# Patient Record
Sex: FEMALE | Race: WHITE | Employment: UNEMPLOYED | URBAN - METROPOLITAN AREA
[De-identification: names, ages, dates, MRNs, and addresses within clinical notes are randomized per-mention and may not be internally consistent; named-entity substitution may affect disease eponyms.]

---
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
## 2020-01-06 ENCOUNTER — OFFICE VISIT (OUTPATIENT)
Dept: FAMILY MEDICINE CLINIC | Age: 20
End: 2020-01-06
Payer: COMMERCIAL

## 2020-01-06 VITALS
TEMPERATURE: 99.4 F | OXYGEN SATURATION: 95 % | WEIGHT: 293 LBS | HEART RATE: 128 BPM | DIASTOLIC BLOOD PRESSURE: 84 MMHG | BODY MASS INDEX: 45.48 KG/M2 | SYSTOLIC BLOOD PRESSURE: 146 MMHG

## 2020-01-06 LAB
INFLUENZA A ANTIBODY: NORMAL
INFLUENZA B ANTIBODY: NORMAL

## 2020-01-06 PROCEDURE — 87804 INFLUENZA ASSAY W/OPTIC: CPT | Performed by: FAMILY MEDICINE

## 2020-01-06 PROCEDURE — 99213 OFFICE O/P EST LOW 20 MIN: CPT | Performed by: FAMILY MEDICINE

## 2020-01-06 RX ORDER — AZITHROMYCIN 250 MG/1
TABLET, FILM COATED ORAL
Qty: 1 PACKET | Refills: 0 | Status: SHIPPED | OUTPATIENT
Start: 2020-01-06 | End: 2020-01-16

## 2020-01-06 ASSESSMENT — ENCOUNTER SYMPTOMS
SHORTNESS OF BREATH: 0
RHINORRHEA: 1
DIARRHEA: 1
SORE THROAT: 1
ABDOMINAL PAIN: 0
VOMITING: 0
COUGH: 1
SINUS PAIN: 1
NAUSEA: 1

## 2020-01-06 NOTE — PROGRESS NOTES
[facility] FAMILY MEDICINE   [address] [place] [address] [address] [facility] [address]  Dept: [phone]  Dept Fax: [phone]    Santo Wise is a 23 y.o. female who presents today for her medical conditions/complaintsas noted below. Santo Wise is c/o of Congestion (head congestion, body aches, sore throat x 1 day )        HPI:     URI    This is a new problem. The current episode started yesterday. The problem has been unchanged. The maximum temperature recorded prior to her arrival was 101 - 101.9 F. Associated symptoms include congestion, coughing (off/on), diarrhea ([id number]), ear pain, headaches, nausea, rhinorrhea, sinus pain and a sore throat. Pertinent negatives include no abdominal pain, rash or vomiting. Associated symptoms comments: myalgia. She has tried acetaminophen (migraine excedrin, dayquil, cough drops) for the symptoms. The treatment provided no relief.    Nothing makes symptoms worse  Works in , mom also sick recently  Patient reports that she has had sinus infections in the past.  Augmentin has not seemed to help but Z-Mark has helped in the past.    Past Medical History:   Diagnosis Date    Left leg DVT ([address]     Pre-diabetes     Thyroid disease       Past Surgical History:   Procedure Laterality Date    TONSILLECTOMY AND ADENOIDECTOMY  06/2016       Family History   Problem Relation Age of Onset    Diabetes Father     Sleep Apnea Father        Social History     Tobacco Use    Smoking status: Never Smoker    Smokeless tobacco: Never Used   Substance Use Topics    Alcohol use: No      Current Outpatient Medications   Medication Sig Dispense Refill    azithromycin (ZITHROMAX) 250 MG tablet Use as directed 1 packet 0    ibuprofen (ADVIL;MOTRIN) 800 MG tablet Take 1 tablet by mouth every 6 hours as needed for Pain 28 tablet 0    cetirizine-psuedoephedrine (ZYRTEC-D ALLERGY & CONGESTION) 5-120 MG per extended release tablet Take 1 Mouth/Throat:      Lips: Pink. Mouth: Mucous membranes are moist.      Pharynx: Posterior oropharyngeal erythema present. No pharyngeal swelling or oropharyngeal exudate. Tonsils: No tonsillar exudate. Swellin on the right. Eyes:      Extraocular Movements: Extraocular movements intact. Conjunctiva/sclera: Conjunctivae normal.   Neck:      Musculoskeletal: Muscular tenderness (With palpation of anterior neck) present. Cardiovascular:      Rate and Rhythm: Normal rate and regular rhythm. Heart sounds: Normal heart sounds. Pulmonary:      Effort: Pulmonary effort is normal.      Breath sounds: Normal breath sounds. Lymphadenopathy:      Cervical: No cervical adenopathy. Skin:     General: Skin is warm and dry. Neurological:      Mental Status: She is alert and oriented to person, place, and time. Mental status is at baseline. Psychiatric:         Mood and Affect: Mood normal.         Behavior: Behavior normal.         Thought Content: Thought content normal.         Judgment: Judgment normal.       BP (!) 146/84 (Site: Left Upper Arm, Position: Sitting, Cuff Size: Large Adult)   Pulse 128   Temp 99.4 °F (37.4 °C) (Oral)   Wt 308 lb (139.7 kg)   SpO2 95%   BMI 45.48 kg/m²     Assessment:       Diagnosis Orders   1. Acute non-recurrent sinusitis, unspecified location     2. Myalgia  POCT Influenza A/B       Plan:   Due to severity of symptoms we will treat as a bacterial infection at this time. Patient reports that Augmentin has not worked for her in the past for similar symptoms but a Z-Mark has worked well  1.) Flu test in office negative. Take zpak as prescribed for sinus infection  2. )If symptoms worsen or do not improve please follow-up with PCP or return to clinic  Orders Placed This Encounter   Procedures    POCT Influenza A/B     Orders Placed This Encounter   Medications    azithromycin (ZITHROMAX) 250 MG tablet     Sig: Use as directed     Dispense:  1 packet

## 2020-01-06 NOTE — LETTER
[facility]   [address] [address]  Phone: [phone]  Fax: [phone]    Abran Leonard MD        January 6, 2020     Patient: Sandi Degroot   YOB: 2000   Date of Visit: 1/6/2020       To Whom it May Concern:    Sandi Degroot was seen in my clinic on 1/6/2020. She is to be excused from work on 1/6/19. She may return to work once fever free for 24 hours. If you have any questions or concerns, please don't hesitate to call.     Sincerely,           Abran Leonard MD

## 2020-01-06 NOTE — PATIENT INSTRUCTIONS
hot, wet towel or a warm gel pack on your face 3 or 4 times a day for 5 to 10 minutes each time. · Try a decongestant nasal spray like oxymetazoline (Afrin). Do not use it for more than 3 days in a row. Using it for more than 3 days can make your congestion worse. When should you call for help? Call your doctor now or seek immediate medical care if:    · You have new or worse swelling or redness in your face or around your eyes.     · You have a new or higher fever.    Watch closely for changes in your health, and be sure to contact your doctor if:    · You have new or worse facial pain.     · The mucus from your nose becomes thicker (like pus) or has new blood in it.     · You are not getting better as expected. Where can you learn more? Go to [email or web address] org and sign in to your [employer] account. Enter P550 in the [facility] box to learn more about \"Sinusitis: Care Instructions. \"     If you do not have an account, please click on the \"Sign Up Now\" link. Current as of: October 21, 2018  Content Version: 12.1  © 3429-6288 [employer]. Care instructions adapted under license by [employer]. If you have questions about a medical condition or this instruction, always ask your healthcare professional. [address] any warranty or liability for your use of this information. 1.) Flu test in office negative. Take zpak as prescribed for sinus infection  2. )If symptoms worsen or do not improve please follow-up with PCP or return to clinic

## 2020-01-17 ENCOUNTER — OFFICE VISIT (OUTPATIENT)
Dept: FAMILY MEDICINE CLINIC | Age: 20
End: 2020-01-17
Payer: COMMERCIAL

## 2020-01-17 VITALS
TEMPERATURE: 98.5 F | OXYGEN SATURATION: 98 % | WEIGHT: 293 LBS | BODY MASS INDEX: 45.63 KG/M2 | HEART RATE: 94 BPM | SYSTOLIC BLOOD PRESSURE: 126 MMHG | DIASTOLIC BLOOD PRESSURE: 78 MMHG

## 2020-01-17 PROCEDURE — 99213 OFFICE O/P EST LOW 20 MIN: CPT | Performed by: FAMILY MEDICINE

## 2020-01-17 RX ORDER — SULFAMETHOXAZOLE AND TRIMETHOPRIM 800; 160 MG/1; MG/1
1 TABLET ORAL 2 TIMES DAILY
Qty: 20 TABLET | Refills: 0 | Status: SHIPPED | OUTPATIENT
Start: 2020-01-17 | End: 2020-01-27

## 2020-01-17 ASSESSMENT — PATIENT HEALTH QUESTIONNAIRE - PHQ9: DEPRESSION UNABLE TO ASSESS: PT REFUSES

## 2020-01-17 ASSESSMENT — ENCOUNTER SYMPTOMS
COLOR CHANGE: 1
GASTROINTESTINAL NEGATIVE: 1
RESPIRATORY NEGATIVE: 1

## 2020-01-18 NOTE — PROGRESS NOTES
[facility]   [address] [facility] [address]  Dept: [phone]  Dept Fax: [phone]    Grace Brandt is a 23 y.o. female who presents today for her medical conditions/complaintsas noted below. Grace Brandt is c/o of Toe Pain (ingrown toenail pain x 3 days L big toe, Toe on R foot starting)        HPI:     Toe Pain    The incident occurred more than 1 week ago (hurting for past few months but getting worse over the past few days). The incident occurred at work. 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 The pain is present in the left toes (left great toe). The pain is at a severity of 7/10. The pain is moderate. The pain has been constant since onset. Pertinent negatives include no inability to bear weight, loss of motion, loss of sensation, muscle weakness, numbness or tingling. Associated symptoms comments: Draining purulent fluid. She reports no foreign bodies present. The symptoms are aggravated by movement and weight bearing. She has tried nothing for the symptoms. Past Medical History:   Diagnosis Date    Left leg DVT ([address]     Pre-diabetes     Thyroid disease       Past Surgical History:   Procedure Laterality Date    TONSILLECTOMY AND ADENOIDECTOMY  06/2016       Family History   Problem Relation Age of Onset    Diabetes Father     Sleep Apnea Father        Social History     Tobacco Use    Smoking status: Never Smoker    Smokeless tobacco: Never Used   Substance Use Topics    Alcohol use: No      Current Outpatient Medications   Medication Sig Dispense Refill    sulfamethoxazole-trimethoprim (BACTRIM DS) 800-160 MG per tablet Take 1 tablet by mouth 2 times daily for 10 days 20 tablet 0    ciclopirox (PENLAC) 8 % solution Apply topically nightly.  1 Bottle 0    cetirizine-psuedoephedrine (ZYRTEC-D ALLERGY & CONGESTION) 5-120 MG per extended release tablet Take 1 tablet by mouth 2 times daily 24 at lateral fold of left great toenail) present. Comments: Right great toenail is thickened and slightly raised but non-painful. Left great toenail is curved and digging into skin on both medial and lateral sides   Neurological:      Mental Status: She is alert and oriented to person, place, and time. Mental status is at baseline. Psychiatric:         Mood and Affect: Mood normal.         Behavior: Behavior normal.         Thought Content: Thought content normal.         Judgment: Judgment normal.       /78 (Site: Left Upper Arm, Position: Sitting, Cuff Size: Large Adult)   Pulse 94   Temp 98.5 °F (36.9 °C)   Wt 309 lb (140.2 kg)   SpO2 98%   BMI 45.63 kg/m²     Assessment:       Diagnosis Orders   1. Infection of toe     2. Onychomycosis         Plan:      1.) Take bactrim as prescribed for infection of toe. Do warm epsom salt soaks a couple times daily. May apply triple antibiotic cream to outside of toenail. 2.) Use pen lac as prescribed for thickened toenail  3.) Follow up with podiatrist as scheduled  4. )If symptoms worsen or do not improve please follow-up with PCP or return to clinic  No orders of the defined types were placed in this encounter. Orders Placed This Encounter   Medications    sulfamethoxazole-trimethoprim (BACTRIM DS) 800-160 MG per tablet     Sig: Take 1 tablet by mouth 2 times daily for 10 days     Dispense:  20 tablet     Refill:  0    ciclopirox (PENLAC) 8 % solution     Sig: Apply topically nightly. Dispense:  1 Bottle     Refill:  0      Patient given educational materials - see patient instructions. Discussed use, benefit, and side effects of prescribed medications. All patient questions answered. Pt voiced understanding. Patient agreed with treatment plan. Follow up as directed.      Electronicallysigned by Casey De Luna MD on 1/17/2020 at 7:58 PM

## 2020-02-12 ENCOUNTER — OFFICE VISIT (OUTPATIENT)
Dept: OBGYN CLINIC | Age: 20
End: 2020-02-12
Payer: COMMERCIAL

## 2020-02-12 ENCOUNTER — HOSPITAL ENCOUNTER (OUTPATIENT)
Age: 20
Setting detail: SPECIMEN
Discharge: HOME | End: 2020-02-12
Payer: COMMERCIAL

## 2020-02-12 VITALS
RESPIRATION RATE: 16 BRPM | WEIGHT: 293 LBS | SYSTOLIC BLOOD PRESSURE: 145 MMHG | DIASTOLIC BLOOD PRESSURE: 99 MMHG | BODY MASS INDEX: 45.93 KG/M2 | HEART RATE: 86 BPM

## 2020-02-12 LAB
CONTROL: YES
DIRECT EXAM: NORMAL
Lab: NORMAL
PREGNANCY TEST URINE, POC: NEGATIVE
SPECIMEN DESCRIPTION: NORMAL

## 2020-02-12 PROCEDURE — 99213 OFFICE O/P EST LOW 20 MIN: CPT | Performed by: CLINICAL NURSE SPECIALIST

## 2020-02-12 PROCEDURE — 81025 URINE PREGNANCY TEST: CPT | Performed by: CLINICAL NURSE SPECIALIST

## 2020-02-12 ASSESSMENT — ENCOUNTER SYMPTOMS
RESPIRATORY NEGATIVE: 1
GASTROINTESTINAL NEGATIVE: 1
ALLERGIC/IMMUNOLOGIC NEGATIVE: 1
EYES NEGATIVE: 1

## 2020-02-12 NOTE — PROGRESS NOTES
tranSubjective:      Patient ID:  Rach Smith is a 23 y.o. female who presents for   Chief Complaint   Patient presents with    Nausea     patient is here today because she has been having low abdominal sharp pains and nausea for about 3 months. When she has the pain, it is a 7/10.  Abdominal Cramping       HPI     Patient is a 24 yo female who presents for lower pelvic pain described as cramping and sharp and nausea for the past 1 month. The pain is described as sharp at times 7/10 pain scale. Patient reports that she has noticed that when she is laying on her stomach she feels like she is laying on a ball. Review of Systems   Constitutional: Negative for chills and fever. HENT: Negative. Eyes: Negative. Respiratory: Negative. Cardiovascular: Negative. Gastrointestinal: Negative. Endocrine: Negative. Genitourinary: Positive for pelvic pain (which has been ongoing for approx. 1 month described as cramping and sharp). Negative for dysuria and vaginal discharge. Musculoskeletal: Negative. Skin: Negative. Allergic/Immunologic: Negative. Neurological: Negative. Hematological: Negative. Psychiatric/Behavioral: Negative. BP (!) 145/99   Pulse 86   Resp 16   Wt 311 lb (141.1 kg)   LMP 11/12/2019 (Approximate)   BMI 45.93 kg/m²    Patient's last menstrual period was 11/12/2019 (approximate). Objective:   Physical Exam  Vitals signs reviewed. Constitutional:       Appearance: She is well-developed. HENT:      Head: Normocephalic and atraumatic. Eyes:      Conjunctiva/sclera: Conjunctivae normal.   Neck:      Musculoskeletal: Normal range of motion and neck supple. Cardiovascular:      Rate and Rhythm: Normal rate and regular rhythm. Pulmonary:      Effort: Pulmonary effort is normal.      Breath sounds: Normal breath sounds. Abdominal:      General: Bowel sounds are normal.   Genitourinary:     Vagina: No vaginal discharge.    Musculoskeletal: Normal

## 2020-03-03 ENCOUNTER — OFFICE VISIT (OUTPATIENT)
Dept: OBGYN CLINIC | Age: 20
End: 2020-03-03
Payer: COMMERCIAL

## 2020-03-03 ENCOUNTER — HOSPITAL ENCOUNTER (OUTPATIENT)
Age: 20
Setting detail: SPECIMEN
Discharge: HOME | End: 2020-03-03
Payer: COMMERCIAL

## 2020-03-03 VITALS
WEIGHT: 293 LBS | DIASTOLIC BLOOD PRESSURE: 88 MMHG | BODY MASS INDEX: 44.41 KG/M2 | SYSTOLIC BLOOD PRESSURE: 134 MMHG | HEART RATE: 84 BPM | HEIGHT: 68 IN

## 2020-03-03 PROCEDURE — 99213 OFFICE O/P EST LOW 20 MIN: CPT | Performed by: SPECIALIST

## 2020-03-03 ASSESSMENT — ENCOUNTER SYMPTOMS
NAUSEA: 0
VOMITING: 0
DIARRHEA: 0
ABDOMINAL PAIN: 0
COUGH: 0
CONSTIPATION: 0
EYE PAIN: 0
ABDOMINAL DISTENTION: 0
APNEA: 0

## 2020-03-03 ASSESSMENT — PATIENT HEALTH QUESTIONNAIRE - PHQ9
1. LITTLE INTEREST OR PLEASURE IN DOING THINGS: 1
SUM OF ALL RESPONSES TO PHQ QUESTIONS 1-9: 2
SUM OF ALL RESPONSES TO PHQ QUESTIONS 1-9: 2
2. FEELING DOWN, DEPRESSED OR HOPELESS: 1
SUM OF ALL RESPONSES TO PHQ9 QUESTIONS 1 & 2: 2

## 2020-03-03 NOTE — PROGRESS NOTES
Subjective:      Patient ID: Francisco Zendejas is a 23 y.o. female. Chief Complaint   Patient presents with    Follow-up     Pt here for US results for pelvic pain  Pt is still having pain but less nausea     /88 (Site: Right Lower Arm, Position: Sitting, Cuff Size: Medium Adult)   Pulse 84   Ht 5' 8\" (1.727 m)   Wt 307 lb (139.3 kg)   LMP 08/22/2019   Breastfeeding No   BMI 46.68 kg/m²   Patient's last menstrual period was 08/22/2019. No obstetric history on file. Past Medical History:   Diagnosis Date    Left leg DVT (HCC)     Pre-diabetes     Thyroid disease      Current Outpatient Medications Ordered in Epic   Medication Sig Dispense Refill    metFORMIN (GLUCOPHAGE XR) 500 MG extended release tablet Take 1 tablet by mouth 2 times daily 30 tablet 3    thyroid (ARMOUR) 60 MG tablet Take 60 mg by mouth daily      Cholecalciferol (VITAMIN D3) 00565 units CAPS Take 5,000 Units by mouth Twice a Week       No current Epic-ordered facility-administered medications on file. Problem List Items Addressed This Visit     None      Visit Diagnoses     Amenorrhea    -  Primary    Relevant Orders    Follicle Stimulating Hormone    Luteinizing Hormone    TSH    Pelvic pain in female        Relevant Orders    Follicle Stimulating Hormone    Luteinizing Hormone    TSH        No Known Allergies  Orders Placed This Encounter   Procedures    Follicle Stimulating Hormone     Standing Status:   Future     Standing Expiration Date:   5/3/2020    Luteinizing Hormone     Standing Status:   Future     Standing Expiration Date:   5/3/2020    TSH     Standing Status:   Future     Standing Expiration Date:   3/3/2021        Patient is here today to discuss the result of her ultrasound done for pelvic pain. She has not had a period since August 2019. The pain was causing nausea, but the nausea has improved. She denies fever.        Review of Systems   Constitutional: Negative for activity change, appetite change Jaye Rand am scribing for, and in the presence of Dr. Matt Garcia. Electronically signed by: Jaye Rand 3/3/20 3:50 PM       I agree to the above documentation placed by my scribe Jaye Rand. I reviewed the scribe's note and agree with the documented findings and plan of care. Any areas of disagreement are noted on the chart. I have personally evaluated this patient. Additional findings are as noted. I agree with the chief complaint, past medical history, past surgical history, allergies, medications, social and family history as documented unless otherwise noted below.      Electronically signed by Matt Garcia MD on 3/4/2020 at 3:44 AM

## 2020-03-04 PROBLEM — R10.2 PELVIC PAIN IN FEMALE: Status: ACTIVE | Noted: 2020-03-04

## 2020-03-04 PROBLEM — N91.2 AMENORRHEA: Status: ACTIVE | Noted: 2020-03-04

## 2020-03-04 LAB
FOLLICLE STIMULATING HORMONE: 6.1 U/L (ref 1.7–21.5)
LH: 9.7 U/L (ref 1–95.6)
TSH SERPL DL<=0.05 MIU/L-ACNC: 2.56 MIU/L (ref 0.3–5)

## 2020-04-02 ENCOUNTER — TELEMEDICINE (OUTPATIENT)
Dept: OBGYN CLINIC | Age: 20
End: 2020-04-02
Payer: COMMERCIAL

## 2020-04-02 PROCEDURE — 99441 PR PHYS/QHP TELEPHONE EVALUATION 5-10 MIN: CPT | Performed by: SPECIALIST

## 2020-04-02 NOTE — PROGRESS NOTES
mentioned above. A caregiver was present when appropriate. Due to this being a TeleHealth encounter (During SXPAR-97 public health emergency), evaluation of the following organ systems was limited: Vitals/Constitutional/EENT/Resp/CV/GI//MS/Neuro/Skin/Heme-Lymph-Imm. Pursuant to the emergency declaration under the [facility], [address] [place] [address] and the [employer] Act, this Virtual Visit was conducted with patient's (and/or legal guardian's) consent, to reduce the patient's risk of exposure to COVID-19 and provide necessary medical care. The patient (and/or legal guardian) has also been advised to contact this office for worsening conditions or problems, and seek emergency medical treatment and/or call 911 if deemed necessary. Services were provided through a video synchronous discussion virtually to substitute for in-person clinic visit. Patient was located in her home and provider was located at his office in [place], [place]. Electronically signed by Tonya Dillon on 4/2/20 at 2:16 PM EDT     An electronic signature was used to authenticate this note. The Virtual Visit time of 8 minutes. More than 50% of this visit was counseling and education regarding The primary encounter diagnosis was PCOS (polycystic ovarian syndrome). A diagnosis of Amenorrhea was also pertinent to this visit. and Pelvic Pain (patient is doing a virtual visit today to review results that she had done for a PCO work up. Patient states she hasn't been having pelvic pain and has been having some nausea.)   as well as  counseling on preventative health maintenance follow-up. I agree to the above documentation placed by my scribe Tonya Dillon. I reviewed the scribe's note and agree with the documented findings and plan of care. Any areas of disagreement are noted on the chart. I have personally evaluated this patient.  Additional

## 2020-04-03 PROBLEM — E28.2 PCOS (POLYCYSTIC OVARIAN SYNDROME): Status: ACTIVE | Noted: 2020-04-03

## 2020-06-30 ENCOUNTER — OFFICE VISIT (OUTPATIENT)
Dept: OBGYN CLINIC | Age: 20
End: 2020-06-30
Payer: COMMERCIAL

## 2020-06-30 VITALS
WEIGHT: 293 LBS | TEMPERATURE: 97.3 F | SYSTOLIC BLOOD PRESSURE: 126 MMHG | BODY MASS INDEX: 44.41 KG/M2 | DIASTOLIC BLOOD PRESSURE: 80 MMHG | HEIGHT: 68 IN

## 2020-06-30 LAB
CONTROL: NORMAL
PREGNANCY TEST URINE, POC: NEGATIVE

## 2020-06-30 PROCEDURE — 99213 OFFICE O/P EST LOW 20 MIN: CPT | Performed by: CLINICAL NURSE SPECIALIST

## 2020-06-30 PROCEDURE — 81025 URINE PREGNANCY TEST: CPT | Performed by: CLINICAL NURSE SPECIALIST

## 2020-06-30 RX ORDER — METFORMIN HYDROCHLORIDE 750 MG/1
750 TABLET, EXTENDED RELEASE ORAL
Qty: 30 TABLET | Refills: 3 | Status: SHIPPED | OUTPATIENT
Start: 2020-06-30 | End: 2021-03-11 | Stop reason: SDUPTHER

## 2020-06-30 ASSESSMENT — ENCOUNTER SYMPTOMS
EYES NEGATIVE: 1
GASTROINTESTINAL NEGATIVE: 1
ALLERGIC/IMMUNOLOGIC NEGATIVE: 1
RESPIRATORY NEGATIVE: 1

## 2020-07-20 ENCOUNTER — HOSPITAL ENCOUNTER (OUTPATIENT)
Age: 20
Setting detail: SPECIMEN
Discharge: HOME | End: 2020-07-20
Payer: COMMERCIAL

## 2020-07-20 ENCOUNTER — OFFICE VISIT (OUTPATIENT)
Dept: PRIMARY CARE CLINIC | Age: 20
End: 2020-07-20
Payer: COMMERCIAL

## 2020-07-20 VITALS
HEIGHT: 68 IN | HEART RATE: 92 BPM | WEIGHT: 293 LBS | OXYGEN SATURATION: 98 % | BODY MASS INDEX: 44.41 KG/M2 | TEMPERATURE: 98.6 F

## 2020-07-20 PROCEDURE — 99213 OFFICE O/P EST LOW 20 MIN: CPT | Performed by: NURSE PRACTITIONER

## 2020-07-20 ASSESSMENT — ENCOUNTER SYMPTOMS
ALLERGIC/IMMUNOLOGIC NEGATIVE: 1
SHORTNESS OF BREATH: 0
DIARRHEA: 1
EYES NEGATIVE: 1
ABDOMINAL PAIN: 0
VOMITING: 1
EYE DISCHARGE: 0
COUGH: 0
CHEST TIGHTNESS: 0
NAUSEA: 1
SORE THROAT: 0
EYE ITCHING: 0

## 2020-07-20 ASSESSMENT — PATIENT HEALTH QUESTIONNAIRE - PHQ9
2. FEELING DOWN, DEPRESSED OR HOPELESS: 0
SUM OF ALL RESPONSES TO PHQ QUESTIONS 1-9: 0
SUM OF ALL RESPONSES TO PHQ9 QUESTIONS 1 & 2: 0
SUM OF ALL RESPONSES TO PHQ QUESTIONS 1-9: 0
1. LITTLE INTEREST OR PLEASURE IN DOING THINGS: 0

## 2020-07-20 NOTE — PATIENT INSTRUCTIONS
if you are sick or have been exposed to the virus. Don't go to school, work, or public areas. And don't use public transportation, ride-shares, or taxis unless you have no choice. · If you are sick:  ? Leave your home only if you need to get medical care. But call the doctor's office first so they know you're coming. And wear a face cover. ? Wear the face cover whenever you're around other people. It can help stop the spread of the virus when you cough or sneeze. ? Clean and disinfect your home every day. Use household  and disinfectant wipes or sprays. Take special care to clean things that you grab with your hands. These include doorknobs, remote controls, phones, and handles on your refrigerator and microwave. And don't forget countertops, tabletops, bathrooms, and computer keyboards. When to call for help  [id number] anytime you think you may need emergency care. For example, call if:  · You have severe trouble breathing. (You can't talk at all.)  · You have constant chest pain or pressure. · You are severely dizzy or lightheaded. · You are confused or can't think clearly. · Your face and lips have a blue color. · You pass out (lose consciousness) or are very hard to wake up. Call your doctor now if you develop symptoms such as:  · Shortness of breath. · Fever. · Cough. If you need to get care, call ahead to the doctor's office for instructions before you go. Make sure you wear a face cover to prevent exposing other people to the virus. Where can you get the latest information? The following health organizations are tracking and studying this virus. Their websites contain the most up-to-date information. Katt Ledbetter also learn what to do if you think you may have been exposed to the virus. · U.S. Centers for Disease Control and Prevention (CDC): The CDC provides updated news about the disease and travel advice. The website also tells you how to prevent the spread of infection.  www.cdc.gov  · World Health Organization [facility]): WHO offers information about the virus outbreaks. WHO also has travel advice. www.who.int  Current as of: May 8, 2020               Content Version: 12.5  © 2006-2020 [employer]. Care instructions adapted under license by [facility] ([facility]). If you have questions about a medical condition or this instruction, always ask your healthcare professional. [place] [address] any warranty or liability for your use of this information.

## 2020-07-20 NOTE — LETTER
[address] [address]  Phone: [phone]  Fax: [phone]    HILDA Saldana CNP        July 20, 2020     Patient: Zabrina Page   YOB: 2000   Date of Visit: 7/20/2020       To Whom It May Concern:    Baltazar Elizabeth was evaluated and tested for Covid 19 today   It is my medical opinion that Zabrina Page should remain out of work until [employer] is negative . If you have any questions or concerns, please don't hesitate to call.     Sincerely,        HILDA Saldana CNP

## 2020-07-20 NOTE — PROGRESS NOTES
[address]  [address] [facility] [address] [address]  Dept: [phone]  Dept Fax: [phone]    Marika Mills is a 21 y.o. female who presents today forher medical conditions/complaints as noted below. Marika Mills is c/o of   Chief Complaint   Patient presents with    Covid Testing     nausea, vomiting, diarrhea, sinus, cough     HPI:     Diarrhea    This is a new problem. The current episode started in the past 7 days. The problem occurs 2 to 4 times per day. The stool consistency is described as watery. Associated symptoms include a URI and vomiting. Pertinent negatives include no abdominal pain, chills, coughing, fever or headaches. Associated symptoms comments: Nausea and vomiting . Past Medical History:   Diagnosis Date    Left leg DVT ([address]     Pre-diabetes     Thyroid disease       Past Surgical History:   Procedure Laterality Date    TONSILLECTOMY AND ADENOIDECTOMY  06/2016       Family History   Problem Relation Age of Onset    Diabetes Father     Sleep Apnea Father        Social History     Tobacco Use    Smoking status: Never Smoker    Smokeless tobacco: Never Used   Substance Use Topics    Alcohol use: No      Current Outpatient Medications   Medication Sig Dispense Refill    metFORMIN (GLUCOPHAGE XR) 750 MG extended release tablet Take 1 tablet by mouth daily (with breakfast) 30 tablet 3    norethindrone-ethinyl estradiol (OVCON-35, 28,) 0.4-35 MG-MCG per tablet Take 1 tablet by mouth daily 1 packet 3    thyroid (ARMOUR) 60 MG tablet Take 60 mg by mouth daily      Cholecalciferol (VITAMIN D3) 22138 units CAPS Take 5,000 Units by mouth Twice a Week       No current facility-administered medications for this visit. No Known Allergies        Subjective:      Review of Systems   Constitutional: Negative for appetite change, chills, fatigue and fever. HENT: Positive for congestion. Negative for postnasal drip and sore throat. Eyes: Negative. Negative for discharge and itching. Respiratory: Negative for cough, chest tightness and shortness of breath. Cardiovascular: Negative for chest pain, palpitations and leg swelling. Gastrointestinal: Positive for diarrhea, nausea and vomiting. Negative for abdominal pain. Endocrine: Negative. Genitourinary: Negative for dysuria, frequency and urgency. Musculoskeletal: Negative for neck pain and neck stiffness. Skin: Negative for rash. Allergic/Immunologic: Negative. Neurological: Negative for dizziness, weakness, light-headedness and headaches. Hematological: Negative for adenopathy. Psychiatric/Behavioral: Negative for confusion. Objective:      Physical Exam  Vitals signs reviewed. Constitutional:       Appearance: She is well-developed. HENT:      Head: Normocephalic. Right Ear: External ear normal.      Left Ear: External ear normal.      Nose: Nose normal.   Eyes:      Conjunctiva/sclera: Conjunctivae normal.      Pupils: Pupils are equal, round, and reactive to light. Neck:      Musculoskeletal: Normal range of motion and neck supple. Cardiovascular:      Rate and Rhythm: Normal rate and regular rhythm. Heart sounds: Normal heart sounds. No murmur. No friction rub. No gallop. Pulmonary:      Effort: Pulmonary effort is normal. No respiratory distress. Breath sounds: Normal breath sounds. Abdominal:      General: Bowel sounds are normal.      Palpations: Abdomen is soft. Musculoskeletal: Normal range of motion. Lymphadenopathy:      Cervical: No cervical adenopathy. Skin:     General: Skin is warm and dry. Findings: No rash. Neurological:      Mental Status: She is alert and oriented to person, place, and time. Cranial Nerves: No cranial nerve deficit. Coordination: Coordination normal.      Deep Tendon Reflexes: Reflexes are normal and symmetric. Psychiatric:         Thought Content:  Thought content normal. Pulse 92   Temp 98.6 °F (37 °C) (Tympanic)   Ht 5' 8\" (1.727 m)   Wt 300 lb (136.1 kg)   SpO2 98%   BMI 45.61 kg/m²     Assessment:       Diagnosis Orders   1. Suspected COVID-19 virus infection  COVID-19 Ambulatory   2. Nausea and vomiting, intractability of vomiting not specified, unspecified vomiting type  COVID-19 Ambulatory   3. Diarrhea, unspecified type  COVID-19 Ambulatory         Plan:     1.) Covid swab obtained and sent to lab- will call with results   2.) Quarantine while waiting for Covid results   3.) Symptom management encouraged  4.) Follow-up with PCP PRN     Advance Care Planning  People with COVID-19 may have no symptoms, mild symptoms, such as fever, cough, and shortness of breath or they may have more severe illness, developing severe and fatal pneumonia. As a result, Advance Care Planning with attention to naming a health care decision maker (someone you trust to make healthcare decisions for you if you could not speak for yourself) and sharing other health care preferences is important BEFORE a possible health crisis. Please contact your Primary Care Provider to discuss Advance Care Planning. Preventing the Spread of Coronavirus Disease 2019 in Homes and Residential Communities  For the most recent information go to [email or web address]    Prevention steps for People with confirmed or suspected COVID-19 (including persons under investigation) who do not need to be hospitalized  and   People with confirmed COVID-19 who were hospitalized and determined to be medically stable to go home    Your healthcare provider and public health staff will evaluate whether you can be cared for at home. If it is determined that you do not need to be hospitalized and can be isolated at home, you will be monitored by staff from your local or state health department.  You should follow the prevention steps below until a healthcare provider or local or state health department says you can return to your normal activities. Stay home except to get medical care  People who are mildly ill with COVID-19 are able to isolate at home during their illness. You should restrict activities outside your home, except for getting medical care. Do not go to work, school, or public areas. Avoid using public transportation, ride-sharing, or taxis. Separate yourself from other people and animals in your home  People: As much as possible, you should stay in a specific room and away from other people in your home. Also, you should use a separate bathroom, if available. Animals: You should restrict contact with pets and other animals while you are sick with COVID-19, just like you would around other people. Although there have not been reports of pets or other animals becoming sick with COVID-19, it is still recommended that people sick with COVID-19 limit contact with animals until more information is known about the virus. When possible, have another member of your household care for your animals while you are sick. If you are sick with COVID-19, avoid contact with your pet, including petting, snuggling, being kissed or licked, and sharing food. If you must care for your pet or be around animals while you are sick, wash your hands before and after you interact with pets and wear a facemask. Call ahead before visiting your doctor  If you have a medical appointment, call the healthcare provider and tell them that you have or may have COVID-19. This will help the healthcare providers office take steps to keep other people from getting infected or exposed. Wear a facemask  You should wear a facemask when you are around other people (e.g., sharing a room or vehicle) or pets and before you enter a healthcare providers office.  If you are not able to wear a facemask (for example, because it causes trouble breathing), then people who live with you should not stay in the same room with you, or they should wear a facemask if they enter your room. Cover your coughs and sneezes  Cover your mouth and nose with a tissue when you cough or sneeze. Throw used tissues in a lined trash can. Immediately wash your hands with soap and water for at least 20 seconds or, if soap and water are not available, clean your hands with an alcohol-based hand  that contains at least 60% alcohol. Clean your hands often  Wash your hands often with soap and water for at least 20 seconds, especially after blowing your nose, coughing, or sneezing; going to the bathroom; and before eating or preparing food. If soap and water are not readily available, use an alcohol-based hand  with at least 60% alcohol, covering all surfaces of your hands and rubbing them together until they feel dry. Soap and water are the best option if hands are visibly dirty. Avoid touching your eyes, nose, and mouth with unwashed hands. Avoid sharing personal household items  You should not share dishes, drinking glasses, cups, eating utensils, towels, or bedding with other people or pets in your home. After using these items, they should be washed thoroughly with soap and water. Clean all high-touch surfaces everyday  High touch surfaces include counters, tabletops, doorknobs, bathroom fixtures, toilets, phones, keyboards, tablets, and bedside tables. Also, clean any surfaces that may have blood, stool, or body fluids on them. Use a household cleaning spray or wipe, according to the label instructions. Labels contain instructions for safe and effective use of the cleaning product including precautions you should take when applying the product, such as wearing gloves and making sure you have good ventilation during use of the product. Monitor your symptoms  Seek prompt medical attention if your illness is worsening (e.g., difficulty breathing).  Before seeking care, call your healthcare provider and tell them that you

## 2020-07-26 ENCOUNTER — TELEPHONE (OUTPATIENT)
Dept: PRIMARY CARE CLINIC | Age: 20
End: 2020-07-26

## 2020-07-26 LAB — SARS-COV-2, NAA: DETECTED

## 2020-07-26 NOTE — TELEPHONE ENCOUNTER
Patient notified of Positive results. Patient sates she is feeling fine and has not had any symptoms for 3-4 days. Patient has no questions at this time.

## 2020-07-26 NOTE — TELEPHONE ENCOUNTER
 The COVID-19 test result was POSITIVE; meaning this patient has the virus. The instructions given in the AVS at the time of this patient's appointment, instructing the patient about home isolation and avoidance of others should be strictly followed.  Contacts who are NOT healthcare providers or first responders and are asymptomatic (no fever, cough, shortness of breath, or difficulty breathing) should self-quarantine for 14 days from the last date of exposure to confirmed or suspected COVID-19 as a minimum.  Treatment of coronavirus does not require an antibiotic   You are contagious.  You should follow the prevention steps, given to you in the 'after visit summary' (AVS) at the time of your visit,until a healthcare provider or local or [facility] health department says you can return to your normal activities.  At the time of discharge from the flu clinic you were given instructions (AVS) on what to do if you are positive for corona virus; please continue to follow those instructions. This patient should not go back to work till cleared medically from their own medical provider or the local health department.  This patient should have been signed up at the time of their clinic visit with the [facility] 'Get Well Loop' to have daily contact about symptoms. They should also expect contact from the health department for further instructions.  Wash hands often with soap and water for at least 20 seconds or alternatively use hand  with at least 60% alcohol content   Cover coughs and sneezes   Wear a mask when around others.  People who are around you should also be wearing a mask and washing hands constantly- this patient should be in home isolation.  Clean all high-touch surfaces frequently, such as doorknobs and cellphones.  Continually monitor symptoms. Contact a medical provider if symptoms are worsening, such as difficulty breathing.     At this time the Health department will be in touch with you.  Please follow up with your physician for a virtual visit in the next 5-7 days.  At the time you left the flu clinic, you were registered with [employer]'s automated electronic follow-up system. [facility] should be contacting the patient about further instructions and the health department should also. For additional information, please visit the Centers for Disease Control and Prevention ([email or web address].

## 2020-07-27 ENCOUNTER — TELEPHONE (OUTPATIENT)
Dept: ADMINISTRATIVE | Age: 20
End: 2020-07-27

## 2020-07-31 ENCOUNTER — HOSPITAL ENCOUNTER (EMERGENCY)
Age: 20
Discharge: HOME | End: 2020-07-31
Attending: EMERGENCY MEDICINE
Payer: COMMERCIAL

## 2020-07-31 VITALS
WEIGHT: 293 LBS | OXYGEN SATURATION: 97 % | HEART RATE: 85 BPM | RESPIRATION RATE: 14 BRPM | TEMPERATURE: 97.9 F | BODY MASS INDEX: 44.41 KG/M2 | HEIGHT: 68 IN | DIASTOLIC BLOOD PRESSURE: 63 MMHG | SYSTOLIC BLOOD PRESSURE: 129 MMHG

## 2020-07-31 LAB
-: ABNORMAL
ABSOLUTE EOS #: 0.2 K/UL (ref 0–0.4)
ABSOLUTE IMMATURE GRANULOCYTE: ABNORMAL K/UL (ref 0–0.3)
ABSOLUTE LYMPH #: 2 K/UL (ref 1.2–5.2)
ABSOLUTE MONO #: 0.7 K/UL (ref 0.1–1.3)
ALBUMIN SERPL-MCNC: 4.3 G/DL (ref 3.5–5.2)
ALBUMIN/GLOBULIN RATIO: ABNORMAL (ref 1–2.5)
ALP BLD-CCNC: 75 U/L (ref 35–104)
ALT SERPL-CCNC: 41 U/L (ref 5–33)
AMORPHOUS: ABNORMAL
ANION GAP SERPL CALCULATED.3IONS-SCNC: 12 MMOL/L (ref 9–17)
AST SERPL-CCNC: 21 U/L
BACTERIA: ABNORMAL
BASOPHILS # BLD: 1 % (ref 0–2)
BASOPHILS ABSOLUTE: 0.1 K/UL (ref 0–0.2)
BILIRUB SERPL-MCNC: 0.76 MG/DL (ref 0.3–1.2)
BILIRUBIN URINE: NEGATIVE
BUN BLDV-MCNC: 6 MG/DL (ref 6–20)
BUN/CREAT BLD: ABNORMAL (ref 9–20)
CALCIUM SERPL-MCNC: 8.9 MG/DL (ref 8.6–10.4)
CASTS UA: ABNORMAL /LPF
CHLORIDE BLD-SCNC: 104 MMOL/L (ref 98–107)
CO2: 23 MMOL/L (ref 20–31)
COLOR: YELLOW
COMMENT UA: ABNORMAL
CREAT SERPL-MCNC: 0.57 MG/DL (ref 0.5–0.9)
CRYSTALS, UA: ABNORMAL /HPF
DIFFERENTIAL TYPE: ABNORMAL
EOSINOPHILS RELATIVE PERCENT: 2 % (ref 0–4)
EPITHELIAL CELLS UA: ABNORMAL /HPF
GFR AFRICAN AMERICAN: >60 ML/MIN
GFR NON-AFRICAN AMERICAN: >60 ML/MIN
GFR SERPL CREATININE-BSD FRML MDRD: ABNORMAL ML/MIN/{1.73_M2}
GFR SERPL CREATININE-BSD FRML MDRD: ABNORMAL ML/MIN/{1.73_M2}
GLUCOSE BLD-MCNC: 133 MG/DL (ref 70–99)
GLUCOSE URINE: NEGATIVE
HCG QUALITATIVE: NEGATIVE
HCT VFR BLD CALC: 40.6 % (ref 36–46)
HEMOGLOBIN: 14.5 G/DL (ref 12–16)
IMMATURE GRANULOCYTES: ABNORMAL %
KETONES, URINE: ABNORMAL
LEUKOCYTE ESTERASE, URINE: ABNORMAL
LIPASE: 17 U/L (ref 13–60)
LYMPHOCYTES # BLD: 17 % (ref 25–45)
MCH RBC QN AUTO: 31.5 PG (ref 26–34)
MCHC RBC AUTO-ENTMCNC: 35.7 G/DL (ref 31–37)
MCV RBC AUTO: 88.4 FL (ref 80–100)
MONOCYTES # BLD: 6 % (ref 2–8)
MUCUS: ABNORMAL
NITRITE, URINE: POSITIVE
NRBC AUTOMATED: ABNORMAL PER 100 WBC
OTHER OBSERVATIONS UA: ABNORMAL
PDW BLD-RTO: 12.1 % (ref 11.5–14.9)
PH UA: 7.5 (ref 5–8)
PLATELET # BLD: 369 K/UL (ref 150–450)
PLATELET ESTIMATE: ABNORMAL
PMV BLD AUTO: 8.5 FL (ref 6–12)
POTASSIUM SERPL-SCNC: 4 MMOL/L (ref 3.7–5.3)
PROTEIN UA: NEGATIVE
RBC # BLD: 4.59 M/UL (ref 4–5.2)
RBC # BLD: ABNORMAL 10*6/UL
RBC UA: ABNORMAL /HPF
RENAL EPITHELIAL, UA: ABNORMAL /HPF
SEG NEUTROPHILS: 74 % (ref 34–64)
SEGMENTED NEUTROPHILS ABSOLUTE COUNT: 8.5 K/UL (ref 1.3–9.1)
SODIUM BLD-SCNC: 139 MMOL/L (ref 135–144)
SPECIFIC GRAVITY UA: 1.01 (ref 1–1.03)
TOTAL PROTEIN: 7.6 G/DL (ref 6.4–8.3)
TRICHOMONAS: ABNORMAL
TURBIDITY: CLEAR
URINE HGB: NEGATIVE
UROBILINOGEN, URINE: NORMAL
WBC # BLD: 11.5 K/UL (ref 4.5–13.5)
WBC # BLD: ABNORMAL 10*3/UL
WBC UA: ABNORMAL /HPF
YEAST: ABNORMAL

## 2020-07-31 PROCEDURE — 81001 URINALYSIS AUTO W/SCOPE: CPT

## 2020-07-31 PROCEDURE — 96374 THER/PROPH/DIAG INJ IV PUSH: CPT

## 2020-07-31 PROCEDURE — 80053 COMPREHEN METABOLIC PANEL: CPT

## 2020-07-31 PROCEDURE — 84703 CHORIONIC GONADOTROPIN ASSAY: CPT

## 2020-07-31 PROCEDURE — 96375 TX/PRO/DX INJ NEW DRUG ADDON: CPT

## 2020-07-31 PROCEDURE — C9113 INJ PANTOPRAZOLE SODIUM, VIA: HCPCS | Performed by: EMERGENCY MEDICINE

## 2020-07-31 PROCEDURE — 6370000000 HC RX 637 (ALT 250 FOR IP): Performed by: EMERGENCY MEDICINE

## 2020-07-31 PROCEDURE — 83690 ASSAY OF LIPASE: CPT

## 2020-07-31 PROCEDURE — 85025 COMPLETE CBC W/AUTO DIFF WBC: CPT

## 2020-07-31 PROCEDURE — 87186 SC STD MICRODIL/AGAR DIL: CPT

## 2020-07-31 PROCEDURE — 99283 EMERGENCY DEPT VISIT LOW MDM: CPT

## 2020-07-31 PROCEDURE — 6360000002 HC RX W HCPCS: Performed by: EMERGENCY MEDICINE

## 2020-07-31 PROCEDURE — 36415 COLL VENOUS BLD VENIPUNCTURE: CPT

## 2020-07-31 PROCEDURE — 2580000003 HC RX 258: Performed by: EMERGENCY MEDICINE

## 2020-07-31 PROCEDURE — 87088 URINE BACTERIA CULTURE: CPT

## 2020-07-31 PROCEDURE — 87086 URINE CULTURE/COLONY COUNT: CPT

## 2020-07-31 RX ORDER — ONDANSETRON 2 MG/ML
4 INJECTION INTRAMUSCULAR; INTRAVENOUS ONCE
Status: COMPLETED | OUTPATIENT
Start: 2020-07-31 | End: 2020-07-31

## 2020-07-31 RX ORDER — DICYCLOMINE HYDROCHLORIDE 10 MG/1
10 CAPSULE ORAL ONCE
Status: COMPLETED | OUTPATIENT
Start: 2020-07-31 | End: 2020-07-31

## 2020-07-31 RX ORDER — PANTOPRAZOLE SODIUM 20 MG/1
40 TABLET, DELAYED RELEASE ORAL DAILY
Qty: 30 TABLET | Refills: 0 | Status: SHIPPED | OUTPATIENT
Start: 2020-07-31 | End: 2021-02-23 | Stop reason: ALTCHOICE

## 2020-07-31 RX ORDER — SODIUM CHLORIDE 9 MG/ML
10 INJECTION INTRAVENOUS ONCE
Status: COMPLETED | OUTPATIENT
Start: 2020-07-31 | End: 2020-07-31

## 2020-07-31 RX ORDER — DICYCLOMINE HYDROCHLORIDE 10 MG/1
10 CAPSULE ORAL EVERY 6 HOURS PRN
Qty: 20 CAPSULE | Refills: 0 | Status: SHIPPED | OUTPATIENT
Start: 2020-07-31 | End: 2021-11-11 | Stop reason: ALTCHOICE

## 2020-07-31 RX ORDER — ONDANSETRON 4 MG/1
4 TABLET, ORALLY DISINTEGRATING ORAL EVERY 8 HOURS PRN
Qty: 10 TABLET | Refills: 0 | Status: SHIPPED | OUTPATIENT
Start: 2020-07-31 | End: 2020-09-03 | Stop reason: SDUPTHER

## 2020-07-31 RX ORDER — 0.9 % SODIUM CHLORIDE 0.9 %
1000 INTRAVENOUS SOLUTION INTRAVENOUS ONCE
Status: COMPLETED | OUTPATIENT
Start: 2020-07-31 | End: 2020-07-31

## 2020-07-31 RX ORDER — MAGNESIUM HYDROXIDE/ALUMINUM HYDROXICE/SIMETHICONE 120; 1200; 1200 MG/30ML; MG/30ML; MG/30ML
30 SUSPENSION ORAL
Status: COMPLETED | OUTPATIENT
Start: 2020-07-31 | End: 2020-07-31

## 2020-07-31 RX ORDER — CEPHALEXIN 500 MG/1
500 CAPSULE ORAL 2 TIMES DAILY
Qty: 14 CAPSULE | Refills: 0 | Status: SHIPPED | OUTPATIENT
Start: 2020-07-31 | End: 2020-08-07

## 2020-07-31 RX ORDER — PANTOPRAZOLE SODIUM 40 MG/10ML
40 INJECTION, POWDER, LYOPHILIZED, FOR SOLUTION INTRAVENOUS ONCE
Status: COMPLETED | OUTPATIENT
Start: 2020-07-31 | End: 2020-07-31

## 2020-07-31 RX ADMIN — SODIUM CHLORIDE 1000 ML: 9 INJECTION, SOLUTION INTRAVENOUS at 08:08

## 2020-07-31 RX ADMIN — PANTOPRAZOLE SODIUM 40 MG: 40 INJECTION, POWDER, FOR SOLUTION INTRAVENOUS at 08:08

## 2020-07-31 RX ADMIN — SODIUM CHLORIDE 10 ML: 9 INJECTION, SOLUTION INTRAMUSCULAR; INTRAVENOUS; SUBCUTANEOUS at 08:08

## 2020-07-31 RX ADMIN — ALUMINUM HYDROXIDE, MAGNESIUM HYDROXIDE, AND SIMETHICONE 30 ML: 200; 200; 20 SUSPENSION ORAL at 08:08

## 2020-07-31 RX ADMIN — ONDANSETRON 4 MG: 2 INJECTION INTRAMUSCULAR; INTRAVENOUS at 08:08

## 2020-07-31 RX ADMIN — DICYCLOMINE HYDROCHLORIDE 10 MG: 10 CAPSULE ORAL at 10:08

## 2020-07-31 ASSESSMENT — ENCOUNTER SYMPTOMS
SORE THROAT: 0
COUGH: 0
BACK PAIN: 0
DIARRHEA: 0
SHORTNESS OF BREATH: 0
RHINORRHEA: 0
BLOOD IN STOOL: 0
SINUS PRESSURE: 0
ABDOMINAL PAIN: 1
FACIAL SWELLING: 0
EYE DISCHARGE: 0
EYE PAIN: 0
TROUBLE SWALLOWING: 0
CONSTIPATION: 0
COLOR CHANGE: 0
VOMITING: 1
EYE REDNESS: 0
WHEEZING: 0
CHEST TIGHTNESS: 0
NAUSEA: 1

## 2020-07-31 ASSESSMENT — PAIN DESCRIPTION - DESCRIPTORS: DESCRIPTORS: CRAMPING

## 2020-07-31 ASSESSMENT — PAIN DESCRIPTION - PAIN TYPE: TYPE: ACUTE PAIN

## 2020-07-31 ASSESSMENT — PAIN SCALES - GENERAL: PAINLEVEL_OUTOF10: 7

## 2020-07-31 ASSESSMENT — PAIN DESCRIPTION - LOCATION: LOCATION: ABDOMEN

## 2020-07-31 ASSESSMENT — PAIN DESCRIPTION - ORIENTATION: ORIENTATION: MID;UPPER

## 2020-07-31 NOTE — ED PROVIDER NOTES
[address]  eMERGENCY dEPARTMENT eNCOUnter      Pt Name: Cleveland Boothe  MRN: [id number]  [place] 2000  Date of evaluation: 7/31/20      CHIEF COMPLAINT       Chief Complaint   Patient presents with    Abdominal Pain    Emesis         HISTORY OF PRESENT ILLNESS    Cleveland Boothe is a 21 y.o. female who presents complaining of abdominal pain. Patient states about a week ago she started having pain in her epigastric area. Patient states it progressively got worse and is now severe. Patient states 2 days ago she started having nausea and vomiting is unable to keep anything down. Patient denies any diarrhea. Patient's had no fever or upper respiratory signs. Patient is COVID positive after a contact from healthcare that she was tested for. Patient never had symptoms from that. That was a week and a half ago. Patient denies any vaginal bleeding or discharge. Patient states she has cloudy urine and some burning at the very end of urination. REVIEW OF SYSTEMS       Review of Systems   Constitutional: Negative for activity change, appetite change, chills, diaphoresis and fever. HENT: Negative for congestion, ear pain, facial swelling, nosebleeds, rhinorrhea, sinus pressure, sore throat and trouble swallowing. Eyes: Negative for pain, discharge and redness. Respiratory: Negative for cough, chest tightness, shortness of breath and wheezing. Cardiovascular: Negative for chest pain, palpitations and leg swelling. Gastrointestinal: Positive for abdominal pain, nausea and vomiting. Negative for blood in stool, constipation and diarrhea. Genitourinary: Negative for difficulty urinating, dysuria, flank pain, frequency, genital sores and hematuria. Musculoskeletal: Negative for arthralgias, back pain, gait problem, joint swelling, myalgias and neck pain. Skin: Negative for color change, pallor, rash and wound.    Neurological: Negative for dizziness, tremors, seizures, syncope, speech distress. Breath sounds: Normal breath sounds. No wheezing or rales. Chest:      Chest wall: No tenderness. Abdominal:      General: Bowel sounds are normal. There is no distension. Palpations: Abdomen is soft. There is no mass. Tenderness: There is abdominal tenderness in the epigastric area and suprapubic area. There is no guarding or rebound. Musculoskeletal: Normal range of motion. General: No tenderness. Skin:     General: Skin is warm and dry. Coloration: Skin is not pale. Findings: No erythema or rash. Neurological:      Mental Status: She is alert and oriented to person, place, and time. Cranial Nerves: No cranial nerve deficit. Sensory: No sensory deficit. Motor: No abnormal muscle tone. Coordination: Coordination normal.      Deep Tendon Reflexes: Reflexes normal.   Psychiatric:         Behavior: Behavior normal.         Thought Content: Thought content normal.         Judgment: Judgment normal.         DIAGNOSTIC RESULTS     RADIOLOGY:All plain film, CT,MRI, and formal ultrasound images (except ED bedside ultrasound) are read by the radiologist and the interpretations are directly viewed by the emergency physician. LABS: All lab results were reviewed by myself, and all abnormals are listed below.   Labs Reviewed   CBC WITH AUTO DIFFERENTIAL - Abnormal; Notable for the following components:       Result Value    Seg Neutrophils 74 (*)     Lymphocytes 17 (*)     All other components within normal limits   COMPREHENSIVE METABOLIC PANEL - Abnormal; Notable for the following components:    Glucose 133 (*)     ALT 41 (*)     All other components within normal limits   URINE RT REFLEX TO CULTURE - Abnormal; Notable for the following components:    Ketones, Urine SMALL (*)     Nitrite, Urine POSITIVE (*)     Leukocyte Esterase, Urine SMALL (*)     All other components within normal limits   MICROSCOPIC URINALYSIS - Abnormal; Notable for the following components:    Bacteria, UA MODERATE (*)     All other components within normal limits   CULTURE, URINE   LIPASE   HCG, SERUM, QUALITATIVE         MEDICAL DECISION MAKING:     Patient symptoms are most consistent probably with a gastritis but will do labs to rule out pancreatitis or gallbladder liver issues. EMERGENCY DEPARTMENT COURSE:   Vitals:    Vitals:    07/31/20 0725   BP: 129/63   Pulse: 85   Resp: 14   Temp: 97.9 °F (36.6 °C)   TempSrc: Oral   SpO2: 97%   Weight: 300 lb (136.1 kg)   Height: 5' 8\" (1.727 m)       The patient was given the following medications while in the emergency department:  Orders Placed This Encounter   Medications    0.9 % sodium chloride bolus    ondansetron (ZOFRAN) injection 4 mg    AND Linked Order Group     pantoprazole (PROTONIX) injection 40 mg     sodium chloride (PF) 0.9 % injection 10 mL    aluminum & magnesium hydroxide-simethicone (MAALOX) 200-200-20 MG/5ML suspension 30 mL    dicyclomine (BENTYL) capsule 10 mg    ondansetron (ZOFRAN ODT) 4 MG disintegrating tablet     Sig: Take 1 tablet by mouth every 8 hours as needed for Nausea or Vomiting     Dispense:  10 tablet     Refill:  0    dicyclomine (BENTYL) 10 MG capsule     Sig: Take 1 capsule by mouth every 6 hours as needed (cramps)     Dispense:  20 capsule     Refill:  0    cephALEXin (KEFLEX) 500 MG capsule     Sig: Take 1 capsule by mouth 2 times daily for 7 days     Dispense:  14 capsule     Refill:  0    pantoprazole (PROTONIX) 20 MG tablet     Sig: Take 2 tablets by mouth daily     Dispense:  30 tablet     Refill:  0       -------------------------  9:09 AM EDT  Patient was reevaluated and still states she having pain but she is less nauseous. Patient was updated on results and plan for discharge home with treatment for UTI. CONSULTS:  None    PROCEDURES:  None    FINAL IMPRESSION      1. Epigastric pain    2.  Acute cystitis with hematuria          DISPOSITION/PLAN   DISPOSITION Decision To Discharge 07/31/2020 09:07:43 AM      PATIENT REFERREDTO:  Collette Ek, MD  [address] [facility] [address] [phone]    In 1 week      [facility] ED  [address] [id number]  [phone]    If symptoms worsen      DISCHARGEMEDICATIONS:  New Prescriptions    CEPHALEXIN (KEFLEX) 500 MG CAPSULE    Take 1 capsule by mouth 2 times daily for 7 days    DICYCLOMINE (BENTYL) 10 MG CAPSULE    Take 1 capsule by mouth every 6 hours as needed (cramps)    ONDANSETRON (ZOFRAN ODT) 4 MG DISINTEGRATING TABLET    Take 1 tablet by mouth every 8 hours as needed for Nausea or Vomiting    PANTOPRAZOLE (PROTONIX) 20 MG TABLET    Take 2 tablets by mouth daily       (Please note that portions of this note were completed with a voice recognition program.  Efforts were made to edit thedictations but occasionally words are mis-transcribed.)    Starla Cordero MD  Attending Emergency Physician                        Starla Cordero MD  07/31/20 5835

## 2020-08-01 ENCOUNTER — CARE COORDINATION (OUTPATIENT)
Dept: CARE COORDINATION | Age: 20
End: 2020-08-01

## 2020-08-01 LAB
CULTURE: ABNORMAL
Lab: ABNORMAL
SPECIMEN DESCRIPTION: ABNORMAL

## 2020-08-01 NOTE — CARE COORDINATION
Patient contacted regarding [id number] diagnosis\". Discussed COVID-19 related testing which was test results positive on 2020 at this time. Test results were positive 2020. Patient informed of results, if available? Patient aware of positive results    Care Transition Nurse/ Ambulatory Care Manager contacted the patient by telephone to perform post discharge assessment. Verified name and  with patient as identifiers. Provided introduction to self, and explanation of the CTN/ACM role, and reason for call due to risk factors for infection and/or exposure to COVID-19. Symptoms reviewed with patient who verbalized the following symptoms: nausea, no new symptoms, no worsening symptoms and abdominal cramping. Due to no new or worsening symptoms encounter was not routed to provider for escalation. Discussed follow-up appointments. If no appointment was previously scheduled, appointment scheduling offered: yes, patient wants a new provider, but wants to check who is on her insurance, writer told patient to call writer on Monday [phone] if still wanting a new PCP  [facility] follow up appointment(s):   Future Appointments   Date Time Provider Kathrin Mean   2020  1:30 PM Jeffery Castillo, APRN - CNP OB [address] [address] Madonna Fuentes follow up appointment(s): no     Advance Care Planning:   Does patient have an Advance Directive:  decision maker updated. Patient has following risk factors of: pre-diabetes. CTN/ACM reviewed discharge instructions, medical action plan and red flags such as increased shortness of breath, increasing fever and signs of decompensation with patient who verbalized understanding. Discussed exposure protocols and quarantine with CDC Guidelines What to do if you are sick with coronavirus disease .  Patient was given an opportunity for questions and concerns.  The patient agrees to contact the [facility] exposure line [phone], local health department [place] Department of [facility]: ([phone]) and PCP office for questions related to their healthcare. CTN/ACM provided contact information for future needs. Reviewed and educated patient on any new and changed medications related to discharge diagnosis     Patient/family/caregiver given information for GetWell Loop and agrees to enroll yes  Patient's preferred e-mail: [email or web address]   Patient's preferred phone number: [phone]  Based on Loop alert triggers, patient will be contacted by nurse care manager for worsening symptoms. Pt will be further monitored by COVID Loop Team based on severity of symptoms and risk factors. Patient did  zofran, bentyl, keflex and protonix from pharmacy and has started taking.

## 2020-08-03 ENCOUNTER — HOSPITAL ENCOUNTER (EMERGENCY)
Age: 20
Discharge: HOME | End: 2020-08-03
Attending: EMERGENCY MEDICINE
Payer: COMMERCIAL

## 2020-08-03 VITALS
OXYGEN SATURATION: 98 % | RESPIRATION RATE: 20 BRPM | DIASTOLIC BLOOD PRESSURE: 65 MMHG | TEMPERATURE: 98 F | WEIGHT: 288 LBS | HEART RATE: 92 BPM | HEIGHT: 68 IN | SYSTOLIC BLOOD PRESSURE: 142 MMHG | BODY MASS INDEX: 43.65 KG/M2

## 2020-08-03 PROCEDURE — 99283 EMERGENCY DEPT VISIT LOW MDM: CPT

## 2020-08-03 PROCEDURE — 6370000000 HC RX 637 (ALT 250 FOR IP): Performed by: EMERGENCY MEDICINE

## 2020-08-03 RX ORDER — MAGNESIUM HYDROXIDE/ALUMINUM HYDROXICE/SIMETHICONE 120; 1200; 1200 MG/30ML; MG/30ML; MG/30ML
15 SUSPENSION ORAL
Status: COMPLETED | OUTPATIENT
Start: 2020-08-03 | End: 2020-08-03

## 2020-08-03 RX ORDER — METOCLOPRAMIDE 10 MG/1
10 TABLET ORAL 4 TIMES DAILY
Qty: 20 TABLET | Refills: 0 | Status: SHIPPED | OUTPATIENT
Start: 2020-08-03 | End: 2020-09-03 | Stop reason: SDUPTHER

## 2020-08-03 RX ORDER — SUCRALFATE ORAL 1 G/10ML
1 SUSPENSION ORAL 4 TIMES DAILY
Qty: 400 ML | Refills: 0 | Status: SHIPPED | OUTPATIENT
Start: 2020-08-03 | End: 2021-02-23

## 2020-08-03 RX ORDER — LIDOCAINE HYDROCHLORIDE 20 MG/ML
15 SOLUTION OROPHARYNGEAL
Status: DISCONTINUED | OUTPATIENT
Start: 2020-08-03 | End: 2020-08-03 | Stop reason: HOSPADM

## 2020-08-03 RX ORDER — METOCLOPRAMIDE 10 MG/1
10 TABLET ORAL ONCE
Status: COMPLETED | OUTPATIENT
Start: 2020-08-03 | End: 2020-08-03

## 2020-08-03 RX ADMIN — LIDOCAINE HYDROCHLORIDE 15 ML: 20 SOLUTION ORAL; TOPICAL at 20:08

## 2020-08-03 RX ADMIN — METOCLOPRAMIDE 10 MG: 10 TABLET ORAL at 19:46

## 2020-08-03 RX ADMIN — ALUMINUM HYDROXIDE, MAGNESIUM HYDROXIDE, AND SIMETHICONE 15 ML: 200; 200; 20 SUSPENSION ORAL at 20:08

## 2020-08-03 ASSESSMENT — ENCOUNTER SYMPTOMS
SHORTNESS OF BREATH: 0
COUGH: 0
CONSTIPATION: 0
ABDOMINAL PAIN: 1
NAUSEA: 1
FACIAL SWELLING: 0
VOMITING: 1
SORE THROAT: 0
EYE PAIN: 0
CHEST TIGHTNESS: 0
COLOR CHANGE: 0
SINUS PRESSURE: 0
BACK PAIN: 0
TROUBLE SWALLOWING: 0
EYE DISCHARGE: 0
WHEEZING: 0
EYE REDNESS: 0
RHINORRHEA: 0
BLOOD IN STOOL: 0
DIARRHEA: 0

## 2020-08-03 ASSESSMENT — PAIN DESCRIPTION - ORIENTATION: ORIENTATION: ANTERIOR;MID

## 2020-08-03 ASSESSMENT — PAIN DESCRIPTION - DESCRIPTORS: DESCRIPTORS: CRAMPING;TIGHTNESS

## 2020-08-03 ASSESSMENT — PAIN DESCRIPTION - FREQUENCY: FREQUENCY: CONTINUOUS

## 2020-08-03 ASSESSMENT — PAIN DESCRIPTION - ONSET: ONSET: PROGRESSIVE

## 2020-08-03 ASSESSMENT — PAIN SCALES - GENERAL: PAINLEVEL_OUTOF10: 7

## 2020-08-03 ASSESSMENT — PAIN DESCRIPTION - PAIN TYPE: TYPE: ACUTE PAIN

## 2020-08-03 NOTE — ED PROVIDER NOTES
[address]  eMERGENCY dEPARTMENT eNCOUnter      Pt Name: James Bella  MRN: [id number]  [place] 2000  Date of evaluation: 8/3/20      CHIEF COMPLAINT       Chief Complaint   Patient presents with    Abdominal Pain     currently being treated for UTI. Patient denies any relief of pain.  Dysuria    Concern For COVID-19     (+) nasal swab 7/20/2020         HISTORY OF PRESENT ILLNESS    James Bella is a 21 y.o. female who presents complaining of vomiting. Patient states that she has been sick for the past week. Patient was treated and diagnosed with a UTI 4 days ago. Patient states she has been taking the urinary tract infection antibiotics but she is not able to keep anything else down because of vomiting. Patient's not been able to eat or drink. Patient states she has been trying to take the Zofran but in the past she has had problems with her Zofran is made it worse. Patient states that she is not been taking the Protonix like she was told but when she took it this morning it made her throw up. Patient denies any fever. Patient denies cough. Patient was positive for COVID last month after a positive patient encounter. REVIEW OF SYSTEMS       Review of Systems   Constitutional: Negative for activity change, appetite change, chills, diaphoresis and fever. HENT: Negative for congestion, ear pain, facial swelling, nosebleeds, rhinorrhea, sinus pressure, sore throat and trouble swallowing. Eyes: Negative for pain, discharge and redness. Respiratory: Negative for cough, chest tightness, shortness of breath and wheezing. Cardiovascular: Negative for chest pain, palpitations and leg swelling. Gastrointestinal: Positive for abdominal pain, nausea and vomiting. Negative for blood in stool, constipation and diarrhea. Genitourinary: Negative for difficulty urinating, dysuria, flank pain, frequency, genital sores and hematuria.    Musculoskeletal: Negative for arthralgias, back pain, gait problem, joint swelling, myalgias and neck pain. Skin: Negative for color change, pallor, rash and wound. Neurological: Negative for dizziness, tremors, seizures, syncope, speech difficulty, weakness, numbness and headaches. Psychiatric/Behavioral: Negative for confusion, decreased concentration, hallucinations, self-injury, sleep disturbance and suicidal ideas. PAST MEDICAL HISTORY     Past Medical History:   Diagnosis Date    Left leg DVT ([address]     Pre-diabetes     Thyroid disease        SURGICAL HISTORY       Past Surgical History:   Procedure Laterality Date    TONSILLECTOMY AND ADENOIDECTOMY  06/2016       CURRENT MEDICATIONS       Previous Medications    CEPHALEXIN (KEFLEX) 500 MG CAPSULE    Take 1 capsule by mouth 2 times daily for 7 days    CHOLECALCIFEROL (VITAMIN D3) 99244 UNITS CAPS    Take 5,000 Units by mouth Twice a Week    DICYCLOMINE (BENTYL) 10 MG CAPSULE    Take 1 capsule by mouth every 6 hours as needed (cramps)    METFORMIN (GLUCOPHAGE XR) 750 MG EXTENDED RELEASE TABLET    Take 1 tablet by mouth daily (with breakfast)    NORETHINDRONE-ETHINYL ESTRADIOL (OVCON-35, 28,) 0.4-35 MG-MCG PER TABLET    Take 1 tablet by mouth daily    ONDANSETRON (ZOFRAN ODT) 4 MG DISINTEGRATING TABLET    Take 1 tablet by mouth every 8 hours as needed for Nausea or Vomiting    PANTOPRAZOLE (PROTONIX) 20 MG TABLET    Take 2 tablets by mouth daily    THYROID (ARMOUR) 60 MG TABLET    Take 60 mg by mouth daily       ALLERGIES     has No Known Allergies. SOCIAL HISTORY      reports that she has never smoked. She has never used smokeless tobacco. She reports that she does not drink alcohol or use drugs. PHYSICAL EXAM     INITIAL VITALS: BP (!) 142/65   Pulse 92   Temp 98 °F (36.7 °C) (Oral)   Resp 20   Ht 5' 8\" (1.727 m)   Wt 288 lb (130.6 kg)   LMP 07/22/2020   SpO2 98%   BMI 43.79 kg/m²      Physical Exam  Vitals signs and nursing note reviewed.    Constitutional:       General: She is not in acute distress. Appearance: She is well-developed. She is not diaphoretic. HENT:      Head: Normocephalic and atraumatic. Eyes:      General: No scleral icterus. Right eye: No discharge. Left eye: No discharge. Conjunctiva/sclera: Conjunctivae normal.      Pupils: Pupils are equal, round, and reactive to light. Cardiovascular:      Rate and Rhythm: Normal rate and regular rhythm. Heart sounds: Normal heart sounds. No murmur. No friction rub. No gallop. Pulmonary:      Effort: Pulmonary effort is normal. No respiratory distress. Breath sounds: Normal breath sounds. No wheezing or rales. Chest:      Chest wall: No tenderness. Abdominal:      General: Bowel sounds are normal. There is no distension. Palpations: Abdomen is soft. There is no mass. Tenderness: There is no abdominal tenderness. There is no guarding or rebound. Musculoskeletal: Normal range of motion. General: No tenderness. Skin:     General: Skin is warm and dry. Coloration: Skin is not pale. Findings: No erythema or rash. Neurological:      Mental Status: She is alert and oriented to person, place, and time. Cranial Nerves: No cranial nerve deficit. Sensory: No sensory deficit. Motor: No abnormal muscle tone. Coordination: Coordination normal.      Deep Tendon Reflexes: Reflexes normal.   Psychiatric:         Behavior: Behavior normal.         Thought Content: Thought content normal.         Judgment: Judgment normal.         DIAGNOSTIC RESULTS     RADIOLOGY:All plain film, CT,MRI, and formal ultrasound images (except ED bedside ultrasound) are read by the radiologist and the interpretations are directly viewed by the emergency physician. LABS: All lab results were reviewed by myself, and all abnormals are listed below.   Labs Reviewed - No data to display      MEDICAL DECISION MAKING:     Patient is a return visit for abdominal pain and vomiting. Patient is getting treated for a UTI and states that she still has some dysuria but has been able to keep down the antibiotics. Patient is not getting any relief from the Zofran therefore we will try Reglan. If this does not work then I think an IV and admission will be necessary due to her symptoms and the fact that she was COVID positive. EMERGENCY DEPARTMENT COURSE:   Vitals:    Vitals:    08/03/20 1926   BP: (!) 142/65   Pulse: 92   Resp: 20   Temp: 98 °F (36.7 °C)   TempSrc: Oral   SpO2: 98%   Weight: 288 lb (130.6 kg)   Height: 5' 8\" (1.727 m)       The patient was given the following medications while in the emergency department:  Orders Placed This Encounter   Medications    metoclopramide (REGLAN) tablet 10 mg    aluminum & magnesium hydroxide-simethicone (MAALOX) 200-200-20 MG/5ML suspension 15 mL    lidocaine viscous hcl (XYLOCAINE) 2 % solution 15 mL    metoclopramide (REGLAN) 10 MG tablet     Sig: Take 1 tablet by mouth 4 times daily WARNING:  May cause drowsiness. May impair ability to operate vehicles or machinery. Do not use in combination with alcohol. Dispense:  20 tablet     Refill:  0    sucralfate (CARAFATE) 1 GM/10ML suspension     Sig: Take 10 mLs by mouth 4 times daily     Dispense:  400 mL     Refill:  0       -------------------------  8:36 PM EDT  Patient was given Reglan and was able to take oral medication by liquid. This point time we will have her follow-up with GI change her medication to Reglan and Carafate to try to help with her pain and have her follow-up. CONSULTS:  None    PROCEDURES:  none    FINAL IMPRESSION      1.  Abdominal pain, epigastric          DISPOSITION/PLAN   DISPOSITION Decision To Discharge 08/03/2020 08:32:22 PM      PATIENT REFERREDTO:  Mona Whitney MD  [address] [place] [address]  [phone]    In 1 week        DISCHARGEMEDICATIONS:  New Prescriptions    METOCLOPRAMIDE (REGLAN) 10 MG TABLET    Take 1 tablet by mouth 4 times daily WARNING:  May cause drowsiness. May impair ability to operate vehicles or machinery. Do not use in combination with alcohol.     SUCRALFATE (CARAFATE) 1 GM/10ML SUSPENSION    Take 10 mLs by mouth 4 times daily       (Please note that portions of this note were completed with a voice recognition program.  Efforts were made to edit thedictations but occasionally words are mis-transcribed.)    Starla Cordero MD  Attending Emergency Physician                        Starla Cordero MD  08/03/20 [id number]

## 2020-08-04 ENCOUNTER — TELEPHONE (OUTPATIENT)
Dept: GASTROENTEROLOGY | Age: 20
End: 2020-08-04

## 2020-08-04 ENCOUNTER — CARE COORDINATION (OUTPATIENT)
Dept: CARE COORDINATION | Age: 20
End: 2020-08-04

## 2020-08-04 ENCOUNTER — HOSPITAL ENCOUNTER (OUTPATIENT)
Age: 20
Setting detail: SPECIMEN
Discharge: HOME | End: 2020-08-04
Payer: COMMERCIAL

## 2020-08-04 ENCOUNTER — OFFICE VISIT (OUTPATIENT)
Dept: PRIMARY CARE CLINIC | Age: 20
End: 2020-08-04
Payer: COMMERCIAL

## 2020-08-04 VITALS — TEMPERATURE: 97.7 F | OXYGEN SATURATION: 98 % | HEART RATE: 77 BPM

## 2020-08-04 PROCEDURE — 99213 OFFICE O/P EST LOW 20 MIN: CPT | Performed by: NURSE PRACTITIONER

## 2020-08-04 NOTE — CARE COORDINATION
needs to fill Reglan and Carafate at pharmacy. Patient did try to make an appointment with GI and was told she needs 2 negative Covid 19 tests before she can be seen in office. Patient encouraged per writer to utilize [facility].

## 2020-08-04 NOTE — PATIENT INSTRUCTIONS
You were tested for COVID today. We will call you with results when they are available. If you have not heard from us in 7 days, please call our office.      Recheck for worsening, change or concern    Follow up with pcp    Follow up with gastro when able, await test

## 2020-08-04 NOTE — TELEPHONE ENCOUNTER
Patient called to make a NP appointment . As I looked at chart patient tested positive for Covid -19 on 7/26/2020 . I then explained that she needs to wait 2 wks . And get retested  If she is then negative to call and we would schedule her an appointment out . I also explained that she needs two negative test to be seen in the office .  Thank You

## 2020-08-04 NOTE — PROGRESS NOTES
[address] [facility]  [address] [facility] [address] [address]  Dept: [phone]  Dept Fax: [phone]    Pepe Linares a 21 y.o. female who presents to the urgent care today for her medical conditions/complaintsas noted below. Araceli Lewis is c/o of Concern For COVID-19 (needs covid test before seeing gi)      HPI:     Pt was seen on 7/20 and dx with covid 19  States she was seen in er yesterday for nausea, vomiting, abd pain, that is all better today than it was but now needs covid test before seeing gi  Denies fever, cough, loss of taste and smell,dysuria. Only taking prescribed meds as directed  Drinking fluids  Asking for covid test        Past Medical History:   Diagnosis Date    Left leg DVT ([facility])     Pre-diabetes     Thyroid disease        Current Outpatient Medications   Medication Sig Dispense Refill    metoclopramide (REGLAN) 10 MG tablet Take 1 tablet by mouth 4 times daily WARNING:  May cause drowsiness. May impair ability to operate vehicles or machinery. Do not use in combination with alcohol.  20 tablet 0    sucralfate (CARAFATE) 1 GM/10ML suspension Take 10 mLs by mouth 4 times daily 400 mL 0    ondansetron (ZOFRAN ODT) 4 MG disintegrating tablet Take 1 tablet by mouth every 8 hours as needed for Nausea or Vomiting 10 tablet 0    dicyclomine (BENTYL) 10 MG capsule Take 1 capsule by mouth every 6 hours as needed (cramps) 20 capsule 0    cephALEXin (KEFLEX) 500 MG capsule Take 1 capsule by mouth 2 times daily for 7 days 14 capsule 0    pantoprazole (PROTONIX) 20 MG tablet Take 2 tablets by mouth daily 30 tablet 0    metFORMIN (GLUCOPHAGE XR) 750 MG extended release tablet Take 1 tablet by mouth daily (with breakfast) 30 tablet 3    norethindrone-ethinyl estradiol (OVCON-35, 28,) 0.4-35 MG-MCG per tablet Take 1 tablet by mouth daily 1 packet 3    thyroid (ARMOUR) 60 MG tablet Take 60 mg by mouth daily      Cholecalciferol (VITAMIN D3) 89842 units CAPS Take 5,000 Units by mouth Twice a Week       No current facility-administered medications for this visit. No Known Allergies    Subjective:     Review of Systems   All other systems reviewed and are negative. Objective:      Physical Exam  Vitals signs and nursing note reviewed. Constitutional:       General: She is not in acute distress. Appearance: Normal appearance. She is well-developed. She is not ill-appearing, toxic-appearing or diaphoretic. HENT:      Head: Normocephalic and atraumatic. Nose: Nose normal.      Mouth/Throat:      Mouth: Mucous membranes are moist.   Eyes:      General: No scleral icterus. Right eye: No discharge. Left eye: No discharge. Neck:      Musculoskeletal: Normal range of motion and neck supple. No neck rigidity. Cardiovascular:      Rate and Rhythm: Normal rate and regular rhythm. Heart sounds: Normal heart sounds. No murmur. Pulmonary:      Effort: Pulmonary effort is normal. No respiratory distress. Breath sounds: Normal breath sounds. No stridor. No wheezing, rhonchi or rales. Abdominal:      General: Bowel sounds are normal.      Palpations: Abdomen is soft. Tenderness: There is no abdominal tenderness. Musculoskeletal: Normal range of motion. General: No signs of injury. Skin:     General: Skin is warm and dry. Coloration: Skin is not jaundiced or pale. Findings: No erythema or rash. Neurological:      General: No focal deficit present. Mental Status: She is alert and oriented to person, place, and time. Psychiatric:         Mood and Affect: Mood normal.         Behavior: Behavior normal.       Pulse 77   Temp 97.7 °F (36.5 °C) (Temporal)   LMP 07/27/2020 (Approximate)   SpO2 98%     Assessment:          Diagnosis Orders   1. History of 2019 novel coronavirus disease (COVID-19)  Covid-19 Ambulatory       Plan: You were tested for COVID today.  We will call you with results when they are available. If you have not heard from us in 7 days, please call our office. Recheck for worsening, change or concern    Follow up with pcp    Follow up with gastro when able, await test   Return for go to the ER for worsening, change or concern, recheck. Patient given educational materials - see patientinstructions. Discussed use, benefit, and side effects of prescribed medications. All patient questions answered. Pt voiced understanding.     Electronically signed by HILDA Prabhakar 8/4/2020 at 1:57 PM

## 2020-08-05 ENCOUNTER — CARE COORDINATION (OUTPATIENT)
Dept: CARE COORDINATION | Age: 20
End: 2020-08-05

## 2020-08-05 NOTE — CARE COORDINATION
Date/Time:  8/5/2020 11:46 AM  RN attempted to reach patient by telephone regarding yellow alert in Loop remote symptom monitoring program. Unable to leave , full. Comment left in Loop monitoring program with contact information. Meka Patrick, ESME, 11:47 AM   Just wanted to let you know that [facility] has received your message. Thanks for letting us know. We would like to know if your symptoms are improving? If they are worsening and you would like to speak with a nurse, you can reach me at [phone]. ESME Jimenez       received form patient stating she is \"doing fine, no worsening concerns\". Alert dismissed. Tonye Cogan

## 2020-08-08 LAB — SARS-COV-2, NAA: NOT DETECTED

## 2020-08-14 ENCOUNTER — OFFICE VISIT (OUTPATIENT)
Dept: GASTROENTEROLOGY | Age: 20
End: 2020-08-14
Payer: COMMERCIAL

## 2020-08-14 VITALS — BODY MASS INDEX: 44 KG/M2 | TEMPERATURE: 98 F | HEIGHT: 68 IN | WEIGHT: 290.3 LBS

## 2020-08-14 PROBLEM — K58.2 IRRITABLE BOWEL SYNDROME WITH BOTH CONSTIPATION AND DIARRHEA: Status: ACTIVE | Noted: 2020-08-14

## 2020-08-14 PROCEDURE — 99204 OFFICE O/P NEW MOD 45 MIN: CPT | Performed by: INTERNAL MEDICINE

## 2020-08-14 PROCEDURE — APPSS45 APP SPLIT SHARED TIME 31-45 MINUTES: Performed by: NURSE PRACTITIONER

## 2020-08-14 ASSESSMENT — ENCOUNTER SYMPTOMS
CONSTIPATION: 1
ANAL BLEEDING: 0
DIARRHEA: 1
COUGH: 0
TROUBLE SWALLOWING: 0
SORE THROAT: 0
BACK PAIN: 1
SINUS PRESSURE: 0
WHEEZING: 0
ABDOMINAL DISTENTION: 1
NAUSEA: 1
ABDOMINAL PAIN: 1
VOMITING: 1
BLOOD IN STOOL: 0
VOICE CHANGE: 0
RECTAL PAIN: 0
CHOKING: 0

## 2020-08-21 ENCOUNTER — TELEPHONE (OUTPATIENT)
Dept: GASTROENTEROLOGY | Age: 20
End: 2020-08-21

## 2020-08-21 ENCOUNTER — HOSPITAL ENCOUNTER (EMERGENCY)
Age: 20
Discharge: HOME | End: 2020-08-22
Attending: EMERGENCY MEDICINE
Payer: COMMERCIAL

## 2020-08-21 ENCOUNTER — APPOINTMENT (OUTPATIENT)
Dept: GENERAL RADIOLOGY | Age: 20
End: 2020-08-21
Payer: COMMERCIAL

## 2020-08-21 ENCOUNTER — APPOINTMENT (OUTPATIENT)
Dept: CT IMAGING | Age: 20
End: 2020-08-21
Payer: COMMERCIAL

## 2020-08-21 LAB
ABSOLUTE EOS #: 0.1 K/UL (ref 0–0.4)
ABSOLUTE IMMATURE GRANULOCYTE: ABNORMAL K/UL (ref 0–0.3)
ABSOLUTE LYMPH #: 2.9 K/UL (ref 1.2–5.2)
ABSOLUTE MONO #: 1 K/UL (ref 0.1–1.3)
ALBUMIN SERPL-MCNC: 4.6 G/DL (ref 3.5–5.2)
ALBUMIN/GLOBULIN RATIO: ABNORMAL (ref 1–2.5)
ALP BLD-CCNC: 73 U/L (ref 35–104)
ALT SERPL-CCNC: 67 U/L (ref 5–33)
ANION GAP SERPL CALCULATED.3IONS-SCNC: 13 MMOL/L (ref 9–17)
AST SERPL-CCNC: 30 U/L
BASOPHILS # BLD: 1 % (ref 0–2)
BASOPHILS ABSOLUTE: 0.1 K/UL (ref 0–0.2)
BILIRUB SERPL-MCNC: 0.71 MG/DL (ref 0.3–1.2)
BILIRUBIN URINE: NEGATIVE
BUN BLDV-MCNC: 7 MG/DL (ref 6–20)
BUN/CREAT BLD: ABNORMAL (ref 9–20)
CALCIUM SERPL-MCNC: 9.4 MG/DL (ref 8.6–10.4)
CHLORIDE BLD-SCNC: 105 MMOL/L (ref 98–107)
CO2: 21 MMOL/L (ref 20–31)
COLOR: YELLOW
COMMENT UA: ABNORMAL
CREAT SERPL-MCNC: 0.53 MG/DL (ref 0.5–0.9)
DIFFERENTIAL TYPE: ABNORMAL
EOSINOPHILS RELATIVE PERCENT: 1 % (ref 0–4)
GFR AFRICAN AMERICAN: >60 ML/MIN
GFR NON-AFRICAN AMERICAN: >60 ML/MIN
GFR SERPL CREATININE-BSD FRML MDRD: ABNORMAL ML/MIN/{1.73_M2}
GFR SERPL CREATININE-BSD FRML MDRD: ABNORMAL ML/MIN/{1.73_M2}
GLUCOSE BLD-MCNC: 91 MG/DL (ref 70–99)
GLUCOSE URINE: NEGATIVE
HCG QUALITATIVE: NEGATIVE
HCT VFR BLD CALC: 43.7 % (ref 36–46)
HEMOGLOBIN: 14.6 G/DL (ref 12–16)
IMMATURE GRANULOCYTES: ABNORMAL %
KETONES, URINE: ABNORMAL
LEUKOCYTE ESTERASE, URINE: NEGATIVE
LIPASE: 18 U/L (ref 13–60)
LYMPHOCYTES # BLD: 21 % (ref 25–45)
MCH RBC QN AUTO: 30.3 PG (ref 26–34)
MCHC RBC AUTO-ENTMCNC: 33.5 G/DL (ref 31–37)
MCV RBC AUTO: 90.4 FL (ref 80–100)
MONOCYTES # BLD: 7 % (ref 2–8)
NITRITE, URINE: NEGATIVE
NRBC AUTOMATED: ABNORMAL PER 100 WBC
PDW BLD-RTO: 12.4 % (ref 11.5–14.9)
PH UA: 7 (ref 5–8)
PLATELET # BLD: 350 K/UL (ref 150–450)
PLATELET ESTIMATE: ABNORMAL
PMV BLD AUTO: 9.1 FL (ref 6–12)
POTASSIUM SERPL-SCNC: 3.9 MMOL/L (ref 3.7–5.3)
PROTEIN UA: NEGATIVE
RBC # BLD: 4.84 M/UL (ref 4–5.2)
RBC # BLD: ABNORMAL 10*6/UL
SEG NEUTROPHILS: 70 % (ref 34–64)
SEGMENTED NEUTROPHILS ABSOLUTE COUNT: 9.9 K/UL (ref 1.3–9.1)
SODIUM BLD-SCNC: 139 MMOL/L (ref 135–144)
SPECIFIC GRAVITY UA: 1.02 (ref 1–1.03)
TOTAL PROTEIN: 7.6 G/DL (ref 6.4–8.3)
TURBIDITY: CLEAR
URINE HGB: NEGATIVE
UROBILINOGEN, URINE: NORMAL
WBC # BLD: 14 K/UL (ref 4.5–13.5)
WBC # BLD: ABNORMAL 10*3/UL

## 2020-08-21 PROCEDURE — 84703 CHORIONIC GONADOTROPIN ASSAY: CPT

## 2020-08-21 PROCEDURE — 6360000002 HC RX W HCPCS: Performed by: STUDENT IN AN ORGANIZED HEALTH CARE EDUCATION/TRAINING PROGRAM

## 2020-08-21 PROCEDURE — 83690 ASSAY OF LIPASE: CPT

## 2020-08-21 PROCEDURE — 6360000004 HC RX CONTRAST MEDICATION: Performed by: EMERGENCY MEDICINE

## 2020-08-21 PROCEDURE — 96372 THER/PROPH/DIAG INJ SC/IM: CPT

## 2020-08-21 PROCEDURE — 81003 URINALYSIS AUTO W/O SCOPE: CPT

## 2020-08-21 PROCEDURE — 74177 CT ABD & PELVIS W/CONTRAST: CPT

## 2020-08-21 PROCEDURE — 85025 COMPLETE CBC W/AUTO DIFF WBC: CPT

## 2020-08-21 PROCEDURE — 2580000003 HC RX 258: Performed by: EMERGENCY MEDICINE

## 2020-08-21 PROCEDURE — 80053 COMPREHEN METABOLIC PANEL: CPT

## 2020-08-21 PROCEDURE — 36415 COLL VENOUS BLD VENIPUNCTURE: CPT

## 2020-08-21 PROCEDURE — 99284 EMERGENCY DEPT VISIT MOD MDM: CPT

## 2020-08-21 RX ORDER — HALOPERIDOL 5 MG/ML
5 INJECTION INTRAMUSCULAR ONCE
Status: COMPLETED | OUTPATIENT
Start: 2020-08-21 | End: 2020-08-21

## 2020-08-21 RX ORDER — SODIUM CHLORIDE 0.9 % (FLUSH) 0.9 %
10 SYRINGE (ML) INJECTION PRN
Status: DISCONTINUED | OUTPATIENT
Start: 2020-08-21 | End: 2020-08-22 | Stop reason: HOSPADM

## 2020-08-21 RX ORDER — 0.9 % SODIUM CHLORIDE 0.9 %
1000 INTRAVENOUS SOLUTION INTRAVENOUS ONCE
Status: COMPLETED | OUTPATIENT
Start: 2020-08-21 | End: 2020-08-22

## 2020-08-21 RX ORDER — 0.9 % SODIUM CHLORIDE 0.9 %
80 INTRAVENOUS SOLUTION INTRAVENOUS ONCE
Status: COMPLETED | OUTPATIENT
Start: 2020-08-21 | End: 2020-08-21

## 2020-08-21 RX ADMIN — HALOPERIDOL LACTATE 5 MG: 5 INJECTION, SOLUTION INTRAMUSCULAR at 23:03

## 2020-08-21 RX ADMIN — Medication 10 ML: at 23:44

## 2020-08-21 RX ADMIN — IOVERSOL 75 ML: 741 INJECTION INTRA-ARTERIAL; INTRAVENOUS at 23:43

## 2020-08-21 RX ADMIN — SODIUM CHLORIDE 80 ML: 9 INJECTION, SOLUTION INTRAVENOUS at 23:43

## 2020-08-21 RX ADMIN — SODIUM CHLORIDE 1000 ML: 9 INJECTION, SOLUTION INTRAVENOUS at 23:03

## 2020-08-21 ASSESSMENT — PAIN SCALES - GENERAL: PAINLEVEL_OUTOF10: 7

## 2020-08-21 NOTE — TELEPHONE ENCOUNTER
Writer called and spoke with Johnathan French. He states patient is so constipated it is making her sick. She will not go to ER. He is advised for patient to use a suppository for the constipation, and her Zofran script for the nausea. If she this doesn't help and patient is willing, she could go to urgent care. He verbalizes understanding.

## 2020-08-21 NOTE — TELEPHONE ENCOUNTER
Father of patient called states his daughter is having, nausea, vomiting and constipation and in tears . I explained to him that he could take her to the ER he states they have been there twice and they haven't done anything. I moved his daughters appointment up to 8/24/2020. He asked if there is something over the counter they could get to help over the weekend until she get in on Monday. Please return his call to [phone].  Thank You

## 2020-08-22 VITALS
WEIGHT: 290 LBS | TEMPERATURE: 97.9 F | OXYGEN SATURATION: 98 % | DIASTOLIC BLOOD PRESSURE: 93 MMHG | BODY MASS INDEX: 43.95 KG/M2 | HEART RATE: 82 BPM | HEIGHT: 68 IN | SYSTOLIC BLOOD PRESSURE: 131 MMHG | RESPIRATION RATE: 16 BRPM

## 2020-08-22 RX ORDER — POLYETHYLENE GLYCOL 3350 17 G/17G
17 POWDER, FOR SOLUTION ORAL DAILY PRN
Qty: 510 G | Refills: 0 | Status: SHIPPED | OUTPATIENT
Start: 2020-08-22 | End: 2020-09-21

## 2020-08-22 ASSESSMENT — ENCOUNTER SYMPTOMS
NAUSEA: 1
SHORTNESS OF BREATH: 0
ABDOMINAL PAIN: 1
BACK PAIN: 0
CONSTIPATION: 1
VOMITING: 0

## 2020-08-22 NOTE — ED PROVIDER NOTES
[facility] ED  Emergency Department Encounter  Emergency Medicine Resident     Pt Name: Samina Anderson  MRN: [id number]  [place] 2000  Date of evaluation: 8/21/20  PCP:  Betsy Beard MD    [address] [place] [address]       Chief Complaint   Patient presents with    Abdominal Pain       HISTORY OFPRESENT ILLNESS  (Location/Symptom, Timing/Onset, Context/Setting, Quality, Duration, Modifying Factors,Severity.)      Samina Anderson is a 21 y. o.yo female who presents with abdominal pain. Patient complaining of abdominal pain for the past week has a history of chronic constipation was scheduled to see a GI doctor but says that she felt better and did not see him. States he has been having nausea and vomiting has thrown up twice today. Denies any fevers or chills, shortness of breath or chest pain. States that she feels a discomfort in the epigastric region. Has been seen in the emergency department multiple times for abdominal pain in the past month, states that she thinks she might have viable bowel syndrome, does have a history of anxiety. States that she is tried to drink mag citrate at home and Toradol. PAST MEDICAL / SURGICAL / SOCIAL / FAMILY HISTORY      has a past medical history of Left leg DVT ([address] PCOS (polycystic ovarian syndrome), Pre-diabetes, and Thyroid disease. has a past surgical history that includes Tonsillectomy and adenoidectomy (06/2016).      Social History     Socioeconomic History    Marital status: Single     Spouse name: Not on file    Number of children: Not on file    Years of education: Not on file    Highest education level: Not on file   Occupational History    Not on file   Social Needs    Financial resource strain: Not on file    Food insecurity     Worry: Not on file     Inability: Not on file    Transportation needs     Medical: Not on file     Non-medical: Not on file   Tobacco Use    Smoking status: Light Tobacco Smoker    Smokeless tobacco: Never Used   Substance and Sexual Activity    Alcohol use: No    Drug use: Yes     Types: Marijuana     Comment: 1 once a day    Sexual activity: Yes     Partners: Male   Lifestyle    Physical activity     Days per week: Not on file     Minutes per session: Not on file    Stress: Not on file   Relationships    Social connections     Talks on phone: Not on file     Gets together: Not on file     Attends [employer] service: Not on file     Active member of club or organization: Not on file     Attends meetings of clubs or organizations: Not on file     Relationship status: Not on file    Intimate partner violence     Fear of current or ex partner: Not on file     Emotionally abused: Not on file     Physically abused: Not on file     Forced sexual activity: Not on file   Other Topics Concern    Not on file   Social History Narrative    Not on file       Family History   Problem Relation Age of Onset    Diabetes Father     Sleep Apnea Father     No Known Problems Mother         Allergies:  Patient has no known allergies. Home Medications:  Prior to Admission medications    Medication Sig Start Date End Date Taking? Authorizing Provider   polyethylene glycol (GLYCOLAX) 17 GM/SCOOP powder Take 17 g by mouth daily as needed (constipation) 8/22/20 9/21/20 Yes Orlando Saab MD   metoclopramide (REGLAN) 10 MG tablet Take 1 tablet by mouth 4 times daily WARNING:  May cause drowsiness. May impair ability to operate vehicles or machinery. Do not use in combination with alcohol.   Patient not taking: Reported on 8/14/2020 8/3/20   Fanta Nguyen MD   sucralfate (CARAFATE) 1 GM/10ML suspension Take 10 mLs by mouth 4 times daily 8/3/20   Fanta Nguyen MD   ondansetron (ZOFRAN ODT) 4 MG disintegrating tablet Take 1 tablet by mouth every 8 hours as needed for Nausea or Vomiting  Patient not taking: Reported on 8/14/2020 7/31/20   Fanta Nguyen MD   dicyclomine (BENTYL) 10 MG capsule Take 1 capsule by mouth every 6 hours as needed (cramps)  Patient not taking: Reported on 8/14/2020 7/31/20   Nuzhat Khoury MD   pantoprazole (PROTONIX) 20 MG tablet Take 2 tablets by mouth daily 7/31/20   Nuzhat Khoury MD   metFORMIN (GLUCOPHAGE XR) 750 MG extended release tablet Take 1 tablet by mouth daily (with breakfast) 6/30/20   HILDA Villagomez CNP   norethindrone-ethinyl estradiol (OVCON-35, 28,) 0.4-35 MG-MCG per tablet Take 1 tablet by mouth daily  Patient not taking: Reported on 8/14/2020 6/30/20   HILDA Villagomez CNP   thyroid (ARMOUR) 60 MG tablet Take 60 mg by mouth daily    Historical Provider, MD   Cholecalciferol (VITAMIN D3) 71064 units CAPS Take 5,000 Units by mouth Twice a Week    Historical Provider, MD       REVIEW OFSYSTEMS    (2-9 systems for level 4, 10 or more for level 5)      Review of Systems   Constitutional: Negative for diaphoresis and fever. HENT: Negative for congestion. Eyes: Negative for visual disturbance. Respiratory: Negative for shortness of breath. Cardiovascular: Negative for chest pain. Gastrointestinal: Positive for abdominal pain, constipation and nausea. Negative for vomiting. Endocrine: Negative for polyuria. Genitourinary: Negative for dysuria. Musculoskeletal: Negative for back pain. Skin: Negative for wound. Neurological: Negative for headaches. Psychiatric/Behavioral: Negative for confusion. PHYSICAL EXAM   (up to 7 for level 4, 8 or more forlevel 5)      ED TRIAGE VITALS BP: (!) 163/81, Temp: 97.9 °F (36.6 °C), Pulse: 82, Resp: 16, SpO2: 98 %    Vitals:    08/21/20 2044 08/21/20 2330   BP: (!) 163/81 101/65   Pulse: 82    Resp: 16    Temp: 97.9 °F (36.6 °C)    SpO2: 98% 99%   Weight: 290 lb (131.5 kg)    Height: 5' 8\" (1.727 m)        Physical Exam  Constitutional:       General: She is not in acute distress. Appearance: She is well-developed. HENT:      Head: Normocephalic and atraumatic.       Nose: Nose normal.   Eyes:      Pupils: Pupils are equal, round, and reactive to light. Neck:      Musculoskeletal: Normal range of motion and neck supple. Cardiovascular:      Rate and Rhythm: Normal rate and regular rhythm. Heart sounds: No murmur. Pulmonary:      Effort: Pulmonary effort is normal. No respiratory distress. Breath sounds: No stridor. No wheezing. Abdominal:      General: There is no distension. Palpations: Abdomen is soft. Tenderness: There is generalized abdominal tenderness and tenderness in the left upper quadrant and left lower quadrant. Musculoskeletal: Normal range of motion. General: No tenderness. Skin:     General: Skin is warm and dry. Capillary Refill: Capillary refill takes less than 2 seconds. Findings: No erythema or rash. Neurological:      Mental Status: She is alert and oriented to person, place, and time. Sensory: No sensory deficit.       Deep Tendon Reflexes: Reflexes normal.   Psychiatric:         Behavior: Behavior normal.         DIFFERENTIAL  DIAGNOSIS     PLAN (LABS / IMAGING / EKG):  Orders Placed This Encounter   Procedures    CT ABDOMEN PELVIS W IV CONTRAST Additional Contrast? None    CBC Auto Differential    Comprehensive Metabolic Panel w/ Reflex to MG    Lipase    HCG Qualitative, Serum    Urinalysis Reflex to Culture       MEDICATIONS ORDERED:  Orders Placed This Encounter   Medications    haloperidol lactate (HALDOL) injection 5 mg    0.9 % sodium chloride bolus    0.9 % sodium chloride bolus    ioversol (OPTIRAY) 74 % injection 75 mL    sodium chloride flush 0.9 % injection 10 mL    polyethylene glycol (GLYCOLAX) 17 GM/SCOOP powder     Sig: Take 17 g by mouth daily as needed (constipation)     Dispense:  510 g     Refill:  0       DDX:     Constipation  Diverticulitis  Pancreatitis  Inflammatory bowel   small bowel obstruction  Pregnancy    Initial MDM/Plan: 21 y.o. female who presents with abdominal pain, chronic constipation, generalized abdominal pain worse on the left mid abdomen area, no blood in her bowel movement is passing gas, no fevers or chills, physical exam does not show any kind of rash or bloating or tympanic signs in the abdomen. Will get basic lab work started including urinalysis and KUB versus CT abdomen.     DIAGNOSTIC RESULTS / EMERGENCYDEPARTMENT COURSE / MDM     LABS:  Results for orders placed or performed during the hospital encounter of 08/21/20   CBC Auto Differential   Result Value Ref Range    WBC 14.0 (H) 4.5 - 13.5 k/uL    RBC 4.84 4.0 - 5.2 m/uL    Hemoglobin 14.6 12.0 - 16.0 g/dL    Hematocrit 43.7 36 - 46 %    MCV 90.4 80 - 100 fL    MCH 30.3 26 - 34 pg    MCHC 33.5 31 - 37 g/dL    RDW 12.4 11.5 - 14.9 %    Platelets 974 168 - 607 k/uL    MPV 9.1 6.0 - 12.0 fL    NRBC Automated NOT REPORTED per 100 WBC    Differential Type NOT REPORTED     Seg Neutrophils 70 (H) 34 - 64 %    Lymphocytes 21 (L) 25 - 45 %    Monocytes 7 2 - 8 %    Eosinophils % 1 0 - 4 %    Basophils 1 0 - 2 %    Immature Granulocytes NOT REPORTED 0 %    Segs Absolute 9.90 (H) 1.3 - 9.1 k/uL    Absolute Lymph # 2.90 1.2 - 5.2 k/uL    Absolute Mono # 1.00 0.1 - 1.3 k/uL    Absolute Eos # 0.10 0.0 - 0.4 k/uL    Basophils Absolute 0.10 0.0 - 0.2 k/uL    Absolute Immature Granulocyte NOT REPORTED 0.00 - 0.30 k/uL    WBC Morphology NOT REPORTED     RBC Morphology NOT REPORTED     Platelet Estimate NOT REPORTED    Comprehensive Metabolic Panel w/ Reflex to MG   Result Value Ref Range    Glucose 91 70 - 99 mg/dL    BUN 7 6 - 20 mg/dL    CREATININE 0.53 0.50 - 0.90 mg/dL    Bun/Cre Ratio NOT REPORTED 9 - 20    Calcium 9.4 8.6 - 10.4 mg/dL    Sodium 139 135 - 144 mmol/L    Potassium 3.9 3.7 - 5.3 mmol/L    Chloride 105 98 - 107 mmol/L    CO2 21 20 - 31 mmol/L    Anion Gap 13 9 - 17 mmol/L    Alkaline Phosphatase 73 35 - 104 U/L    ALT 67 (H) 5 - 33 U/L    AST 30 <32 U/L    Total Bilirubin 0.71 0.3 - 1.2 mg/dL    Total Protein 7.6 6.4 - 8.3 g/dL

## 2020-08-24 ENCOUNTER — TELEPHONE (OUTPATIENT)
Dept: GASTROENTEROLOGY | Age: 20
End: 2020-08-24

## 2020-08-24 ENCOUNTER — CARE COORDINATION (OUTPATIENT)
Dept: CARE COORDINATION | Age: 20
End: 2020-08-24

## 2020-08-24 ENCOUNTER — PATIENT MESSAGE (OUTPATIENT)
Dept: GASTROENTEROLOGY | Age: 20
End: 2020-08-24

## 2020-08-24 ENCOUNTER — OFFICE VISIT (OUTPATIENT)
Dept: GASTROENTEROLOGY | Age: 20
End: 2020-08-24
Payer: COMMERCIAL

## 2020-08-24 VITALS — BODY MASS INDEX: 42.62 KG/M2 | HEIGHT: 68 IN | TEMPERATURE: 97.3 F | WEIGHT: 281.2 LBS

## 2020-08-24 PROCEDURE — 99213 OFFICE O/P EST LOW 20 MIN: CPT | Performed by: INTERNAL MEDICINE

## 2020-08-24 RX ORDER — CITALOPRAM 10 MG/1
10 TABLET ORAL DAILY
Qty: 90 TABLET | Refills: 1 | Status: ON HOLD | OUTPATIENT
Start: 2020-08-24 | End: 2020-09-08

## 2020-08-24 ASSESSMENT — ENCOUNTER SYMPTOMS
BLOOD IN STOOL: 0
WHEEZING: 0
COUGH: 0
ANAL BLEEDING: 0
SINUS PRESSURE: 0
DIARRHEA: 1
SORE THROAT: 0
ABDOMINAL PAIN: 1
CONSTIPATION: 1
RECTAL PAIN: 0
BACK PAIN: 1
CHOKING: 0
VOMITING: 1
NAUSEA: 1
ABDOMINAL DISTENTION: 1
VOICE CHANGE: 0
TROUBLE SWALLOWING: 0

## 2020-08-24 NOTE — PROGRESS NOTES
Reason for Referral:   No referring provider defined for this encounter. 1. Constipation, unspecified constipation type    2. Nausea and vomiting, intractability of vomiting not specified, unspecified vomiting type        HISTORY OF PRESENT ILLNESS:     Chief Complaint   Patient presents with    Constipation     Patient states that there is slight since last appointment. Patient states that there is unigested food in her stool. Shanita was seen in the ER on the 21st. @ [facility]. Abdominal Pain     Patient stated that the pain went away after she changed her diet. But 5 days ago the pain returned     Patient being seen for the nausea, vomiting, constipation. Patient was recently seen in the office. At that time she was doing well. No nausea, vomiting, abdominal pain, diarrhea, etc.      At present she reports constipation, nausea, and vomiting x5 days. Feels abdominal pressure. She is taking multiple laxatives and stool softeners. Reports loose stools but still feels \"backed up\". Occasionally has scant amount of blood on the tissue after bowel movements. Has intermittent nausea, vomiting. Smokes marijuana daily. Appears to have  30 pound weight loss in the last 5 months. No reported food allergies. No fevers, chills. She vaped in the past.  Does not drink alcohol. Has anxiety, depression. Previous labs reviewed. Unremarkable. TSH, lipase, CMP, CBC within normal limits. CT abdomen and pelvis showed no acute process. Has mild stool burden. Past Medical,Family, and Social History reviewed and does contribute to the patient presentingcondition. Patient's PMH/PSH,SH,PSYCH Hx, MEDs, ALLERGIES, and ROS were all reviewed and updated in the appropriate sections.     PAST MEDICAL HISTORY:  Past Medical History:   Diagnosis Date    Left leg DVT ([address]     PCOS (polycystic ovarian syndrome)     Pre-diabetes     Thyroid disease        Past Surgical History:   Procedure Laterality Date    TONSILLECTOMY AND ADENOIDECTOMY  06/2016       CURRENT MEDICATIONS:    Current Outpatient Medications:     polyethylene glycol (GLYCOLAX) 17 GM/SCOOP powder, Take 17 g by mouth daily as needed (constipation), Disp: 510 g, Rfl: 0    metoclopramide (REGLAN) 10 MG tablet, Take 1 tablet by mouth 4 times daily WARNING:  May cause drowsiness. May impair ability to operate vehicles or machinery.   Do not use in combination with alcohol., Disp: 20 tablet, Rfl: 0    metFORMIN (GLUCOPHAGE XR) 750 MG extended release tablet, Take 1 tablet by mouth daily (with breakfast), Disp: 30 tablet, Rfl: 3    sucralfate (CARAFATE) 1 GM/10ML suspension, Take 10 mLs by mouth 4 times daily (Patient not taking: Reported on 8/24/2020), Disp: 400 mL, Rfl: 0    ondansetron (ZOFRAN ODT) 4 MG disintegrating tablet, Take 1 tablet by mouth every 8 hours as needed for Nausea or Vomiting (Patient not taking: Reported on 8/24/2020), Disp: 10 tablet, Rfl: 0    dicyclomine (BENTYL) 10 MG capsule, Take 1 capsule by mouth every 6 hours as needed (cramps) (Patient not taking: Reported on 8/24/2020), Disp: 20 capsule, Rfl: 0    pantoprazole (PROTONIX) 20 MG tablet, Take 2 tablets by mouth daily (Patient not taking: Reported on 8/24/2020), Disp: 30 tablet, Rfl: 0    norethindrone-ethinyl estradiol (OVCON-35, 28,) 0.4-35 MG-MCG per tablet, Take 1 tablet by mouth daily (Patient not taking: Reported on 8/24/2020), Disp: 1 packet, Rfl: 3    thyroid (ARMOUR) 60 MG tablet, Take 60 mg by mouth daily, Disp: , Rfl:     Cholecalciferol (VITAMIN D3) 50859 units CAPS, Take 5,000 Units by mouth Twice a Week, Disp: , Rfl:     ALLERGIES:   No Known Allergies    FAMILY HISTORY:       Problem Relation Age of Onset    Diabetes Father     Sleep Apnea Father     No Known Problems Mother          SOCIAL HISTORY:   Social History     Socioeconomic History    Marital status: Single     Spouse name: Not on file    Number of children: Not on file  Years of education: Not on file    Highest education level: Not on file   Occupational History    Not on file   Social Needs    Financial resource strain: Not on file    Food insecurity     Worry: Not on file     Inability: Not on file    Transportation needs     Medical: Not on file     Non-medical: Not on file   Tobacco Use    Smoking status: Former Smoker     Last attempt to quit: 2020     Years since quittin.4    Smokeless tobacco: Never Used   Substance and Sexual Activity    Alcohol use: No    Drug use: Yes     Types: Marijuana     Comment: 1 once a day    Sexual activity: Yes     Partners: Male   Lifestyle    Physical activity     Days per week: Not on file     Minutes per session: Not on file    Stress: Not on file   Relationships    Social connections     Talks on phone: Not on file     Gets together: Not on file     Attends [employer] service: Not on file     Active member of club or organization: Not on file     Attends meetings of clubs or organizations: Not on file     Relationship status: Not on file    Intimate partner violence     Fear of current or ex partner: Not on file     Emotionally abused: Not on file     Physically abused: Not on file     Forced sexual activity: Not on file   Other Topics Concern    Not on file   Social History Narrative    Not on file       REVIEW OF SYSTEMS:       Review of Systems   Constitutional: Negative for appetite change (decrease), fatigue and unexpected weight change. HENT: Negative for dental problem, postnasal drip, sinus pressure, sore throat, trouble swallowing and voice change. Eyes: Positive for visual disturbance (glasses). Respiratory: Negative for cough, choking and wheezing. Cardiovascular: Negative for chest pain, palpitations and leg swelling. Gastrointestinal: Positive for abdominal distention, abdominal pain, constipation, diarrhea, nausea and vomiting. Negative for anal bleeding, blood in stool and rectal pain. ecchymosis, erythema or rash. Neurological:      Mental Status: She is alert and oriented to person, place, and time. Cranial Nerves: No cranial nerve deficit. Psychiatric:         Thought Content: Thought content normal.           LABORATORY DATA: Reviewed  Lab Results   Component Value Date    WBC 14.0 (H) 08/21/2020    HGB 14.6 08/21/2020    HCT 43.7 08/21/2020    MCV 90.4 08/21/2020     08/21/2020     08/21/2020    K 3.9 08/21/2020     08/21/2020    CO2 21 08/21/2020    BUN 7 08/21/2020    CREATININE 0.53 08/21/2020    LABALBU 4.6 08/21/2020    BILITOT 0.71 08/21/2020    ALKPHOS 73 08/21/2020    AST 30 08/21/2020    ALT 67 (H) 08/21/2020         Lab Results   Component Value Date    RBC 4.84 08/21/2020    HGB 14.6 08/21/2020    MCV 90.4 08/21/2020    MCH 30.3 08/21/2020    MCHC 33.5 08/21/2020    RDW 12.4 08/21/2020    MPV 9.1 08/21/2020    BASOPCT 1 08/21/2020    LYMPHSABS 2.90 08/21/2020    MONOSABS 1.00 08/21/2020    NEUTROABS 9.90 (H) 08/21/2020    EOSABS 0.10 08/21/2020    BASOSABS 0.10 08/21/2020         DIAGNOSTIC TESTING:     Ct Abdomen Pelvis W Iv Contrast Additional Contrast? None    Result Date: 8/22/2020  EXAMINATION: CT OF THE ABDOMEN AND PELVIS WITH CONTRAST 8/21/2020 11:35 pm TECHNIQUE: CT of the abdomen and pelvis was performed with the administration of intravenous contrast. Multiplanar reformatted images are provided for review. Dose modulation, iterative reconstruction, and/or weight based adjustment of the mA/kV was utilized to reduce the radiation dose to as low as reasonably achievable. COMPARISON: None. HISTORY: ORDERING SYSTEM PROVIDED HISTORY: abdominal pain with leukocytosis, eval for diverticulitis TECHNOLOGIST PROVIDED HISTORY: abdominal pain with leukocytosis, eval for diverticulitis Reason for Exam: Abdominal pain w/ n/v x 3 weels per pt.  Acuity: Acute Type of Exam: Initial Relevant Medical/Surgical History: Pt has a hx of IBS, PCOS, no surgeries to area of interest FINDINGS: Lower Chest: Lung bases are clear Organs: Liver, gallbladder, spleen, adrenal glands, kidneys, and pancreas are unremarkable. GI/Bowel: Mild retained stool throughout colon. Appendix unremarkable. Few scattered colonic diverticula. Pelvis: Bladder is unremarkable. Uterus unremarkable. No suspicious adnexal mass. Peritoneum/Retroperitoneum: No free air or free fluid. Aorta is unremarkable caliber. Bones/Soft Tissues: Multilevel degenerate changes of the lumbar spine identified. There suggestion of moderate to large disc extrusions L3-4 and L4-5 causing central canal narrowing. Suspect a moderate size disc extrusion at L2-L3 causing moderate narrowing. No acute inflammatory process or bowel obstruction. Few scattered colonic diverticula noted. Degenerate changes lumbar spine with findings suspicious for moderate to large sized disc extrusions causing canal narrowing. Please correlate with lower extremity symptomatology. IMPRESSION:     Assessment:  1. Constipation, unspecified constipation type    2. Nausea and vomiting, intractability of vomiting not specified, unspecified vomiting type      Plan:    Spent 30 minutes providing patient education and counseling. Thank you for allowing me to participate in the care of Ms. Vance. For any further questions please do not hesitate to contact me. I independently performed an evaluation on Suraj Tejeda. I have reviewed the above documentation completed by the Nurse Practitioner and agree with the documented findings and plan of care. I have personally evaluated this patient with the APRN and have completed at least one if not all key elements of the E/M (history, physical exam, and MDM). Please see my additional contributions to the HPI, physical exam, assessment, and medical decision making. Discussed with the patient regarding symptoms. Prior to 1 month, patient did not have any GI symptoms.   At present she has loose bowels alternating with constipation. With 100 m she is losing weight. She has about 10 to 15 pound weight loss. She does have anxiety. She has been using marijuana recently. Abdominal examination benign. Labs, imaging studies within normal limits. Patient is explained regarding possible IBS and management. Will try citalopram 10 mg at bedtime for about 4 to 6 weeks and see whether this helps. Also advised to have labs as outlined above. Reevaluate patient in the next 6 weeks  I have reviewed and agree with the MA/LPN ROS.      Alfonso Bolton MD, [facility]  Board Certified in Gastroenterology and [address] [place] [address] Gastroenterology  Office #: [phone]

## 2020-08-24 NOTE — CARE COORDINATION
Patient contacted regarding recent discharge and COVID-19 risk. Discussed COVID-19 related testing which was not done at this time. Test results were not done. Patient informed of results, if available? Test results 2020- negative, patient aware     Care Transition Nurse/ Ambulatory Care Manager contacted the patient by telephone to perform post discharge assessment. Verified name and  with patient as identifiers. Patient has following risk factors of: pre-diabetes. CTN/ACM reviewed discharge instructions, medical action plan and red flags related to discharge diagnosis. Reviewed and educated them on any new and changed medications related to discharge diagnosis. Advised obtaining a 90-day supply of all daily and as-needed medications. Education provided regarding infection prevention, and signs and symptoms of COVID-19 and when to seek medical attention with patient who verbalized understanding. Discussed exposure protocols and quarantine from [address] [place] [address] Hwy you at higher risk for severe illness  and given an opportunity for questions and concerns. The patient agrees to contact the COVID-19 hotline [phone] or PCP office for questions related to their healthcare. CTN/ACM provided contact information for future reference. From CDC: Are you at higher risk for severe illness?  Wash your hands often.  Avoid close contact (6 feet, which is about two arm lengths) with people who are sick.  Put distance between yourself and other people if COVID-19 is spreading in your community.  Clean and disinfect frequently touched surfaces.  Avoid all cruise travel and non-essential air travel.  Call your healthcare professional if you have concerns about COVID-19 and your underlying condition or if you are sick.     For more information on steps you can take to protect yourself, see CDC's How to Protect Yourself    No follow up needed, non Covid ED follow up based on severity of symptoms and risk factors. Patient saw GI today, further testing ordered for patient for abdominal pain, nausea per patient. Patient denies any nausea today.

## 2020-08-26 LAB
SEDIMENTATION RATE, ERYTHROCYTE: 35 MM/H (ref 0–20)
SEDIMENTATION RATE, ERYTHROCYTE: NORMAL
TISSUE TRANSGLUTAMINASE IGA: <1 U/ML
TISSUE TRANSGLUTAMINASE IGA: NORMAL
TSH SERPL DL<=0.05 MIU/L-ACNC: 3.38 UIU/ML (ref 0.49–4.67)
TSH SERPL DL<=0.05 MIU/L-ACNC: NORMAL M[IU]/L

## 2020-08-28 ENCOUNTER — TELEPHONE (OUTPATIENT)
Dept: GASTROENTEROLOGY | Age: 20
End: 2020-08-28

## 2020-08-28 ENCOUNTER — HOSPITAL ENCOUNTER (OUTPATIENT)
Age: 20
Discharge: HOME | End: 2020-08-28
Payer: COMMERCIAL

## 2020-08-28 LAB
GIARDIA ANTIGEN STOOL: NEGATIVE
PANCREATIC ELASTASE, FECAL: >800 UG/G

## 2020-08-28 PROCEDURE — 83013 H PYLORI (C-13) BREATH: CPT

## 2020-08-28 PROCEDURE — 83014 H PYLORI DRUG ADMIN: CPT

## 2020-08-28 RX ORDER — OMEPRAZOLE 20 MG/1
20 CAPSULE, DELAYED RELEASE ORAL
Qty: 90 CAPSULE | Refills: 1 | Status: SHIPPED | OUTPATIENT
Start: 2020-08-28 | End: 2021-05-07

## 2020-08-28 NOTE — TELEPHONE ENCOUNTER
Labs unremarkable. Please advise patient keep follow-up appointment.   Electronically signed by HILDA Miller NP on 8/28/2020 at 4:43 PM

## 2020-08-28 NOTE — TELEPHONE ENCOUNTER
Patient hs been goggling her symptoms and she says all symptoms go to H Pylori.  Everett Aguirre it is OK to order her an H Pylori test.

## 2020-08-28 NOTE — TELEPHONE ENCOUNTER
patient called wanting to know if her test results are back yet . She asked if a nurse could return her call to [phone] with the results .  Thank You

## 2020-08-30 ENCOUNTER — PATIENT MESSAGE (OUTPATIENT)
Dept: GASTROENTEROLOGY | Age: 20
End: 2020-08-30

## 2020-08-30 LAB — H PYLORI BREATH TEST: NEGATIVE

## 2020-08-31 NOTE — TELEPHONE ENCOUNTER
Patient was negative for H pylori but she was goggling that is she was on PPI's and Antibiotics is could mess with it. Most of the time that medication shows false positives but her test was negative. She still says she can't eat because of the nausea and the only thing that helped for the nausea was the shot in the behind they gave her at the hospital. Appointment made for next week with Teddy Barcenas to see her.

## 2020-09-01 ENCOUNTER — PATIENT MESSAGE (OUTPATIENT)
Dept: GASTROENTEROLOGY | Age: 20
End: 2020-09-01

## 2020-09-02 NOTE — TELEPHONE ENCOUNTER
Writer spoke with Krystyna Gilliland, she states that she will put the order in for her EGD today. Writer informed patient that someone will contact her within the week to set this procedure up. Forwarding this to the procedure scheduling pool.

## 2020-09-02 NOTE — TELEPHONE ENCOUNTER
From: Koffi Villa  To: Bhupinder Ventura MD  Sent: 9/1/2020 3:00 PM EDT  Subject: Non-Urgent Medical Question    If possible I want to get a scope of my stomach done to check for ulcers because im throwing up every other day and I cant eat. I also can't wait another month to figure this out.  Im losing 1-2lbs a DAY!!!

## 2020-09-03 ENCOUNTER — HOSPITAL ENCOUNTER (EMERGENCY)
Age: 20
Discharge: HOME | End: 2020-09-03
Attending: EMERGENCY MEDICINE
Payer: COMMERCIAL

## 2020-09-03 VITALS
RESPIRATION RATE: 18 BRPM | BODY MASS INDEX: 40.92 KG/M2 | HEIGHT: 68 IN | HEART RATE: 112 BPM | DIASTOLIC BLOOD PRESSURE: 82 MMHG | SYSTOLIC BLOOD PRESSURE: 127 MMHG | WEIGHT: 270 LBS | TEMPERATURE: 98.6 F | OXYGEN SATURATION: 98 %

## 2020-09-03 LAB
ABSOLUTE EOS #: 0.1 K/UL (ref 0–0.4)
ABSOLUTE IMMATURE GRANULOCYTE: ABNORMAL K/UL (ref 0–0.3)
ABSOLUTE LYMPH #: 2.1 K/UL (ref 1.2–5.2)
ABSOLUTE MONO #: 0.6 K/UL (ref 0.1–1.3)
ALBUMIN SERPL-MCNC: 4.7 G/DL (ref 3.5–5.2)
ALBUMIN/GLOBULIN RATIO: ABNORMAL (ref 1–2.5)
ALP BLD-CCNC: 78 U/L (ref 35–104)
ALT SERPL-CCNC: 399 U/L (ref 5–33)
ANION GAP SERPL CALCULATED.3IONS-SCNC: 19 MMOL/L (ref 9–17)
AST SERPL-CCNC: 144 U/L
BASOPHILS # BLD: 0 % (ref 0–2)
BASOPHILS ABSOLUTE: 0.1 K/UL (ref 0–0.2)
BILIRUB SERPL-MCNC: 0.84 MG/DL (ref 0.3–1.2)
BUN BLDV-MCNC: 10 MG/DL (ref 6–20)
BUN/CREAT BLD: ABNORMAL (ref 9–20)
CALCIUM SERPL-MCNC: 9.3 MG/DL (ref 8.6–10.4)
CHLORIDE BLD-SCNC: 98 MMOL/L (ref 98–107)
CO2: 20 MMOL/L (ref 20–31)
CREAT SERPL-MCNC: 0.63 MG/DL (ref 0.5–0.9)
DIFFERENTIAL TYPE: ABNORMAL
EOSINOPHILS RELATIVE PERCENT: 1 % (ref 0–4)
GFR AFRICAN AMERICAN: >60 ML/MIN
GFR NON-AFRICAN AMERICAN: >60 ML/MIN
GFR SERPL CREATININE-BSD FRML MDRD: ABNORMAL ML/MIN/{1.73_M2}
GFR SERPL CREATININE-BSD FRML MDRD: ABNORMAL ML/MIN/{1.73_M2}
GLUCOSE BLD-MCNC: 77 MG/DL (ref 70–99)
HCG QUALITATIVE: NEGATIVE
HCT VFR BLD CALC: 43.6 % (ref 36–46)
HEMOGLOBIN: 14.9 G/DL (ref 12–16)
IMMATURE GRANULOCYTES: ABNORMAL %
LIPASE: 31 U/L (ref 13–60)
LYMPHOCYTES # BLD: 17 % (ref 25–45)
MAGNESIUM: 2.1 MG/DL (ref 1.6–2.6)
MCH RBC QN AUTO: 30.9 PG (ref 26–34)
MCHC RBC AUTO-ENTMCNC: 34.1 G/DL (ref 31–37)
MCV RBC AUTO: 90.4 FL (ref 80–100)
MONOCYTES # BLD: 5 % (ref 2–8)
NRBC AUTOMATED: ABNORMAL PER 100 WBC
PDW BLD-RTO: 12.8 % (ref 11.5–14.9)
PLATELET # BLD: 299 K/UL (ref 150–450)
PLATELET ESTIMATE: ABNORMAL
PMV BLD AUTO: 10.4 FL (ref 6–12)
POTASSIUM SERPL-SCNC: 3.9 MMOL/L (ref 3.7–5.3)
RBC # BLD: 4.83 M/UL (ref 4–5.2)
RBC # BLD: ABNORMAL 10*6/UL
SEG NEUTROPHILS: 77 % (ref 34–64)
SEGMENTED NEUTROPHILS ABSOLUTE COUNT: 9.7 K/UL (ref 1.3–9.1)
SODIUM BLD-SCNC: 137 MMOL/L (ref 135–144)
TOTAL PROTEIN: 8.2 G/DL (ref 6.4–8.3)
TSH SERPL DL<=0.05 MIU/L-ACNC: 2.22 MIU/L (ref 0.3–5)
WBC # BLD: 12.5 K/UL (ref 4.5–13.5)
WBC # BLD: ABNORMAL 10*3/UL

## 2020-09-03 PROCEDURE — 83690 ASSAY OF LIPASE: CPT

## 2020-09-03 PROCEDURE — 96375 TX/PRO/DX INJ NEW DRUG ADDON: CPT

## 2020-09-03 PROCEDURE — 83735 ASSAY OF MAGNESIUM: CPT

## 2020-09-03 PROCEDURE — 85025 COMPLETE CBC W/AUTO DIFF WBC: CPT

## 2020-09-03 PROCEDURE — 84443 ASSAY THYROID STIM HORMONE: CPT

## 2020-09-03 PROCEDURE — 84703 CHORIONIC GONADOTROPIN ASSAY: CPT

## 2020-09-03 PROCEDURE — 80053 COMPREHEN METABOLIC PANEL: CPT

## 2020-09-03 PROCEDURE — 6360000002 HC RX W HCPCS: Performed by: EMERGENCY MEDICINE

## 2020-09-03 PROCEDURE — 99284 EMERGENCY DEPT VISIT MOD MDM: CPT

## 2020-09-03 PROCEDURE — 96374 THER/PROPH/DIAG INJ IV PUSH: CPT

## 2020-09-03 PROCEDURE — 36415 COLL VENOUS BLD VENIPUNCTURE: CPT

## 2020-09-03 PROCEDURE — 2580000003 HC RX 258: Performed by: EMERGENCY MEDICINE

## 2020-09-03 PROCEDURE — 2500000003 HC RX 250 WO HCPCS: Performed by: EMERGENCY MEDICINE

## 2020-09-03 RX ORDER — 0.9 % SODIUM CHLORIDE 0.9 %
1000 INTRAVENOUS SOLUTION INTRAVENOUS ONCE
Status: COMPLETED | OUTPATIENT
Start: 2020-09-03 | End: 2020-09-03

## 2020-09-03 RX ORDER — ONDANSETRON 2 MG/ML
8 INJECTION INTRAMUSCULAR; INTRAVENOUS ONCE
Status: COMPLETED | OUTPATIENT
Start: 2020-09-03 | End: 2020-09-03

## 2020-09-03 RX ORDER — ONDANSETRON 4 MG/1
4 TABLET, ORALLY DISINTEGRATING ORAL EVERY 8 HOURS PRN
Qty: 30 TABLET | Refills: 0 | Status: ON HOLD | OUTPATIENT
Start: 2020-09-03 | End: 2020-09-08

## 2020-09-03 RX ORDER — METOCLOPRAMIDE 10 MG/1
10 TABLET ORAL 4 TIMES DAILY
Qty: 20 TABLET | Refills: 0 | Status: SHIPPED | OUTPATIENT
Start: 2020-09-03 | End: 2021-02-23

## 2020-09-03 RX ORDER — PROMETHAZINE HYDROCHLORIDE 12.5 MG/1
12.5 TABLET ORAL 4 TIMES DAILY PRN
Qty: 20 TABLET | Refills: 0 | Status: SHIPPED | OUTPATIENT
Start: 2020-09-03 | End: 2020-09-10

## 2020-09-03 RX ADMIN — ONDANSETRON 8 MG: 2 INJECTION INTRAMUSCULAR; INTRAVENOUS at 20:20

## 2020-09-03 RX ADMIN — FAMOTIDINE 20 MG: 10 INJECTION, SOLUTION INTRAVENOUS at 20:20

## 2020-09-03 RX ADMIN — SODIUM CHLORIDE 1000 ML: 9 INJECTION, SOLUTION INTRAVENOUS at 20:20

## 2020-09-03 ASSESSMENT — ENCOUNTER SYMPTOMS
NAUSEA: 1
DIARRHEA: 0
ABDOMINAL PAIN: 0
SORE THROAT: 0
COUGH: 0

## 2020-09-03 ASSESSMENT — PAIN DESCRIPTION - FREQUENCY: FREQUENCY: CONTINUOUS

## 2020-09-03 ASSESSMENT — PAIN SCALES - GENERAL: PAINLEVEL_OUTOF10: 3

## 2020-09-03 ASSESSMENT — PAIN DESCRIPTION - LOCATION: LOCATION: ABDOMEN

## 2020-09-03 ASSESSMENT — PAIN DESCRIPTION - PAIN TYPE: TYPE: ACUTE PAIN

## 2020-09-03 NOTE — TELEPHONE ENCOUNTER
Writer spoke to pt over the phone in regards to scheduling EGD. Pt is sched w/ [facility] at [address] [place] [address] 9/8/20 @ 7:30am proc time, 6am arrival time. Prep instructions given to pt over the phone. Pt instructed she will need a . Covid [facility] is sched [facility] 9/4/20 @ 10:15am.  Pt given address, telephone number and instructed where to report. Pt voices her understanding.

## 2020-09-04 ENCOUNTER — HOSPITAL ENCOUNTER (OUTPATIENT)
Dept: PREADMISSION TESTING | Age: 20
Discharge: HOME | End: 2020-09-08
Payer: COMMERCIAL

## 2020-09-04 PROCEDURE — U0003 INFECTIOUS AGENT DETECTION BY NUCLEIC ACID (DNA OR RNA); SEVERE ACUTE RESPIRATORY SYNDROME CORONAVIRUS 2 (SARS-COV-2) (CORONAVIRUS DISEASE [COVID-19]), AMPLIFIED PROBE TECHNIQUE, MAKING USE OF HIGH THROUGHPUT TECHNOLOGIES AS DESCRIBED BY CMS-2020-01-R: HCPCS

## 2020-09-04 NOTE — ED PROVIDER NOTES
by mouth every morning (before breakfast), Disp-90 capsule,R-1Normal      citalopram (CELEXA) 10 MG tablet Take 1 tablet by mouth daily, Disp-90 tablet,R-1Normal      polyethylene glycol (GLYCOLAX) 17 GM/SCOOP powder Take 17 g by mouth daily as needed (constipation), Disp-510 g,R-0Print      sucralfate (CARAFATE) 1 GM/10ML suspension Take 10 mLs by mouth 4 times daily, Disp-400 mL,R-0Print      dicyclomine (BENTYL) 10 MG capsule Take 1 capsule by mouth every 6 hours as needed (cramps), Disp-20 capsule,R-0Print      pantoprazole (PROTONIX) 20 MG tablet Take 2 tablets by mouth daily, Disp-30 tablet,R-0Print      metFORMIN (GLUCOPHAGE XR) 750 MG extended release tablet Take 1 tablet by mouth daily (with breakfast), Disp-30 tablet, R-3Normal      norethindrone-ethinyl estradiol (OVCON-35, 28,) 0.4-35 MG-MCG per tablet Take 1 tablet by mouth daily, Disp-1 packet, R-3Normal      thyroid (ARMOUR) 60 MG tablet Take 60 mg by mouth dailyHistorical Med      Cholecalciferol (VITAMIN D3) 20587 units CAPS Take 5,000 Units by mouth Twice a WeekHistorical Med           ALLERGIES     has No Known Allergies. FAMILY HISTORY     She indicated that the status of her mother is unknown. She indicated that the status of her father is unknown. SOCIAL HISTORY       Social History     Tobacco Use    Smoking status: Former Smoker     Last attempt to quit: 2020     Years since quittin.5    Smokeless tobacco: Never Used   Substance Use Topics    Alcohol use: No    Drug use: Yes     Types: Marijuana     Comment: 1 once a day     PHYSICAL EXAM     INITIAL VITALS: /82   Pulse 112   Temp 98.6 °F (37 °C)   Resp 18   Ht 5' 8\" (1.727 m)   Wt 270 lb (122.5 kg)   SpO2 98%   BMI 41.05 kg/m²    Physical Exam  Vitals signs reviewed. Constitutional:       General: She is not in acute distress. Appearance: Normal appearance. She is well-developed. She is not diaphoretic. HENT:      Head: Normocephalic and atraumatic. Right Ear: External ear normal.      Left Ear: External ear normal.      Nose: Nose normal. No congestion. Mouth/Throat:      Mouth: Mucous membranes are moist.      Pharynx: Oropharynx is clear. Eyes:      General:         Right eye: No discharge. Left eye: No discharge. Conjunctiva/sclera: Conjunctivae normal.      Pupils: Pupils are equal, round, and reactive to light. Neck:      Musculoskeletal: Normal range of motion and neck supple. Trachea: No tracheal deviation. Cardiovascular:      Rate and Rhythm: Normal rate and regular rhythm. Pulses: Normal pulses. Heart sounds: Normal heart sounds. Pulmonary:      Effort: Pulmonary effort is normal. No respiratory distress. Breath sounds: Normal breath sounds. No stridor. No wheezing or rales. Abdominal:      Palpations: Abdomen is soft. Tenderness: There is no abdominal tenderness. There is no guarding or rebound. Musculoskeletal: Normal range of motion. General: No tenderness or deformity. Skin:     General: Skin is warm and dry. Capillary Refill: Capillary refill takes less than 2 seconds. Findings: No erythema or rash. Neurological:      General: No focal deficit present. Mental Status: She is alert and oriented to person, place, and time. Cranial Nerves: No cranial nerve deficit. Coordination: Coordination normal.   Psychiatric:         Mood and Affect: Mood normal.         Behavior: Behavior normal.         Thought Content: Thought content normal.         Judgment: Judgment normal.         MEDICAL DECISION MAKING:            Procedures    DIAGNOSTIC RESULTS     LABS: All lab results were reviewed by myself, and all abnormals are listed below.   Labs Reviewed   CBC WITH AUTO DIFFERENTIAL - Abnormal; Notable for the following components:       Result Value    Seg Neutrophils 77 (*)     Lymphocytes 17 (*)     Segs Absolute 9.70 (*)     All other components within normal limits   COMPREHENSIVE METABOLIC PANEL - Abnormal; Notable for the following components:    Anion Gap 19 (*)      (*)      (*)     All other components within normal limits   HCG, SERUM, QUALITATIVE   LIPASE   MAGNESIUM   TSH WITH REFLEX       EMERGENCY DEPARTMENTCOURSE:   Reviewed elevated lft with patient, she will fu her GI for outpatient testing  Discussed with patient anticipatory guidance, discharge instructions, follow up PCP 24 hours  rx phenergan, refilled zofran and reglan      Vitals:    Vitals:    09/03/20 1921   BP: 127/82   Pulse: 112   Resp: 18   Temp: 98.6 °F (37 °C)   SpO2: 98%   Weight: 270 lb (122.5 kg)   Height: 5' 8\" (1.727 m)       The patient was given the following medications while in the emergency department:  Orders Placed This Encounter   Medications    0.9 % sodium chloride bolus    ondansetron (ZOFRAN) injection 8 mg    famotidine (PEPCID) injection 20 mg    metoclopramide (REGLAN) 10 MG tablet     Sig: Take 1 tablet by mouth 4 times daily WARNING:  May cause drowsiness. May impair ability to operate vehicles or machinery. Do not use in combination with alcohol. Dispense:  20 tablet     Refill:  0    ondansetron (ZOFRAN ODT) 4 MG disintegrating tablet     Sig: Take 1 tablet by mouth every 8 hours as needed for Nausea or Vomiting     Dispense:  30 tablet     Refill:  0    promethazine (PHENERGAN) 12.5 MG tablet     Sig: Take 1 tablet by mouth 4 times daily as needed for Nausea     Dispense:  20 tablet     Refill:  0     FINAL IMPRESSION      1.  Non-intractable vomiting with nausea, unspecified vomiting type          DISPOSITION/PLAN   DISPOSITION Decision To Discharge 09/03/2020 09:23:05 PM      PATIENT REFERRED TO:  Cookie Lee MD  [address] [facility] [address] [phone]    Schedule an appointment as soon as possible for a visit in 1 day      DISCHARGE MEDICATIONS:  Discharge Medication List as of 9/3/2020  9:24 PM        Anna Alvarez MD  Attending Emergency Physician                    Minh Desai MD  09/03/20 0229

## 2020-09-04 NOTE — ED NOTES
Mode of arrival (squad #, walk in, police, etc) : Pt. Walked into ED        Chief complaint(s): Abdominal pain. Nausea, vomiting        Arrival Note (brief scenario, treatment PTA, etc).: Pt. States she has had nausea, vomiting for 6 weeks. Pt. States she hasn't been able to keep anything down for a couple weeks now. Pt. States she was here 3 times for this. Pt. Said she had abdominal cramping previously but that has went away. Pt. States she has hypothyroidism and she hasn't taken her prescription in a while. Pt. Is alert and oriented resting on cot in room no signs of distress. C= \"Have you ever felt that you should Cut down on your drinking? \"  No  A= \"Have people Annoyed you by criticizing your drinking? \"  No  G= \"Have you ever felt bad or Guilty about your drinking? \"  No  E= \"Have you ever had a drink as an Eye-opener first thing in the morning to steady your nerves or to help a hangover? \"  No      Deferred []      Reason for deferring: N/A    *If yes to two or more: probable alcohol abuse. Amilcar Tavarez RN  09/03/20 2020

## 2020-09-06 LAB — SARS-COV-2, NAA: NOT DETECTED

## 2020-09-08 ENCOUNTER — ANESTHESIA (OUTPATIENT)
Dept: ENDOSCOPY | Age: 20
End: 2020-09-08
Payer: COMMERCIAL

## 2020-09-08 ENCOUNTER — HOSPITAL ENCOUNTER (OUTPATIENT)
Age: 20
Setting detail: OUTPATIENT SURGERY
Discharge: HOME | End: 2020-09-08
Attending: INTERNAL MEDICINE | Admitting: INTERNAL MEDICINE
Payer: COMMERCIAL

## 2020-09-08 ENCOUNTER — ANESTHESIA EVENT (OUTPATIENT)
Dept: ENDOSCOPY | Age: 20
End: 2020-09-08
Payer: COMMERCIAL

## 2020-09-08 VITALS
HEIGHT: 68 IN | DIASTOLIC BLOOD PRESSURE: 71 MMHG | TEMPERATURE: 97.8 F | RESPIRATION RATE: 14 BRPM | SYSTOLIC BLOOD PRESSURE: 113 MMHG | HEART RATE: 75 BPM | OXYGEN SATURATION: 97 % | WEIGHT: 269 LBS | BODY MASS INDEX: 40.77 KG/M2

## 2020-09-08 VITALS — DIASTOLIC BLOOD PRESSURE: 89 MMHG | OXYGEN SATURATION: 97 % | SYSTOLIC BLOOD PRESSURE: 135 MMHG

## 2020-09-08 LAB
-: NORMAL
HCG, PREGNANCY URINE (POC): NEGATIVE

## 2020-09-08 PROCEDURE — 2580000003 HC RX 258: Performed by: INTERNAL MEDICINE

## 2020-09-08 PROCEDURE — 2709999900 HC NON-CHARGEABLE SUPPLY: Performed by: INTERNAL MEDICINE

## 2020-09-08 PROCEDURE — 2500000003 HC RX 250 WO HCPCS: Performed by: NURSE ANESTHETIST, CERTIFIED REGISTERED

## 2020-09-08 PROCEDURE — 6360000002 HC RX W HCPCS: Performed by: NURSE ANESTHETIST, CERTIFIED REGISTERED

## 2020-09-08 PROCEDURE — 7100000000 HC PACU RECOVERY - FIRST 15 MIN: Performed by: INTERNAL MEDICINE

## 2020-09-08 PROCEDURE — 81025 URINE PREGNANCY TEST: CPT

## 2020-09-08 PROCEDURE — 3609012400 HC EGD TRANSORAL BIOPSY SINGLE/MULTIPLE: Performed by: INTERNAL MEDICINE

## 2020-09-08 PROCEDURE — 2500000003 HC RX 250 WO HCPCS: Performed by: ANESTHESIOLOGY

## 2020-09-08 PROCEDURE — 43239 EGD BIOPSY SINGLE/MULTIPLE: CPT | Performed by: INTERNAL MEDICINE

## 2020-09-08 PROCEDURE — 88305 TISSUE EXAM BY PATHOLOGIST: CPT

## 2020-09-08 PROCEDURE — 7100000001 HC PACU RECOVERY - ADDTL 15 MIN: Performed by: INTERNAL MEDICINE

## 2020-09-08 PROCEDURE — 3700000001 HC ADD 15 MINUTES (ANESTHESIA): Performed by: INTERNAL MEDICINE

## 2020-09-08 PROCEDURE — 3700000000 HC ANESTHESIA ATTENDED CARE: Performed by: INTERNAL MEDICINE

## 2020-09-08 RX ORDER — OXYCODONE HYDROCHLORIDE AND ACETAMINOPHEN 5; 325 MG/1; MG/1
1 TABLET ORAL PRN
Status: DISCONTINUED | OUTPATIENT
Start: 2020-09-08 | End: 2020-09-08 | Stop reason: HOSPADM

## 2020-09-08 RX ORDER — ONDANSETRON 2 MG/ML
4 INJECTION INTRAMUSCULAR; INTRAVENOUS
Status: DISCONTINUED | OUTPATIENT
Start: 2020-09-08 | End: 2020-09-08 | Stop reason: HOSPADM

## 2020-09-08 RX ORDER — LABETALOL HYDROCHLORIDE 5 MG/ML
5 INJECTION, SOLUTION INTRAVENOUS EVERY 10 MIN PRN
Status: DISCONTINUED | OUTPATIENT
Start: 2020-09-08 | End: 2020-09-08 | Stop reason: HOSPADM

## 2020-09-08 RX ORDER — MIDAZOLAM HYDROCHLORIDE 1 MG/ML
INJECTION INTRAMUSCULAR; INTRAVENOUS PRN
Status: DISCONTINUED | OUTPATIENT
Start: 2020-09-08 | End: 2020-09-08 | Stop reason: SDUPTHER

## 2020-09-08 RX ORDER — DIPHENHYDRAMINE HYDROCHLORIDE 50 MG/ML
12.5 INJECTION INTRAMUSCULAR; INTRAVENOUS
Status: DISCONTINUED | OUTPATIENT
Start: 2020-09-08 | End: 2020-09-08 | Stop reason: HOSPADM

## 2020-09-08 RX ORDER — SODIUM CHLORIDE 9 MG/ML
INJECTION, SOLUTION INTRAVENOUS CONTINUOUS
Status: DISCONTINUED | OUTPATIENT
Start: 2020-09-08 | End: 2020-09-08 | Stop reason: HOSPADM

## 2020-09-08 RX ORDER — OXYCODONE HYDROCHLORIDE AND ACETAMINOPHEN 5; 325 MG/1; MG/1
2 TABLET ORAL PRN
Status: DISCONTINUED | OUTPATIENT
Start: 2020-09-08 | End: 2020-09-08 | Stop reason: HOSPADM

## 2020-09-08 RX ORDER — PROPOFOL 10 MG/ML
INJECTION, EMULSION INTRAVENOUS PRN
Status: DISCONTINUED | OUTPATIENT
Start: 2020-09-08 | End: 2020-09-08 | Stop reason: SDUPTHER

## 2020-09-08 RX ORDER — LIDOCAINE HYDROCHLORIDE 20 MG/ML
INJECTION, SOLUTION EPIDURAL; INFILTRATION; INTRACAUDAL; PERINEURAL PRN
Status: DISCONTINUED | OUTPATIENT
Start: 2020-09-08 | End: 2020-09-08 | Stop reason: SDUPTHER

## 2020-09-08 RX ADMIN — FAMOTIDINE 20 MG: 10 INJECTION, SOLUTION INTRAVENOUS at 07:17

## 2020-09-08 RX ADMIN — PROPOFOL 50 MG: 10 INJECTION, EMULSION INTRAVENOUS at 07:46

## 2020-09-08 RX ADMIN — PROPOFOL 30 MG: 10 INJECTION, EMULSION INTRAVENOUS at 07:49

## 2020-09-08 RX ADMIN — LIDOCAINE HYDROCHLORIDE 100 MG: 20 INJECTION, SOLUTION EPIDURAL; INFILTRATION; INTRACAUDAL at 07:44

## 2020-09-08 RX ADMIN — PROPOFOL 50 MG: 10 INJECTION, EMULSION INTRAVENOUS at 07:44

## 2020-09-08 RX ADMIN — SODIUM CHLORIDE: 9 INJECTION, SOLUTION INTRAVENOUS at 07:16

## 2020-09-08 RX ADMIN — PROPOFOL 50 MG: 10 INJECTION, EMULSION INTRAVENOUS at 07:45

## 2020-09-08 RX ADMIN — PROPOFOL 30 MG: 10 INJECTION, EMULSION INTRAVENOUS at 07:47

## 2020-09-08 RX ADMIN — MIDAZOLAM 2 MG: 1 INJECTION INTRAMUSCULAR; INTRAVENOUS at 07:37

## 2020-09-08 ASSESSMENT — PULMONARY FUNCTION TESTS
PIF_VALUE: 0
PIF_VALUE: 0
PIF_VALUE: 1
PIF_VALUE: 0
PIF_VALUE: 1
PIF_VALUE: 0
PIF_VALUE: 1
PIF_VALUE: 0
PIF_VALUE: 1
PIF_VALUE: 0
PIF_VALUE: 1
PIF_VALUE: 0
PIF_VALUE: 0
PIF_VALUE: 1
PIF_VALUE: 0
PIF_VALUE: 0
PIF_VALUE: 1
PIF_VALUE: 0
PIF_VALUE: 1
PIF_VALUE: 0
PIF_VALUE: 1
PIF_VALUE: 0
PIF_VALUE: 1

## 2020-09-08 ASSESSMENT — PAIN SCALES - GENERAL: PAINLEVEL_OUTOF10: 0

## 2020-09-08 ASSESSMENT — PAIN - FUNCTIONAL ASSESSMENT: PAIN_FUNCTIONAL_ASSESSMENT: 0-10

## 2020-09-08 NOTE — OP NOTE
ESOPHAGOGASTRODUODENOSCOPY   ( EGD )  DATE OF PROCEDURE: 9/8/2020     SURGEON: Talia Arias MD    ASSISTANT: None    PREOPERATIVE DIAGNOSIS: Postprandial nausea vomiting. Procedure performed to evaluate upper GI lesions. Patient has significant weight loss. POSTOPERATIVE DIAGNOSIS: No lesions seen up to end of second part of the duodenum that can account for patient's symptoms    OPERATION: Upper GI endoscopy with Biopsy    ANESTHESIA: MAC    ESTIMATED BLOOD LOSS: None    COMPLICATIONS: None. SPECIMENS:  Was Obtained: Random biopsies from the body and antrum to evaluate Helicobacter pylori    HISTORY: The patient is a 21y.o. year old female with history of above preop diagnosis. I recommended esophagogastroduodenoscopy with possible biopsy and I explained the risk, benefits, expected outcome, and alternatives to the procedure. Risks included but are not limited to bleeding, infection, respiratory distress, hypotension, and perforation of the esophagus, stomach, or duodenum. Patient understands and is in agreement. PROCEDURE: The patient was given IV conscious sedation. The patient's SPO2 remained above 90% throughout the procedure. Cetacaine spray given. Patient placed in left lateral position. Olympus  videogastroscope was inserted orally under vision into the esophagus without difficulty and advanced into the stomach then through the pylorus up to the second part of duodenum. Findings:    Retropharyngeal area was grossly normal appearing    Esophagus: normal.  No signs of peptic esophagitis or hiatal hernia. Squamocolumnar junction is at about 35 cm and lower esophageal sphincter opens normally.     Stomach:    Fundus and Cardia Examined in Retroflexed View: normal    Body: normal    Antrum: normal  Random biopsies taken from the body and antrum to evaluate for H. pylori  Duodenum:     Descending: normal    Bulb: normal    While withdrawing the scope the above findings were verified and the scope was removed. The patient has tolerated the procedure without unusual events. Recommendations/Plan:   1. F/U Biopsies  2. F/U In Office as instructed  3.  Discussed with the family                   Electronically signed by Sandra Hull MD  on 9/8/2020 at 7:55 AM

## 2020-09-08 NOTE — H&P
History    None   Social Needs    Financial resource strain: None    Food insecurity     Worry: None     Inability: None    Transportation needs     Medical: None     Non-medical: None   Tobacco Use    Smoking status: Former Smoker     Last attempt to quit: 2020     Years since quittin.5    Smokeless tobacco: Never Used   Substance and Sexual Activity    Alcohol use: No    Drug use: Not Currently     Types: Marijuana     Comment: last time     Sexual activity: Yes     Partners: Male   Lifestyle    Physical activity     Days per week: None     Minutes per session: None    Stress: None   Relationships    Social connections     Talks on phone: None     Gets together: None     Attends Caodaism service: None     Active member of club or organization: None     Attends meetings of clubs or organizations: None     Relationship status: None    Intimate partner violence     Fear of current or ex partner: None     Emotionally abused: None     Physically abused: None     Forced sexual activity: None   Other Topics Concern    None   Social History Narrative    None           REVIEW OF SYSTEMS      No Known Allergies    No current facility-administered medications on file prior to encounter. Current Outpatient Medications on File Prior to Encounter   Medication Sig Dispense Refill    metoclopramide (REGLAN) 10 MG tablet Take 1 tablet by mouth 4 times daily WARNING:  May cause drowsiness. May impair ability to operate vehicles or machinery. Do not use in combination with alcohol.  20 tablet 0    omeprazole (PRILOSEC) 20 MG delayed release capsule Take 1 capsule by mouth every morning (before breakfast) 90 capsule 1    polyethylene glycol (GLYCOLAX) 17 GM/SCOOP powder Take 17 g by mouth daily as needed (constipation) 510 g 0    dicyclomine (BENTYL) 10 MG capsule Take 1 capsule by mouth every 6 hours as needed (cramps) 20 capsule 0    metFORMIN (GLUCOPHAGE XR) 750 MG extended release tablet Take 1 tablet by mouth daily (with breakfast) 30 tablet 3    norethindrone-ethinyl estradiol (OVCON-35, 28,) 0.4-35 MG-MCG per tablet Take 1 tablet by mouth daily 1 packet 3    promethazine (PHENERGAN) 12.5 MG tablet Take 1 tablet by mouth 4 times daily as needed for Nausea 20 tablet 0    sucralfate (CARAFATE) 1 GM/10ML suspension Take 10 mLs by mouth 4 times daily (Patient not taking: Reported on 8/24/2020) 400 mL 0    pantoprazole (PROTONIX) 20 MG tablet Take 2 tablets by mouth daily (Patient not taking: Reported on 8/24/2020) 30 tablet 0    thyroid (ARMOUR) 60 MG tablet Take 60 mg by mouth daily      Cholecalciferol (VITAMIN D3) 96842 units CAPS Take 5,000 Units by mouth Twice a Week         Negative except for what is mentioned in the HPI. GENERAL PHYSICAL EXAM     Vitals: BP (!) 141/80   Pulse 100   Temp 98 °F (36.7 °C) (Infrared)   Resp 16   Ht 5' 8\" (1.727 m)   Wt 269 lb (122 kg)   SpO2 99%   BMI 40.90 kg/m²  Body mass index is 40.9 kg/m². GENERAL APPEARANCE:   Staci Morel is 21 y.o.,  female, moderately obese, nourished, conscious, alert. Does not appear to be distress or pain at this time. SKIN:  Warm, dry, no cyanosis or jaundice. HEAD:  Normocephalic, atraumatic, no swelling or tenderness. EYES:  Pupils equal, reactive to light. EARS:  No discharge, no marked hearing loss. NOSE:  No rhinorrhea, epistaxis or septal deformity. THROAT:  Not congested. No ulceration bleeding or discharge. NECK:  No stiffness, trachea central.  No palpable masses or L.N.                 CHEST:  Symmetrical and equal on expansion. HEART:  RRR S1 > S2. No audible murmurs or gallops. LUNGS:  Equal on expansion, normal breath sounds. No adventitious sounds. ABDOMEN:  Obese. Soft on palpation. No dysphagia, No localized tenderness.   No guarding or rigidity. No palpable hepatosplenomegaly. LYMPHATICS:  No palpable cervical lymphadenopathy. LOCOMOTOR, BACK AND SPINE:  No tenderness or deformities. EXTREMITIES:  Symmetrical, no pretibial edema. Crows sign negative. No discoloration or ulcerations. NEUROLOGIC:  The patient is conscious, alert, oriented,Cranial nerve II-XII intact, taste and smell were not examined. No apparent focal sensory or motor deficits.              PROVISIONAL DIAGNOSES / SURGERY:      NAUSEA AND INTRACTABILITY VOMITING    EGD ESOPHAGOGASTRODUODENOSCOPY     Patient Active Problem List    Diagnosis Date Noted    Irritable bowel syndrome with both constipation and diarrhea 08/14/2020    PCOS (polycystic ovarian syndrome) 04/03/2020    Amenorrhea 03/04/2020    Pelvic pain in female 03/04/2020           HILDA Stone - CNP on 9/8/2020 at 7:02 AM

## 2020-09-08 NOTE — ANESTHESIA PRE PROCEDURE
Department of Anesthesiology  Preprocedure Note       Name:  Marika Mills   Age:  21 y.o.  :  2000                                          MRN:  [id number]         Date:  2020      Surgeon: Joann Randhawa):  King Gibson MD    Procedure: Procedure(s):  EGD ESOPHAGOGASTRODUODENOSCOPY    Medications prior to admission:   Prior to Admission medications    Medication Sig Start Date End Date Taking? Authorizing Provider   metoclopramide (REGLAN) 10 MG tablet Take 1 tablet by mouth 4 times daily WARNING:  May cause drowsiness. May impair ability to operate vehicles or machinery. Do not use in combination with alcohol.  9/3/20  Yes Nikki Peña MD   omeprazole (PRILOSEC) 20 MG delayed release capsule Take 1 capsule by mouth every morning (before breakfast) 20  Yes HILDA Todd - NP   polyethylene glycol [facility]) 17 GM/SCOOP powder Take 17 g by mouth daily as needed (constipation) 20 Yes Pepe Springer MD   dicyclomine (BENTYL) 10 MG capsule Take 1 capsule by mouth every 6 hours as needed (cramps) 20  Yes Lan Lan MD   metFORMIN (GLUCOPHAGE XR) 750 MG extended release tablet Take 1 tablet by mouth daily (with breakfast) 20  Yes HILDA Nieto CNP   norethindrone-ethinyl estradiol (OVCON-35, 28,) 0.4-35 MG-MCG per tablet Take 1 tablet by mouth daily 20  Yes HILDA Nieto CNP   promethazine (PHENERGAN) 12.5 MG tablet Take 1 tablet by mouth 4 times daily as needed for Nausea 9/3/20 9/10/20  Nikki Peña MD   sucralfate (CARAFATE) 1 GM/10ML suspension Take 10 mLs by mouth 4 times daily  Patient not taking: Reported on 2020 8/3/20   Lan Lan MD   pantoprazole (PROTONIX) 20 MG tablet Take 2 tablets by mouth daily  Patient not taking: Reported on 2020   Lan Lan MD   thyroid (ARMOUR) 60 MG tablet Take 60 mg by mouth daily    Historical Provider, MD   Cholecalciferol (VITAMIN D3) 86567 units CAPS Take 5,000 Induction: intravenous. MIPS: Postoperative opioids intended and Prophylactic antiemetics administered. Anesthetic plan and risks discussed with patient. Plan discussed with CRNA.                   Carline Closs, MD   9/8/2020

## 2020-09-08 NOTE — ANESTHESIA POSTPROCEDURE EVALUATION
Department of Anesthesiology  Postprocedure Note    Patient: Samina Rater  MRN: [id number]  YOB: 2000  Date of evaluation: 9/8/2020  Time:  11:20 AM     Procedure Summary     Date:  09/08/20 Room / Location:  [address] [place] [address] ENDO 02 / [address] [facility] ENDO    Anesthesia Start:  4582 Anesthesia Stop:  2862    Procedure:  EGD BIOPSY AND PHOTOS (N/A Esophagus) Diagnosis:       (NAUSEA VOMITING INTRACTABILITY VOMITING)      (PAT ON ADMIT PER ANES)    Surgeon:  William Pérez MD Responsible Provider:  Yonatan Cespedes MD    Anesthesia Type:  MAC ASA Status:  3          Anesthesia Type: MAC    Jay Phase I: Jay Score: 10    Jay Phase II:      Last vitals: Reviewed and per EMR flowsheets.        Anesthesia Post Evaluation    Comments: POST- ANESTHESIA EVALUATION       Pt Name: Samina Rater  MRN: [id number]  YOB: 2000  Date of evaluation: 9/8/2020  Time:  11:20 AM      /71   Pulse 75   Temp 97.8 °F (36.6 °C)   Resp 14   Ht 5' 8\" (1.727 m)   Wt 269 lb (122 kg)   SpO2 97%   BMI 40.90 kg/m²      Consciousness Level  Awake  Cardiopulmonary Status  Stable  Pain Adequately Treated YES  Nausea / Vomiting  NO  Adequate Hydration  YES  Anesthesia Related Complications NONE      Electronically signed by Yonatan Cespedes MD on 9/8/2020 at 11:20 AM

## 2020-09-11 LAB — SURGICAL PATHOLOGY REPORT: NORMAL

## 2020-10-02 ENCOUNTER — TELEPHONE (OUTPATIENT)
Dept: INTERNAL MEDICINE CLINIC | Age: 20
End: 2020-10-02

## 2020-10-02 NOTE — TELEPHONE ENCOUNTER
Attempted to reach pt by phone regarding pts 1st new pt no show ezra w/ Dr. Claudio Isidro on 10/01/20. No answer, voice mail box full, unable to lm. No show ltr mailed.

## 2020-10-06 ENCOUNTER — OFFICE VISIT (OUTPATIENT)
Dept: GASTROENTEROLOGY | Age: 20
End: 2020-10-06
Payer: COMMERCIAL

## 2020-10-06 VITALS
BODY MASS INDEX: 41.59 KG/M2 | WEIGHT: 274.4 LBS | TEMPERATURE: 97.2 F | OXYGEN SATURATION: 97 % | HEIGHT: 68 IN | HEART RATE: 86 BPM

## 2020-10-06 PROCEDURE — 99214 OFFICE O/P EST MOD 30 MIN: CPT | Performed by: NURSE PRACTITIONER

## 2020-10-06 ASSESSMENT — ENCOUNTER SYMPTOMS
COUGH: 0
DIARRHEA: 0
WHEEZING: 0
SINUS PRESSURE: 0
NAUSEA: 1
ABDOMINAL DISTENTION: 0
ANAL BLEEDING: 0
ABDOMINAL PAIN: 0
SORE THROAT: 0
CONSTIPATION: 0
CHOKING: 0
BACK PAIN: 1
RECTAL PAIN: 0
TROUBLE SWALLOWING: 0
VOMITING: 0
BLOOD IN STOOL: 0
VOICE CHANGE: 0

## 2020-10-06 NOTE — PROGRESS NOTES
GI CLINIC FOLLOW UP    INTERVAL HISTORY:   No referring provider defined for this encounter. Chief Complaint   Patient presents with    Follow-up     Patient is here today for a f/u on EGD           HISTORY OF PRESENT ILLNESS:     Patient being seen for follow-up EGD for nausea, vomiting. EGD unremarkable. Biopsies obtained from the stomach to rule out H. Pylori; no histological evidence of H. Pylori. At present patient reports improvement of her symptoms. Patient has history of chronic marijuana use and stopped about 3 weeks ago. Since then her symptoms have improved. She has no longer having emesis. Has mild nausea. No weight loss, fevers, chills. Has good appetite. No dysphasia, odynophagia. No dyspeptic symptoms. Bowel movements are satisfactory. No melena, hematochezia. Patient was in ER 9/3/2020 for nausea, vomiting. Found to have an elevated liver enzymes. , .  2 weeks prior her ALT was 67, AST 30. Patient denies any right upper quadrant pain. Patient states that she was told she had a fatty liver in the past.  She had CT abdomen and pelvis 8/21/2020, markable. Patient does drain times. Stop smoking marijuana about weeks ago. Past Medical,Family, and Social History reviewed and does contribute to the patient presentingcondition. Patient's PMH/PSH,SH,PSYCH Hx, MEDs, ALLERGIES, and ROS were all reviewed and updated in the appropriate sections.     PAST MEDICAL HISTORY:  Past Medical History:   Diagnosis Date    PCOS (polycystic ovarian syndrome)     Pre-diabetes     Thyroid disease        Past Surgical History:   Procedure Laterality Date    TONSILLECTOMY AND ADENOIDECTOMY  06/2016    UPPER GASTROINTESTINAL ENDOSCOPY N/A 9/8/2020    EGD BIOPSY AND PHOTOS performed by Melecio Owens MD at [address]:    Current Outpatient Medications:     metFORMIN (GLUCOPHAGE XR) 750 MG extended release tablet, Take 1 tablet by mouth daily (with breakfast), Disp: 30 tablet, Rfl: 3    norethindrone-ethinyl estradiol (OVCON-35, 28,) 0.4-35 MG-MCG per tablet, Take 1 tablet by mouth daily, Disp: 1 packet, Rfl: 3    metoclopramide (REGLAN) 10 MG tablet, Take 1 tablet by mouth 4 times daily WARNING:  May cause drowsiness. May impair ability to operate vehicles or machinery. Do not use in combination with alcohol.  (Patient not taking: Reported on 10/6/2020), Disp: 20 tablet, Rfl: 0    omeprazole (PRILOSEC) 20 MG delayed release capsule, Take 1 capsule by mouth every morning (before breakfast) (Patient not taking: Reported on 10/6/2020), Disp: 90 capsule, Rfl: 1    sucralfate (CARAFATE) 1 GM/10ML suspension, Take 10 mLs by mouth 4 times daily (Patient not taking: Reported on 8/24/2020), Disp: 400 mL, Rfl: 0    dicyclomine (BENTYL) 10 MG capsule, Take 1 capsule by mouth every 6 hours as needed (cramps) (Patient not taking: Reported on 10/6/2020), Disp: 20 capsule, Rfl: 0    pantoprazole (PROTONIX) 20 MG tablet, Take 2 tablets by mouth daily (Patient not taking: Reported on 8/24/2020), Disp: 30 tablet, Rfl: 0    thyroid (ARMOUR) 60 MG tablet, Take 60 mg by mouth daily, Disp: , Rfl:     Cholecalciferol (VITAMIN D3) 02230 units CAPS, Take 5,000 Units by mouth Twice a Week, Disp: , Rfl:     ALLERGIES:   No Known Allergies    FAMILY HISTORY:       Problem Relation Age of Onset    Diabetes Father     Sleep Apnea Father     No Known Problems Mother          SOCIAL HISTORY:   Social History     Socioeconomic History    Marital status: Single     Spouse name: Not on file    Number of children: Not on file    Years of education: Not on file    Highest education level: Not on file   Occupational History    Not on file   Social Needs    Financial resource strain: Not on file    Food insecurity     Worry: Not on file     Inability: Not on file    Transportation needs     Medical: Not on file     Non-medical: Not on file   Tobacco Use    Smoking status: Former Smoker     Last attempt to quit: 2020     Years since quittin.6    Smokeless tobacco: Never Used   Substance and Sexual Activity    Alcohol use: No    Drug use: Not Currently     Types: Marijuana     Comment: last time     Sexual activity: Yes     Partners: Male   Lifestyle    Physical activity     Days per week: Not on file     Minutes per session: Not on file    Stress: Not on file   Relationships    Social connections     Talks on phone: Not on file     Gets together: Not on file     Attends Gnosticist service: Not on file     Active member of club or organization: Not on file     Attends meetings of clubs or organizations: Not on file     Relationship status: Not on file    Intimate partner violence     Fear of current or ex partner: Not on file     Emotionally abused: Not on file     Physically abused: Not on file     Forced sexual activity: Not on file   Other Topics Concern    Not on file   Social History Narrative    Not on file       REVIEW OF SYSTEMS: A 12-point review of systemswas obtained and pertinent positives and negatives were enumerated above in the history of present illness. All other reviewed systems / symptoms were negative. Review of Systems   Constitutional: Negative for appetite change (decrease), fatigue and unexpected weight change. HENT: Negative for dental problem, postnasal drip, sinus pressure, sore throat, trouble swallowing and voice change. Eyes: Positive for visual disturbance (glasses). Respiratory: Negative for cough, choking and wheezing. Cardiovascular: Negative for chest pain, palpitations and leg swelling. Gastrointestinal: Positive for nausea. Negative for abdominal distention, abdominal pain, anal bleeding, blood in stool, constipation, diarrhea, rectal pain and vomiting. Genitourinary: Negative for difficulty urinating. Musculoskeletal: Positive for back pain.  Negative for arthralgias, gait problem and myalgias. Allergic/Immunologic: Positive for environmental allergies (seasonal). Negative for food allergies. Neurological: Positive for weakness. Negative for dizziness, light-headedness, numbness and headaches. Hematological: Does not bruise/bleed easily. Psychiatric/Behavioral: Negative for sleep disturbance. The patient is nervous/anxious. PHYSICAL EXAMINATION: Vital signs reviewed per the nursing documentation. Pulse 86   Temp 97.2 °F (36.2 °C)   Ht 5' 8\" (1.727 m)   Wt 274 lb 6.4 oz (124.5 kg)   SpO2 97%   BMI 41.72 kg/m²   Body mass index is 41.72 kg/m². Physical Exam  Vitals signs and nursing note reviewed. Constitutional:       Appearance: She is well-developed. She is obese. HENT:      Head: Normocephalic and atraumatic. Eyes:      General: No scleral icterus. Conjunctiva/sclera: Conjunctivae normal.      Pupils: Pupils are equal, round, and reactive to light. Neck:      Musculoskeletal: Normal range of motion and neck supple. Thyroid: No thyromegaly. Vascular: No hepatojugular reflux or JVD. Trachea: No tracheal deviation. Cardiovascular:      Rate and Rhythm: Normal rate and regular rhythm. Heart sounds: Normal heart sounds. Pulmonary:      Effort: Pulmonary effort is normal. No respiratory distress. Breath sounds: Normal breath sounds. No wheezing or rales. Abdominal:      General: Bowel sounds are normal. There is no distension. Palpations: Abdomen is soft. There is no hepatomegaly or mass. Tenderness: There is no abdominal tenderness. There is no rebound. Hernia: No hernia is present. Musculoskeletal:         General: No tenderness. Comments: No joint swelling   Lymphadenopathy:      Cervical: No cervical adenopathy. Skin:     General: Skin is warm. Findings: No bruising, ecchymosis, erythema or rash. Neurological:      Mental Status: She is alert and oriented to person, place, and time.       Cranial Nerves: No cranial nerve deficit. Psychiatric:         Thought Content: Thought content normal.           LABORATORY DATA: Reviewed  Lab Results   Component Value Date    WBC 12.5 09/03/2020    HGB 14.9 09/03/2020    HCT 43.6 09/03/2020    MCV 90.4 09/03/2020     09/03/2020     09/03/2020    K 3.9 09/03/2020    CL 98 09/03/2020    CO2 20 09/03/2020    BUN 10 09/03/2020    CREATININE 0.63 09/03/2020    LABALBU 4.7 09/03/2020    BILITOT 0.84 09/03/2020    ALKPHOS 78 09/03/2020     (H) 09/03/2020     (H) 09/03/2020         Lab Results   Component Value Date    RBC 4.83 09/03/2020    HGB 14.9 09/03/2020    MCV 90.4 09/03/2020    MCH 30.9 09/03/2020    MCHC 34.1 09/03/2020    RDW 12.8 09/03/2020    MPV 10.4 09/03/2020    BASOPCT 0 09/03/2020    LYMPHSABS 2.10 09/03/2020    MONOSABS 0.60 09/03/2020    NEUTROABS 9.70 (H) 09/03/2020    EOSABS 0.10 09/03/2020    BASOSABS 0.10 09/03/2020         DIAGNOSTIC TESTING:     No results found. IMPRESSION: Ms. Rea Patel is a 21 y.o. female with    Diagnosis Orders   1. Elevated LFTs  Hepatitis Panel, Acute    Hepatic Function Panel    US LIVER     At present patient appears stable. Has mild nausea, no emesis. 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 Strongly urged to continue to abstain from marijuana use. Advised to cut down on alcohol consumption. Patient had elevated LFTs during last ED visit. Repeat LFTs, check acute hepatitis panel, ultrasound of the liver. Follow-up 6 weeks. Thank you for allowing me to participate in the care of Ms. Vance. For any further questions please do not hesitate to contact me. I have reviewed and agree with the ROS entered by the MA/BRIGHTN.          NICOLAS Brandt    [facility]  Office #: [phone]

## 2020-11-16 ENCOUNTER — TELEPHONE (OUTPATIENT)
Dept: GASTROENTEROLOGY | Age: 20
End: 2020-11-16

## 2020-11-16 NOTE — TELEPHONE ENCOUNTER
Left message for pt to call office. Office is trying to change appt for tomorrow 11/17 to a Virtual phone visit.

## 2020-11-17 ENCOUNTER — TELEMEDICINE (OUTPATIENT)
Dept: GASTROENTEROLOGY | Age: 20
End: 2020-11-17
Payer: COMMERCIAL

## 2020-11-17 PROCEDURE — 99213 OFFICE O/P EST LOW 20 MIN: CPT | Performed by: NURSE PRACTITIONER

## 2020-11-17 ASSESSMENT — ENCOUNTER SYMPTOMS: CONSTIPATION: 1

## 2020-11-17 NOTE — PROGRESS NOTES
2020    TELEHEALTH EVALUATION -- Audio/Visual (During W[facility]VI-12 public health emergency)    HPI:    David Najera (:  2000) has requested an audio/video evaluation for the following concern(s): This is a MyChart visit. Patient was recently seen in ER for nausea and found to have elevated liver enzymes. , . Her liver enzymes normal the month before. Patient was to have repeat LFTs, acute liver battery but unfortunately she did not follow-up on this. At present patient reports resolution of her nausea, vomiting. Stopped smoking marijuana. Thought to have cyclic vomiting due to marijuana use. Denies abdominal pain. Bowel movements satisfactory. Has good appetite. No weight loss, fevers, chills. Review of Systems   Gastrointestinal: Positive for constipation. All other systems reviewed and are negative. Prior to Visit Medications    Medication Sig Taking? Authorizing Provider   metoclopramide (REGLAN) 10 MG tablet Take 1 tablet by mouth 4 times daily WARNING:  May cause drowsiness. May impair ability to operate vehicles or machinery. Do not use in combination with alcohol.   Patient not taking: Reported on 10/6/2020  Gideon Law MD   omeprazole (PRILOSEC) 20 MG delayed release capsule Take 1 capsule by mouth every morning (before breakfast)  Patient not taking: Reported on 10/6/2020  HILDA Gonzalez NP   sucralfate (CARAFATE) 1 GM/10ML suspension Take 10 mLs by mouth 4 times daily  Patient not taking: Reported on 2020  Pam Moses MD   dicyclomine (BENTYL) 10 MG capsule Take 1 capsule by mouth every 6 hours as needed (cramps)  Patient not taking: Reported on 10/6/2020  Pam Moses MD   pantoprazole (PROTONIX) 20 MG tablet Take 2 tablets by mouth daily  Patient not taking: Reported on 2020  Pam Moses MD   metFORMIN (GLUCOPHAGE XR) 750 MG extended release tablet Take 1 tablet by mouth daily (with breakfast)  HILDA Aguilar - CNP   norethindrone-ethinyl estradiol (OVCON-35, 28,) 0.4-35 MG-MCG per tablet Take 1 tablet by mouth daily  Crystalmanuelito DeckerHILDA - CNP   thyroid (ARMOUR) 60 MG tablet Take 60 mg by mouth daily  Historical Provider, MD   Cholecalciferol (VITAMIN D3) 37126 units CAPS Take 5,000 Units by mouth Twice a Week  Historical Provider, MD       Social History     Tobacco Use    Smoking status: Former Smoker     Last attempt to quit: 2020     Years since quittin.7    Smokeless tobacco: Never Used   Substance Use Topics    Alcohol use: No    Drug use: Not Currently     Types: Marijuana     Comment: last time         PHYSICAL EXAMINATION:    Constitutional: [x] Appears well-developed and well-nourished [] No apparent distress      [] Abnormal-   Mental status  [x] Alert and awake  [x] Oriented to person/place/time []Able to follow commands      Eyes:  EOM    [x]  Normal  [] Abnormal-  Sclera  [x]  Normal  [] Abnormal -         Discharge [x]  None visible  [] Abnormal -    HENT:   [x] Normocephalic, atraumatic. [] Abnormal   [x] Mouth/Throat: Mucous membranes are moist.     External Ears [x] Normal  [] Abnormal-     Neck: [x] No visualized mass     Pulmonary/Chest: [x] Respiratory effort normal.  [x] No visualized signs of difficulty breathing or respiratory distress        [] Abnormal-      Musculoskeletal:   [x] Normal gait with no signs of ataxia         [x] Normal range of motion of neck        [] Abnormal-       Neurological:        [x] No Facial Asymmetry (Cranial nerve 7 motor function) (limited exam to video visit)          [] No gaze palsy        [] Abnormal-         Skin:        [x] No significant exanthematous lesions or discoloration noted on facial skin         [] Abnormal-            Psychiatric:       [x] Normal Affect [x] No Hallucinations        [] Abnormal-     Other pertinent observable physical exam findings-     ASSESSMENT/PLAN:   Diagnosis Orders   1.  Elevated LFTs       At present patient is stable. No current GI symptoms. Patient is advised to have repeat LFTs as she had mild elevation about 1 month ago. Prior to this her liver enzymes were normal.  Has hx of fatty liver. Discussed with the patient regarding nature history of fatty liver disease and risk of progression to cirrhosis and complications. Strongly encouraged active lifestyle and daily exercise. Return in about 4 weeks (around 12/15/2020). Ilene Salazar is a 21 y.o. female being evaluated by a Virtual Visit (video visit) encounter to address concerns as mentioned above. A caregiver was present when appropriate. Due to this being a TeleHealth encounter (During [facility] public health emergency), evaluation of the following organ systems was limited: Vitals/Constitutional/EENT/Resp/CV/GI//MS/Neuro/Skin/Heme-Lymph-Imm. Pursuant to the emergency declaration under the [address] [place] [address], [address] [place] [address] authority and the [employer] and [employer] Act, this Virtual Visit was conducted with patient's (and/or legal guardian's) consent, to reduce the patient's risk of exposure to COVID-19 and provide necessary medical care. The patient (and/or legal guardian) has also been advised to contact this office for worsening conditions or problems, and seek emergency medical treatment and/or call 911 if deemed necessary. Patient identification was verified at the start of the visit: Yes    Total time spent on this encounter: 11-20 minutes    Services were provided through a video synchronous discussion virtually to substitute for in-person clinic visit. Patient and provider were located at their individual homes. --HILDA Elena NP on 11/17/2020 at 10:38 AM    An electronic signature was used to authenticate this note.

## 2020-12-15 ENCOUNTER — TELEPHONE (OUTPATIENT)
Dept: GASTROENTEROLOGY | Age: 20
End: 2020-12-15

## 2020-12-15 NOTE — TELEPHONE ENCOUNTER
Patient had a Virtual visit at 9:30 am 12/15/2020. Patient did not complete her labs or US. Patient was told that she could cancel her appointment and call back after she has completed her required testing. Patient agreed and stated that she will call the office back when she has completed all her testing.

## 2021-02-23 ENCOUNTER — OFFICE VISIT (OUTPATIENT)
Dept: FAMILY MEDICINE CLINIC | Age: 21
End: 2021-02-23
Payer: COMMERCIAL

## 2021-02-23 ENCOUNTER — HOSPITAL ENCOUNTER (OUTPATIENT)
Age: 21
Setting detail: SPECIMEN
Discharge: HOME | End: 2021-02-23
Payer: COMMERCIAL

## 2021-02-23 ENCOUNTER — HOSPITAL ENCOUNTER (OUTPATIENT)
Age: 21
Discharge: HOME | End: 2021-02-23
Payer: COMMERCIAL

## 2021-02-23 VITALS
BODY MASS INDEX: 42.44 KG/M2 | HEART RATE: 97 BPM | HEIGHT: 68 IN | TEMPERATURE: 97.8 F | WEIGHT: 280 LBS | OXYGEN SATURATION: 97 %

## 2021-02-23 DIAGNOSIS — R11.2 NON-INTRACTABLE VOMITING WITH NAUSEA, UNSPECIFIED VOMITING TYPE: ICD-10-CM

## 2021-02-23 DIAGNOSIS — R51.9 ACUTE NONINTRACTABLE HEADACHE, UNSPECIFIED HEADACHE TYPE: ICD-10-CM

## 2021-02-23 DIAGNOSIS — R11.2 NON-INTRACTABLE VOMITING WITH NAUSEA, UNSPECIFIED VOMITING TYPE: Primary | ICD-10-CM

## 2021-02-23 DIAGNOSIS — R79.89 ELEVATED LFTS: ICD-10-CM

## 2021-02-23 LAB
ALBUMIN SERPL-MCNC: 4.6 G/DL (ref 3.5–5.2)
ALBUMIN/GLOBULIN RATIO: ABNORMAL (ref 1–2.5)
ALP BLD-CCNC: 77 U/L (ref 35–104)
ALT SERPL-CCNC: 38 U/L (ref 5–33)
AST SERPL-CCNC: 24 U/L
BILIRUB SERPL-MCNC: 0.4 MG/DL (ref 0.3–1.2)
BILIRUBIN DIRECT: 0.11 MG/DL
BILIRUBIN, INDIRECT: 0.29 MG/DL (ref 0–1)
BILIRUBIN, POC: NEGATIVE
BLOOD URINE, POC: NEGATIVE
CLARITY, POC: CLEAR
COLOR, POC: NORMAL
CONTROL: YES
GLOBULIN: ABNORMAL G/DL (ref 1.5–3.8)
GLUCOSE URINE, POC: NEGATIVE
HAV IGM SER IA-ACNC: NONREACTIVE
HEPATITIS B CORE IGM ANTIBODY: NONREACTIVE
HEPATITIS B SURFACE ANTIGEN: NONREACTIVE
HEPATITIS C ANTIBODY: NONREACTIVE
KETONES, POC: NEGATIVE
LEUKOCYTE EST, POC: NEGATIVE
NITRITE, POC: NEGATIVE
PH, POC: 7.5
PREGNANCY TEST URINE, POC: NEGATIVE
PROTEIN, POC: NEGATIVE
SPECIFIC GRAVITY, POC: 1.02
TOTAL PROTEIN: 7.6 G/DL (ref 6.4–8.3)
UROBILINOGEN, POC: 0.2

## 2021-02-23 PROCEDURE — 36415 COLL VENOUS BLD VENIPUNCTURE: CPT

## 2021-02-23 PROCEDURE — 81025 URINE PREGNANCY TEST: CPT | Performed by: NURSE PRACTITIONER

## 2021-02-23 PROCEDURE — 80076 HEPATIC FUNCTION PANEL: CPT

## 2021-02-23 PROCEDURE — 80074 ACUTE HEPATITIS PANEL: CPT

## 2021-02-23 PROCEDURE — 81003 URINALYSIS AUTO W/O SCOPE: CPT | Performed by: NURSE PRACTITIONER

## 2021-02-23 PROCEDURE — 99214 OFFICE O/P EST MOD 30 MIN: CPT | Performed by: NURSE PRACTITIONER

## 2021-02-23 ASSESSMENT — ENCOUNTER SYMPTOMS
ABDOMINAL PAIN: 1
SWOLLEN GLANDS: 0
VOMITING: 1
SORE THROAT: 0
COUGH: 0
VISUAL CHANGE: 0
NAUSEA: 1
CHANGE IN BOWEL HABIT: 1

## 2021-02-23 ASSESSMENT — PATIENT HEALTH QUESTIONNAIRE - PHQ9
SUM OF ALL RESPONSES TO PHQ9 QUESTIONS 1 & 2: 0
1. LITTLE INTEREST OR PLEASURE IN DOING THINGS: 0
SUM OF ALL RESPONSES TO PHQ QUESTIONS 1-9: 0
SUM OF ALL RESPONSES TO PHQ QUESTIONS 1-9: 0
2. FEELING DOWN, DEPRESSED OR HOPELESS: 0

## 2021-02-23 NOTE — PROGRESS NOTES
[facility]   [address] [place] [address] [address] [facility] [address]  Dept: [phone]  Dept Fax: [phone]    Blu Barnes is a 21 y.o. female who presents to the urgent care today for her medical conditions/complaints as notedbelow. Blu Barnes is c/o of Nausea & Vomiting (vomitting mostly in morning, nausea,h/a on and off, cramping middle-lower abdomain,no covid expsoure, covid positive in July 2020)      HPI:     21 yr old female with hx IBS, n/v/diarhea, PCOS and amenorrhea presents for nausea and vomiting mostly in AM, intermittent lower abdominal cramping with looser stools than normal for last 2 days and intermittent HA for 5 days. Had Rich in July. Sx feel same. Sexually active, not on BC. No change in vaginal discharge. No UTI sx. Follows with Dr. Jasmin Robin, has lab orders and liver US ordered in December last year for elevated LFT's, has yet to complete w/u, Gi wants results before will see her again. CT scan for abdominal pain and leukocytosis in Aug 2020 was neg. Reports EGD with biopsy in past that was neg, no hx stomach ulcer. She is taking her prilosec . Tolerating water, fluids without difficulty  Needs work note. Nausea & Vomiting  This is a new problem. The current episode started in the past 7 days. Episode frequency: every morning, one episode once in the evening. The problem has been unchanged. Associated symptoms include abdominal pain ( intermittent lower abdominal cramping), a change in bowel habit, congestion (cpl weeks ago), headaches, nausea and vomiting. Pertinent negatives include no arthralgias, chest pain, chills, coughing, diaphoresis, fatigue, fever, joint swelling, myalgias, neck pain, numbness, rash, sore throat, swollen glands, urinary symptoms, vertigo, visual change or weakness. Nothing aggravates the symptoms. She has tried nothing for the symptoms. The treatment provided no relief.        Past Medical History: Diagnosis Date    PCOS (polycystic ovarian syndrome)     Pre-diabetes     Thyroid disease         Current Outpatient Medications   Medication Sig Dispense Refill    omeprazole (PRILOSEC) 20 MG delayed release capsule Take 1 capsule by mouth every morning (before breakfast) 90 capsule 1    metFORMIN (GLUCOPHAGE XR) 750 MG extended release tablet Take 1 tablet by mouth daily (with breakfast) 30 tablet 3    thyroid (ARMOUR) 60 MG tablet Take 60 mg by mouth daily      dicyclomine (BENTYL) 10 MG capsule Take 1 capsule by mouth every 6 hours as needed (cramps) (Patient not taking: Reported on 10/6/2020) 20 capsule 0     No current facility-administered medications for this visit. No Known Allergies    Subjective:      Review of Systems   Constitutional: Negative for chills, diaphoresis, fatigue and fever. HENT: Positive for congestion (cpl weeks ago). Negative for sore throat. Respiratory: Negative for cough. Cardiovascular: Negative for chest pain. Gastrointestinal: Positive for abdominal pain ( intermittent lower abdominal cramping), change in bowel habit, nausea and vomiting. Musculoskeletal: Negative for arthralgias, joint swelling, myalgias and neck pain. Skin: Negative for rash. Neurological: Positive for headaches. Negative for vertigo, weakness and numbness. All other systems reviewed and are negative. 14 systems reviewed and negative except as listed in HPI. Objective:     Physical Exam  Vitals signs and nursing note reviewed. Constitutional:       General: She is not in acute distress. Appearance: Normal appearance. She is obese. She is not ill-appearing, toxic-appearing or diaphoretic. Comments: Well nourished, nontoxic, well appearing   HENT:      Head: Normocephalic and atraumatic. Right Ear: External ear normal.      Left Ear: External ear normal.      Mouth/Throat:      Mouth: Mucous membranes are moist.   Eyes:      General: No scleral icterus. Right eye: No discharge. Left eye: No discharge. Extraocular Movements: Extraocular movements intact. Conjunctiva/sclera: Conjunctivae normal.      Pupils: Pupils are equal, round, and reactive to light. Neck:      Musculoskeletal: Neck supple. Cardiovascular:      Rate and Rhythm: Normal rate and regular rhythm. Pulses: Normal pulses. Heart sounds: Normal heart sounds. Pulmonary:      Effort: Pulmonary effort is normal.      Breath sounds: Normal breath sounds. Abdominal:      General: Bowel sounds are normal. There is no distension. Palpations: Abdomen is soft. Tenderness: There is no abdominal tenderness. There is no right CVA tenderness, left CVA tenderness, guarding or rebound. Musculoskeletal:         General: No signs of injury. Comments: Gait steady   Skin:     General: Skin is warm and dry. Capillary Refill: Capillary refill takes less than 2 seconds. Coloration: Skin is not jaundiced. Findings: No rash ( no rash to visible skin). Neurological:      General: No focal deficit present. Mental Status: She is alert and oriented to person, place, and time. Psychiatric:         Mood and Affect: Mood normal.         Behavior: Behavior normal.       Pulse 97   Temp 97.8 °F (36.6 °C) (Temporal)   Ht 5' 8\" (1.727 m)   Wt 280 lb (127 kg)   SpO2 97%   Breastfeeding No   BMI 42.57 kg/m²     Assessment:       Diagnosis Orders   1. Non-intractable vomiting with nausea, unspecified vomiting type  POCT urine pregnancy    POCT Urinalysis No Micro (Auto)    COVID-19   2.  Acute nonintractable headache, unspecified headache type  COVID-19       Plan:     Results for POC orders placed in visit on 02/23/21   POCT Urinalysis No Micro (Auto)   Result Value Ref Range    Color, UA light yellow     Clarity, UA clear     Glucose, UA POC negative     Bilirubin, UA negative     Ketones, UA negative     Spec Grav, UA 1.025     Blood, UA POC negative     pH, UA 7.5     Protein, UA POC negative     Urobilinogen, UA 0.2     Leukocytes, UA negative     Nitrite, UA negative    POCT urine pregnancy   Result Value Ref Range    Preg Test, Ur negative     Control yes       vss, well appearing, no fevers  Exam unremarkable  Urine preg neg  Urine dip negative  Get already ordered labs and liver US done  F/u GI  Work note  Reviewed over-the-counter treatments for symptom management. Will send out COVID19 testing. Possible treatment alterations based on the results. Patient instructed to self-quarantine until testing results are back. Patient instructed not to return to work until results are back. Tylenol as needed for fever/pain. Encouraged adequate hydration and rest.  The patient indicates understanding of these issues and agrees with the plan. Educational materials provided on AVS.  Follow up if symptoms do not improve/worsen. Discussed symptoms that will warrant urgent ED evaluation/treatment. Return for Follow up with GI for further evaluation. No orders of the defined types were placed in this encounter. Patient given educational materials - see patient instructions. Discussed use, benefit, and side effects of prescribed medications. All patient questions answered. Pt voicedunderstanding.     Electronically signed by HILDA Rocha CNP on 2/23/2021 at 11:23 AM

## 2021-02-23 NOTE — PATIENT INSTRUCTIONS
Get labs and ultrasound done and follow up with your GI Doctor. Patient Education        Nausea and Vomiting: Care Instructions  Your Care Instructions     When you are nauseated, you may feel weak and sweaty and notice a lot of saliva in your mouth. Nausea often leads to vomiting. Most of the time you do not need to worry about nausea and vomiting, but they can be signs of other illnesses. Two common causes of nausea and vomiting are stomach flu and food poisoning. Nausea and vomiting from viral stomach flu will usually start to improve within 24 hours. Nausea and vomiting from food poisoning may last from 12 to 48 hours. The doctor has checked you carefully, but problems can develop later. If you notice any problems or new symptoms, get medical treatment right away. Follow-up care is a key part of your treatment and safety. Be sure to make and go to all appointments, and call your doctor if you are having problems. It's also a good idea to know your test results and keep a list of the medicines you take. How can you care for yourself at home? · To prevent dehydration, drink plenty of fluids, enough so that your urine is light yellow or clear like water. Choose water and other caffeine-free clear liquids until you feel better. If you have kidney, heart, or liver disease and have to limit fluids, talk with your doctor before you increase the amount of fluids you drink. · Rest in bed until you feel better. · When you are able to eat, try clear soups, mild foods, and liquids until all symptoms are gone for 12 to 48 hours. Other good choices include dry toast, crackers, cooked cereal, and gelatin dessert, such as Jell-O. When should you call for help? Call 911 anytime you think you may need emergency care. For example, call if:    · You passed out (lost consciousness).    Call your doctor now or seek immediate medical care if:    · You have symptoms of dehydration, such as:  ? Dry eyes and a dry mouth.  ? Passing only a little dark urine. ? Feeling thirstier than usual.     · You have new or worsening belly pain.     · You have a new or higher fever.     · You vomit blood or what looks like coffee grounds. Watch closely for changes in your health, and be sure to contact your doctor if:    · You have ongoing nausea and vomiting.     · Your vomiting is getting worse.     · Your vomiting lasts longer than 2 days.     · You are not getting better as expected. Where can you learn more? Go to [email or web address] and sign in to your [employer] account. Enter 74 [id number] in the [employer] box to learn more about \"Nausea and Vomiting: Care Instructions. \"     If you do not have an account, please click on the \"Sign Up Now\" link. Current as of: June 26, 2019               Content Version: 12.6  © 9843-8419 [employer]. Care instructions adapted under license by [facility] ([facility]). If you have questions about a medical condition or this instruction, always ask your healthcare professional. [address] any warranty or liability for your use of this information.

## 2021-02-24 LAB
SARS-COV-2: NORMAL
SARS-COV-2: NOT DETECTED
SOURCE: NORMAL

## 2021-03-10 ENCOUNTER — HOSPITAL ENCOUNTER (OUTPATIENT)
Age: 21
Setting detail: SPECIMEN
Discharge: HOME | End: 2021-03-10
Payer: COMMERCIAL

## 2021-03-10 ENCOUNTER — PATIENT MESSAGE (OUTPATIENT)
Dept: OBGYN CLINIC | Age: 21
End: 2021-03-10

## 2021-03-10 ENCOUNTER — OFFICE VISIT (OUTPATIENT)
Dept: OBGYN CLINIC | Age: 21
End: 2021-03-10
Payer: COMMERCIAL

## 2021-03-10 VITALS
TEMPERATURE: 98 F | HEART RATE: 82 BPM | SYSTOLIC BLOOD PRESSURE: 105 MMHG | DIASTOLIC BLOOD PRESSURE: 74 MMHG | HEIGHT: 68 IN | BODY MASS INDEX: 40.47 KG/M2 | WEIGHT: 267 LBS

## 2021-03-10 DIAGNOSIS — N91.2 AMENORRHEA: ICD-10-CM

## 2021-03-10 DIAGNOSIS — N94.10 DYSPAREUNIA IN FEMALE: ICD-10-CM

## 2021-03-10 DIAGNOSIS — N76.0 ACUTE VAGINITIS: ICD-10-CM

## 2021-03-10 DIAGNOSIS — E28.2 PCO (POLYCYSTIC OVARIES): ICD-10-CM

## 2021-03-10 DIAGNOSIS — Z30.09 BIRTH CONTROL COUNSELING: ICD-10-CM

## 2021-03-10 DIAGNOSIS — Z32.02 NEGATIVE PREGNANCY TEST: ICD-10-CM

## 2021-03-10 DIAGNOSIS — Z30.011 ENCOUNTER FOR BCP (BIRTH CONTROL PILLS) INITIAL PRESCRIPTION: ICD-10-CM

## 2021-03-10 DIAGNOSIS — E28.2 PCOS (POLYCYSTIC OVARIAN SYNDROME): Primary | ICD-10-CM

## 2021-03-10 LAB
CONTROL: PRESENT
DIRECT EXAM: ABNORMAL
Lab: ABNORMAL
PREGNANCY TEST URINE, POC: NEGATIVE
SPECIMEN DESCRIPTION: ABNORMAL

## 2021-03-10 PROCEDURE — 81025 URINE PREGNANCY TEST: CPT | Performed by: CLINICAL NURSE SPECIALIST

## 2021-03-10 PROCEDURE — 99213 OFFICE O/P EST LOW 20 MIN: CPT | Performed by: CLINICAL NURSE SPECIALIST

## 2021-03-10 RX ORDER — NORGESTIMATE AND ETHINYL ESTRADIOL 7DAYSX3 LO
1 KIT ORAL DAILY
Qty: 28 TABLET | Refills: 2 | Status: SHIPPED | OUTPATIENT
Start: 2021-03-10 | End: 2021-11-11

## 2021-03-10 ASSESSMENT — ENCOUNTER SYMPTOMS
GASTROINTESTINAL NEGATIVE: 1
EYES NEGATIVE: 1
RESPIRATORY NEGATIVE: 1
ALLERGIC/IMMUNOLOGIC NEGATIVE: 1

## 2021-03-10 NOTE — PROGRESS NOTES
Subjective:      Patient ID:  Lidia Hair is a 21 y.o. female who presents for   Chief Complaint   Patient presents with    Dyspareunia    Contraception     discuss birth control pill        HPI  Patient is a 20 yo female who presents for painful intercourse which just began approx. 1 month ago. Patient states that she is not sure if it is due to friction and feels dryer than normal. Patient states that when they stop the intercourse the pain immediately stops. Patient reports that she has been experiencing some vaginal burning and feels it may be UTI related but states when she drinks cranberry juice it will go away for a few days then it returns and this has been an ongoing patter for the past 1 month. Patient is also wanting to discuss birth control to regulate menses. Patient states that her last menses was sept. 2019 and she reports she was not taking her thyroid medication or her metformin but has recently restarted the thyroid medication. Review of Systems   Constitutional: Negative for chills and fever. HENT: Negative. Eyes: Negative. Respiratory: Negative. Cardiovascular: Negative. Gastrointestinal: Negative. Endocrine: Negative. Genitourinary: Positive for dyspareunia (for the past few weeks) and menstrual problem (has not had a menses since sept. 2019 hx of PCO). Negative for dysuria and vaginal discharge. Musculoskeletal: Negative. Skin: Negative. Allergic/Immunologic: Negative. Neurological: Negative. Hematological: Negative. Psychiatric/Behavioral: Negative. /74 (Site: Left Upper Arm, Position: Sitting, Cuff Size: Large Adult)   Pulse 82   Temp 98 °F (36.7 °C) (Temporal)   Ht 5' 8\" (1.727 m)   Wt 267 lb (121.1 kg)   BMI 40.60 kg/m²    No LMP recorded. (Menstrual status: Irregular periods).     Family History   Problem Relation Age of Onset    Diabetes Father     Sleep Apnea Father     No Known Problems Mother       Past Medical History: Diagnosis Date    PCOS (polycystic ovarian syndrome)     Pre-diabetes     Thyroid disease       Past Surgical History:   Procedure Laterality Date    TONSILLECTOMY AND ADENOIDECTOMY  2016    UPPER GASTROINTESTINAL ENDOSCOPY N/A 2020    EGD BIOPSY AND PHOTOS performed by David Ricardo MD at [address] [place] [address] History     Socioeconomic History    Marital status: Single     Spouse name: None    Number of children: None    Years of education: None    Highest education level: None   Occupational History    None   Social Needs    Financial resource strain: None    Food insecurity     Worry: None     Inability: None    Transportation needs     Medical: None     Non-medical: None   Tobacco Use    Smoking status: Former Smoker     Quit date: 2020     Years since quittin.0    Smokeless tobacco: Never Used   Substance and Sexual Activity    Alcohol use: No    Drug use: Not Currently     Types: Marijuana     Comment: last time     Sexual activity: Yes     Partners: Male   Lifestyle    Physical activity     Days per week: None     Minutes per session: None    Stress: None   Relationships    Social connections     Talks on phone: None     Gets together: None     Attends Judaism service: None     Active member of club or organization: None     Attends meetings of clubs or organizations: None     Relationship status: None    Intimate partner violence     Fear of current or ex partner: None     Emotionally abused: None     Physically abused: None     Forced sexual activity: None   Other Topics Concern    None   Social History Narrative    None      Current Outpatient Medications   Medication Sig Dispense Refill    omeprazole (PRILOSEC) 20 MG delayed release capsule Take 1 capsule by mouth every morning (before breakfast) 90 capsule 1    metFORMIN (GLUCOPHAGE XR) 750 MG extended release tablet Take 1 tablet by mouth daily (with breakfast) 30 tablet 3    thyroid (ARMOUR) 60 maintenance recommendations and follow-up.     Electronically signed by: Jeancarlos Packer CNP

## 2021-03-11 ENCOUNTER — TELEPHONE (OUTPATIENT)
Dept: OBGYN CLINIC | Age: 21
End: 2021-03-11

## 2021-03-11 DIAGNOSIS — B37.9 CANDIDIASIS: Primary | ICD-10-CM

## 2021-03-11 RX ORDER — METFORMIN HYDROCHLORIDE 750 MG/1
750 TABLET, EXTENDED RELEASE ORAL
Qty: 30 TABLET | Refills: 5 | Status: SHIPPED | OUTPATIENT
Start: 2021-03-11 | End: 2021-12-29 | Stop reason: SDUPTHER

## 2021-03-11 RX ORDER — FLUCONAZOLE 150 MG/1
TABLET ORAL
Qty: 2 TABLET | Refills: 0 | Status: SHIPPED | OUTPATIENT
Start: 2021-03-11 | End: 2021-04-13 | Stop reason: ALTCHOICE

## 2021-03-11 NOTE — TELEPHONE ENCOUNTER
----- Message from HILDA Estevez - CNP sent at 3/11/2021  8:01 AM EST -----  Please let the patient know that she has yeast infection and I sent diflucan

## 2021-04-13 ENCOUNTER — OFFICE VISIT (OUTPATIENT)
Dept: ORTHOPEDIC SURGERY | Age: 21
End: 2021-04-13
Payer: COMMERCIAL

## 2021-04-13 VITALS — BODY MASS INDEX: 40.47 KG/M2 | HEIGHT: 68 IN | TEMPERATURE: 97.2 F | WEIGHT: 267 LBS

## 2021-04-13 DIAGNOSIS — M25.532 WRIST PAIN, LEFT: Primary | ICD-10-CM

## 2021-04-13 PROCEDURE — 99203 OFFICE O/P NEW LOW 30 MIN: CPT | Performed by: ORTHOPAEDIC SURGERY

## 2021-04-13 NOTE — PROGRESS NOTES
Patient ID: Josefina Alvarez is a 21 y.o. female    Chief Compliant:  No chief complaint on file. HPI:  This is a 21 y.o. female who presents to the clinic today for left wrist evaluation. Patient reports pain for greater than 1 year but significant aggravation over the last 4 months      Patient reports predominately ulnar-sided wrist pain with some occasional radiation down her thumb as well. Patient gets that pain into her thumb region only when she uses her hand as opposed to transfer from sit to stand etc.    Patient gets ulnar-sided wrist pain with wrist extension    Patient notes intermittent shooting pains of the left wrist, laterally and medially. She notes flexing the wrist causes pain. Review of Systems   All other systems reviewed and are negative. Past History:    Current Outpatient Medications:     fluconazole (DIFLUCAN) 150 MG tablet, Take one tablet today and repeat one tablet in 3 days. , Disp: 2 tablet, Rfl: 0    metFORMIN (GLUCOPHAGE XR) 750 MG extended release tablet, Take 1 tablet by mouth daily (with breakfast), Disp: 30 tablet, Rfl: 5    Norgestim-Eth Estrad Triphasic (ORTHO TRI-CYCLEN LO) 0.18/0.215/0.25 MG-25 MCG TABS, Take 1 Package by mouth daily, Disp: 28 tablet, Rfl: 2    omeprazole (PRILOSEC) 20 MG delayed release capsule, Take 1 capsule by mouth every morning (before breakfast), Disp: 90 capsule, Rfl: 1    dicyclomine (BENTYL) 10 MG capsule, Take 1 capsule by mouth every 6 hours as needed (cramps) (Patient not taking: Reported on 10/6/2020), Disp: 20 capsule, Rfl: 0    thyroid (ARMOUR) 60 MG tablet, Take 60 mg by mouth daily, Disp: , Rfl:   No Known Allergies  Social History     Socioeconomic History    Marital status: Single     Spouse name: Not on file    Number of children: Not on file    Years of education: Not on file    Highest education level: Not on file   Occupational History    Not on file   Social Needs    Financial resource strain: Not on file    Food insecurity     Worry: Not on file     Inability: Not on file    Transportation needs     Medical: Not on file     Non-medical: Not on file   Tobacco Use    Smoking status: Former Smoker     Quit date: 2020     Years since quittin.1    Smokeless tobacco: Never Used   Substance and Sexual Activity    Alcohol use: No    Drug use: Not Currently     Types: Marijuana     Comment: last time     Sexual activity: Yes     Partners: Male   Lifestyle    Physical activity     Days per week: Not on file     Minutes per session: Not on file    Stress: Not on file   Relationships    Social connections     Talks on phone: Not on file     Gets together: Not on file     Attends Yazidi service: Not on file     Active member of club or organization: Not on file     Attends meetings of clubs or organizations: Not on file     Relationship status: Not on file    Intimate partner violence     Fear of current or ex partner: Not on file     Emotionally abused: Not on file     Physically abused: Not on file     Forced sexual activity: Not on file   Other Topics Concern    Not on file   Social History Narrative    Not on file     Past Medical History:   Diagnosis Date    PCOS (polycystic ovarian syndrome)     Pre-diabetes     Thyroid disease      Past Surgical History:   Procedure Laterality Date    TONSILLECTOMY AND ADENOIDECTOMY  2016    UPPER GASTROINTESTINAL ENDOSCOPY N/A 2020    EGD BIOPSY AND PHOTOS performed by Weston Albert MD [facility]     Family History   Problem Relation Age of Onset    Diabetes Father     Sleep Apnea Father     No Known Problems Mother         Physical Exam:  Vitals signs and nursing note reviewed. Constitutional:       Appearance: She is well-developed. HENT:      Head: Normocephalic and atraumatic. Nose: Nose normal.   Eyes:      Conjunctiva/sclera: Conjunctivae normal.   Neck:      Musculoskeletal: Normal range of motion and neck supple. Pulmonary:      Effort: Pulmonary effort is normal. No respiratory distress. Musculoskeletal:      Comments: Normal gait     Skin:     General: Skin is warm and dry. Neurological:      Mental Status: She is alert and oriented to person, place, and time. Sensory: No sensory deficit. Psychiatric:         Behavior: Behavior normal.         Thought Content: Thought content normal.    Left wrist exam no obvious swelling normal range of motion diffuse but nonspecific tenderness to palpation over the scaphoid pain on the ulnar aspect of her wrist with wrist flexion less so with ulnar deviation        Diagnostic imaging:    AP lateral and oblique left wrist normal    Assessment and Plan:  1. Wrist pain, left      Patient's wrist pain predominately is ulnar-sided described as shooting      PT prescribed    MRI L wrist ordered    Patient will be referred to Dr. Brandon Courtney    Provider Attestation:  Floyd Salazar, personally performed the services described in this documentation. All medical record entries made by the scribe were at my direction and in my presence. I have reviewed the chart and discharge instructions and agree that the records reflect my personal performance and is accurate and complete. Bola Barraza MD 4/13/21     Scribe Attestation:  By signing my name below, Chirag Varghese, attest that this documentation has been prepared under the direction and in the presence of Dr. Basilio Johansen. Electronically signed: Stacey Mar, 4/13/21     Please note that this chart was generated using voice recognition Dragon dictation software. Although every effort was made to ensure the accuracy of this automated transcription, some errors in transcription may have occurred.

## 2021-05-07 RX ORDER — OMEPRAZOLE 20 MG/1
CAPSULE, DELAYED RELEASE ORAL
Qty: 90 CAPSULE | Refills: 0 | Status: SHIPPED | OUTPATIENT
Start: 2021-05-07 | End: 2022-10-12

## 2021-06-08 ENCOUNTER — TELEPHONE (OUTPATIENT)
Dept: OBGYN CLINIC | Age: 21
End: 2021-06-08

## 2021-06-08 NOTE — TELEPHONE ENCOUNTER
Called pt to f/u on missed appt today no answer lm for her to call office back to reschedule at earliest convenience.    No show letter sent

## 2021-10-27 ENCOUNTER — OFFICE VISIT (OUTPATIENT)
Dept: FAMILY MEDICINE CLINIC | Age: 21
End: 2021-10-27
Payer: COMMERCIAL

## 2021-10-27 ENCOUNTER — HOSPITAL ENCOUNTER (OUTPATIENT)
Age: 21
Setting detail: SPECIMEN
Discharge: HOME | End: 2021-10-27
Payer: COMMERCIAL

## 2021-10-27 VITALS
HEART RATE: 95 BPM | DIASTOLIC BLOOD PRESSURE: 85 MMHG | SYSTOLIC BLOOD PRESSURE: 124 MMHG | TEMPERATURE: 98.1 F | OXYGEN SATURATION: 98 %

## 2021-10-27 DIAGNOSIS — Z20.822 SUSPECTED COVID-19 VIRUS INFECTION: ICD-10-CM

## 2021-10-27 DIAGNOSIS — J01.90 ACUTE BACTERIAL SINUSITIS: Primary | ICD-10-CM

## 2021-10-27 DIAGNOSIS — B96.89 ACUTE BACTERIAL SINUSITIS: Primary | ICD-10-CM

## 2021-10-27 DIAGNOSIS — R05.9 COUGH: ICD-10-CM

## 2021-10-27 PROCEDURE — 99213 OFFICE O/P EST LOW 20 MIN: CPT | Performed by: NURSE PRACTITIONER

## 2021-10-27 RX ORDER — AZITHROMYCIN 250 MG/1
250 TABLET, FILM COATED ORAL SEE ADMIN INSTRUCTIONS
Qty: 6 TABLET | Refills: 0 | Status: SHIPPED | OUTPATIENT
Start: 2021-10-27 | End: 2021-11-01

## 2021-10-27 RX ORDER — ALBUTEROL SULFATE 90 UG/1
2 AEROSOL, METERED RESPIRATORY (INHALATION) EVERY 4 HOURS PRN
Qty: 18 G | Refills: 3 | Status: SHIPPED | OUTPATIENT
Start: 2021-10-27

## 2021-10-27 RX ORDER — BENZONATATE 100 MG/1
100 CAPSULE ORAL 3 TIMES DAILY PRN
Qty: 21 CAPSULE | Refills: 0 | Status: SHIPPED | OUTPATIENT
Start: 2021-10-27 | End: 2021-11-03

## 2021-10-27 ASSESSMENT — ENCOUNTER SYMPTOMS
HEMOPTYSIS: 0
HEARTBURN: 0
SORE THROAT: 0
WHEEZING: 0
COUGH: 1
SHORTNESS OF BREATH: 0
RHINORRHEA: 1

## 2021-10-27 NOTE — PROGRESS NOTES
[place] [address] [facility]   [address] [place] [address] [address] [facility] [address]  Dept: [phone]  Dept Fax: [phone]    Ghada Serna is a 24 y.o. female who presents to the urgent care today for her medical conditions/complaints as notedbelow. Ghada Serna is c/o of Cough (onset for a 1 week , worse at night , otc dayquil and nyquil helps a little) and Chest Congestion      HPI:     24 yr old female presents for cough, head congestion, covid testing. No sob, wheezing, no fevers. Sx for a week, cough worse at night when tries to sleep and sinuses drain. Starts coughing and can't stop. Mucous from nose yellow. Has flonase at home - not using. Hx inhaler use, no hx asthma or pneumonia. Pt vapes  covid vaccine x2 - Pfizer  Tried dayquil, nyquil, cough drops. Cough  This is a new problem. The current episode started in the past 7 days. The problem has been gradually worsening. The problem occurs every few minutes. The cough is non-productive. Associated symptoms include nasal congestion, postnasal drip and rhinorrhea. Pertinent negatives include no chest pain, chills, ear congestion, ear pain, fever, headaches, heartburn, hemoptysis, myalgias, rash, sore throat, shortness of breath, sweats, weight loss or wheezing. The symptoms are aggravated by lying down. Risk factors for lung disease include smoking/tobacco exposure. She has tried OTC cough suppressant (dayquil, nyquil, cough drops) for the symptoms. The treatment provided no relief. Her past medical history is significant for bronchitis and environmental allergies. There is no history of asthma, bronchiectasis, COPD, emphysema or pneumonia.        Past Medical History:   Diagnosis Date    PCOS (polycystic ovarian syndrome)     Pre-diabetes     Thyroid disease         Current Outpatient Medications   Medication Sig Dispense Refill    albuterol sulfate HFA (PROAIR HFA) 108 (90 Base) MCG/ACT inhaler Inhale 2 puffs into the lungs every 4 hours as needed for Wheezing 18 g 3    benzonatate (TESSALON PERLES) 100 MG capsule Take 1 capsule by mouth 3 times daily as needed for Cough 21 capsule 0    azithromycin (ZITHROMAX) 250 MG tablet Take 1 tablet by mouth See Admin Instructions for 5 days 500mg on day 1 followed by 250mg on days 2 - 5 6 tablet 0    omeprazole (PRILOSEC) 20 MG delayed release capsule TAKE 1 CAPSULE BY MOUTH ONCE DAILY IN THE MORNING BEFORE BREAKFAST 90 capsule 0    Cetirizine HCl (ZYRTEC PO) Take by mouth      metFORMIN (GLUCOPHAGE XR) 750 MG extended release tablet Take 1 tablet by mouth daily (with breakfast) 30 tablet 5    thyroid (ARMOUR) 60 MG tablet Take 60 mg by mouth daily       Norgestim-Eth Estrad Triphasic (ORTHO TRI-CYCLEN LO) 0.18/0.215/0.25 MG-25 MCG TABS Take 1 Package by mouth daily (Patient not taking: Reported on 10/27/2021) 28 tablet 2    dicyclomine (BENTYL) 10 MG capsule Take 1 capsule by mouth every 6 hours as needed (cramps) (Patient not taking: Reported on 10/6/2020) 20 capsule 0     No current facility-administered medications for this visit. No Known Allergies    Subjective:      Review of Systems   Constitutional: Negative for chills, fever and weight loss. HENT: Positive for postnasal drip and rhinorrhea. Negative for ear pain and sore throat. Respiratory: Positive for cough. Negative for hemoptysis, shortness of breath and wheezing. Cardiovascular: Negative for chest pain. Gastrointestinal: Negative for heartburn. Musculoskeletal: Negative for myalgias. Skin: Negative for rash. Allergic/Immunologic: Positive for environmental allergies. Neurological: Negative for headaches. All other systems reviewed and are negative. 14 systems reviewed and negative except as listed in HPI. Objective:     Physical Exam  Vitals and nursing note reviewed. Constitutional:       General: She is not in acute distress. Appearance: Normal appearance.  She is not ill-appearing, toxic-appearing or diaphoretic. HENT:      Head: Normocephalic and atraumatic. Right Ear: Tympanic membrane, ear canal and external ear normal.      Left Ear: Tympanic membrane, ear canal and external ear normal.      Nose: Congestion present. No rhinorrhea. Comments: nabil frontal sinus tenderness  No facial swelling or erythema     Mouth/Throat:      Mouth: Mucous membranes are moist.      Pharynx: No oropharyngeal exudate or posterior oropharyngeal erythema. Comments: + cobblestoning - thin yellow  Uvula midline no edema  Handling oral secretions without difficulty  Eyes:      General: No scleral icterus. Right eye: No discharge. Left eye: No discharge. Conjunctiva/sclera: Conjunctivae normal.      Pupils: Pupils are equal, round, and reactive to light. Cardiovascular:      Rate and Rhythm: Normal rate and regular rhythm. Pulses: Normal pulses. Heart sounds: Normal heart sounds. Pulmonary:      Effort: Pulmonary effort is normal. No respiratory distress. Breath sounds: No stridor. Wheezing present. No rhonchi or rales. Comments: Few scattered wheezes to lung bases posteriorly, cleared with cough  Abdominal:      General: Bowel sounds are normal.      Palpations: Abdomen is soft. Musculoskeletal:         General: No signs of injury. Cervical back: Normal range of motion and neck supple. Comments: Ambulated to and from room, gait steady, moving all ext without difficulty   Lymphadenopathy:      Cervical: No cervical adenopathy. Skin:     General: Skin is warm and dry. Findings: No rash ( no rash to visible skin). Neurological:      General: No focal deficit present. Mental Status: She is alert and oriented to person, place, and time.    Psychiatric:         Mood and Affect: Mood normal.         Behavior: Behavior normal.       /85 (Site: Left Upper Arm, Position: Sitting, Cuff Size: Large Adult)   Pulse 95 Temp 98.1 °F (36.7 °C) (Infrared)   SpO2 98%     Assessment:       Diagnosis Orders   1. Acute bacterial sinusitis  azithromycin (ZITHROMAX) 250 MG tablet   2. Cough  COVID-19    azithromycin (ZITHROMAX) 250 MG tablet   3. Suspected COVID-19 virus infection  COVID-19    azithromycin (ZITHROMAX) 250 MG tablet       Plan:    Occasional tight, np cough noted during exam  Few scattered wheezes, clear with cough, vapes. hx inhaler use for RAD  No fevers. covid vaccinated  Phineas Kentrell sx  Based on duration and severity, will start antibiotic in addition to review of otc tx and covid testing. Reviewed over-the-counter treatments for symptom management. Start home flonase for pnd  Start home antihistamine  rx tessalon perles  rx zpak  rx alb  Will send out COVID19 testing. Possible treatment alterations based on the results. Patient instructed to self-quarantine until testing results are back. Patient instructed not to return to work until results are back. Tylenol as needed for fever/pain. Encouraged adequate hydration and rest.  The patient indicates understanding of these issues and agrees with the plan. Educational materials provided on AVS.  Follow up if symptoms do not improve/worsen. Discussed symptoms that will warrant urgent ED evaluation/treatment. Return if symptoms worsen or fail to improve, for Make an Appt. with your Primary Care in 1 week.     Orders Placed This Encounter   Medications    albuterol sulfate HFA (PROAIR HFA) 108 (90 Base) MCG/ACT inhaler     Sig: Inhale 2 puffs into the lungs every 4 hours as needed for Wheezing     Dispense:  18 g     Refill:  3    benzonatate (TESSALON PERLES) 100 MG capsule     Sig: Take 1 capsule by mouth 3 times daily as needed for Cough     Dispense:  21 capsule     Refill:  0    azithromycin (ZITHROMAX) 250 MG tablet     Sig: Take 1 tablet by mouth See Admin Instructions for 5 days 500mg on day 1 followed by 250mg on days 2 - 5     Dispense:  6 tablet Refill:  0         Patient given educational materials - see patient instructions. Discussed use, benefit, and side effects of prescribed medications. All patient questions answered. Pt voicedunderstanding.     Electronically signed by HILDA Greenfield CNP on 10/27/2021 at 12:53 PM

## 2021-10-27 NOTE — PATIENT INSTRUCTIONS
Follow up with family doctor in 1 week as needed. Return immediately if worse, new symptoms develop, symptoms persist or have any questions or concerns. Push fluids, keep hydrated  Cool mist humidifier bedside  Continue all medications as prescribed  May alternate tylenol/motrin over the counter for pain or fever, take per package instructions. The COVID-19 test that was done today can take 1-6 days for results. Until then you should assume you have this disease and adhere to home isolation as described below. When we get the test results back, one of the following readings will be obtained. 1. A positive test means you have the virus. 2.  An inconclusive test means it wasn't sure if you have the virus or not. An inconclusive test result is treated as a positive result and recommendations  are the same as a positive test result. We may ask you to repeat this test in this circumstance. 3.  A negative test means you probably do not have the virus, but it is not conclusive. If your results of the COVID-19 test is POSITIVE- you must isolate yourself from others. You can be around others after:    10 days since symptoms first appeared (immunocompromised will quarantine for 20 days) and  24 hours with no fever without the use of fever-reducing medications and  Other symptoms of COVID-19 are improving*  *Loss of taste and smell may persist for weeks or months after recovery and need not delay the end of isolation  For people who are immunocopromised, quarantine may last for 20 days. Most people do not require testing to decide when they can be around others; however, if your healthcare provider recommends testing, they will let you know when you can resume being around others based on your test results. Note that these recommendations do not apply to persons with severe COVID-19 or with severely weakened immune systems (immunocompromised).  These persons should follow the guidance below for I was severely ill with COVID-19 or have a severely weakened immune system (immunocompromised) due to a health condition or medication. When can I be around others?     [email or web address]    Prevention steps for People with positive or inconclusive test results or suspected  COVID-19 (including persons under investigation) who do not need to be hospitalized  and   People with confirmed COVID-19 who were hospitalized and determined to be medically stable to go home    Contacts who are NOT healthcare providers or first responders and are asymptomatic (no fever,  cough, shortness of breath, or difficulty breathing) should self-quarantine for 14 days from the last  date of exposure to confirmed COVID-19. Your healthcare provider and public health staff will evaluate whether you can be cared for at home. If it is determined that you do not need to be hospitalized and can be isolated at home, you will be monitored by staff from your health department. You should follow the prevention steps below until a healthcare provider or local or state health department says you can return to your normal activities. Stay home except to get medical care  People who are mildly ill with COVID-19 are able to isolate at home during their illness. You should restrict activities outside your home, except for getting medical care. Do not go to work, school, or public areas. Avoid using public transportation, ride-sharing, or taxis. Separate yourself from other people and animals in your home  People: As much as possible, you should stay in a specific room and away from other people in your home. Also, you should use a separate bathroom, if available. Animals: You should restrict contact with pets and other animals while you are sick with COVID-19, just like you would around other people.  When possible, have another member of your household care for your animals while you are sick. If you are sick with COVID-19, avoid contact with your pet, including petting, snuggling, being kissed or licked, and sharing food. If you must care for your pet or be around animals while you are sick, wash your hands before and after you interact with pets and wear a facemask. Call ahead before visiting your doctor  If you have a medical appointment, call the healthcare provider and tell them that you have or may have COVID-19. This will help the healthcare providers office take steps to keep other people from getting infected or exposed. Wear a facemask  You should wear a facemask when you are around other people (e.g., sharing a room or vehicle) or pets and before you enter a healthcare providers office. If you are not able to wear a facemask (for example, because it causes trouble breathing), then people who live with you should not stay in the same room with you; they should also wear a facemask if they enter your room. Cover your coughs and sneezes  Cover your mouth and nose with a tissue when you cough or sneeze. Throw used tissues in a lined trash can. Immediately wash your hands with soap and water for at least 20 seconds or, if soap and water are not available, clean your hands with an alcohol-based hand  that contains at least 60% alcohol. Clean your hands often  Wash your hands often with soap and water for at least 20 seconds, especially after blowing your nose, coughing, or sneezing; going to the bathroom; and before eating or preparing food. If soap and water are not readily available, use an alcohol-based hand  with at least 60% alcohol, covering all surfaces of your hands and rubbing them together until they feel dry. Soap and water are the best option if hands are visibly dirty. Avoid touching your eyes, nose, and mouth with unwashed hands.   Avoid sharing personal household items  You should not share dishes, drinking glasses, cups, eating utensils, towels, or bedding with other people or pets in your home. After using these items, they should be washed thoroughly with soap and water. Clean all high-touch surfaces everyday  High touch surfaces include counters, tabletops, doorknobs, bathroom fixtures, toilets, phones, keyboards, tablets, and bedside tables. Also, clean any surfaces that may have blood, stool, or body fluids on them. Use a household cleaning spray or wipe, according to the label instructions. Labels contain instructions for safe and effective use of the cleaning product including precautions you should take when applying the product, such as wearing gloves and making sure you have good ventilation during use of the product. Monitor your symptoms  Seek prompt medical attention if your illness is worsening (e.g., difficulty breathing). Before seeking care, call your healthcare provider and tell them that you have, or are being evaluated for, COVID-19. Put on a facemask before you enter the facility. These steps will help the healthcare providers office to keep other people in the office or waiting room from getting infected or exposed. Persons who are placed under active monitoring or facilitated self-monitoring should follow instructions provided by their local health department or occupational health professionals, as appropriate. When working with your local health department check their available hours. If you have a medical emergency and need to call 911, notify the dispatch personnel that you have, or are being evaluated for COVID-19. If possible, put on a facemask before emergency medical services arrive. Discontinuing home isolation  Patients with confirmed COVID-19 should remain under home isolation precautions until the risk of secondary transmission to others is thought to be low. The decision to discontinue home isolation precautions should be made on a case-by-case basis, in consultation with your physician and the health department. Please do NOT make this decision on your own. Luann Corn- keeps someone who might have been exposed to the virus away from others  Isolation - keeps someone who is infected with the virus away from others, even in their homes    Scenario 1    Your patient has close contact with an individual who has COVID-19. Your patient will not have further contact. Plan - Your patient is quarantined from the last day of contact for 14 days    Scenario 2   Your patient has lives with someone who has COVID-19 but can avoid further contact. Plan - Your patient is quarantined for 14 days starting when the person with COVID-19 begins home isolation. Scenario 3    Your patient is under quarantine and has additional close contact with someone else who has COVID-19. Plan - Your patient must restart quarantine from the last COVID-19 exposure. Scenario 4   Your patient lives with someone who has COVID-19 and cannot avoid close contact from them. Plan - Your patient is quarantined while the other person is isolating and for 14 days after covid - 23 person meets the criteria to end home isolation. Treatment with monclonoal antibodies is under investigation and can be considered for patients with mild to moderate COVID-19 who are not on supplemental oxygen and are not hospitalized but who are at [address] [place] [address] for clinical progression have had onset of symptoms within the past 10 days. HIGH RISK is defined as patients who meet at least one of the following criteria:    Have a body mass index (BMI) ? 28    Have chronic kidney disease    Have diabetes    Have immunosuppressive disease    Are currently receiving immunosuppressive treatment    Are ?72years of age  [place]  Are ?54years of age AND have  *cardiovascular disease, OR  *hypertension, OR  *chronic obstructive pulmonary disease/other chronic respiratory disease.   Are 15- 16years of age AND have  *BMI ? 85th percentile for their age and gender based on CDC growth charts, [email or web address] , OR  *sickle cell disease, OR  *congenital or acquired heart disease, OR  *neurodevelopmental disorders, for example, cerebral palsy, OR  *a medical-related technological dependence, for example, tracheostomy, gastrostomy, or positive pressure ventilation (not related to   COVID-19), OR  *asthma, reactive airway or other chronic respiratory disease that requires daily medication for control. Other risk factors:   Moderate/severe dementia   Cancer being treated with palliative care   Cirrhosis   Pregnancy   Breast feeding   Weight less than 40Kg (88lbs)      If your results of the COVID-19 test is NEGATIVE -     The patient may stop isolation, in consultation with your health care provider, typically when: Your healthcare provider has determined that the cause of the illness is NOT COVID-19 and approves your return to work. OR  Ten (10) days have passed since onset of symptoms AND one day (24 hours) have passed with no fever without taking medication (like Tylenol) to reduce fever,  respiratory symptoms have resolved and you have been evaluated by your health care provider. Please follow up with your physician for evaluation about this. COVID-19 EXPOSURE    Quarantine  Quarantine if you have been in close contact (within 6 feet of someone for a cumulative total of 15 minutes or more over a 24-hour period) with someone who has COVID-19, unless you have been fully vaccinated. People who are fully vaccinated do NOT need to quarantine after contact with someone who had COVID-19 unless they have symptoms. However, fully vaccinated people should get tested 3-5 days after their exposure, even if they dont have symptoms and wear a mask indoors in public for 14 days following exposure or until their test result is negative.     The following websites are the best places for up to date information on this fluid situation. [employer].[email or web address]    The following applies to a person who has clinically recovered from  SARS-CoV-2 infection that was confirmed with a viral diagnostic test and then, within 3 months since the date of symptom onset of the previous illness episode (or date of positive viral diagnostic test if the person never experienced symptoms), is identified as a contact of a new case. If the person remains asymptomatic since the new exposure, then they do not need to be retested for SARS-CoV-2 and do not need to be quarantined. However, if the person experiences new symptoms consistent with COVID-19 and an evaluation fails to identify a diagnosis other than SARS-CoV-2 infection (e.g., influenza), then repeat viral diagnostic testing may be warranted, in consultation with an infectious disease specialist and public health authorities for isolation guidance. Patient Education        Sinusitis: Care Instructions  Your Care Instructions     Sinusitis is an infection of the lining of the sinus cavities in your head. Sinusitis often follows a cold. It causes pain and pressure in your head and face. In most cases, sinusitis gets better on its own in 1 to 2 weeks. But some mild symptoms may last for several weeks. Sometimes antibiotics are needed. Follow-up care is a key part of your treatment and safety. Be sure to make and go to all appointments, and call your doctor if you are having problems. It's also a good idea to know your test results and keep a list of the medicines you take. How can you care for yourself at home? · Take an over-the-counter pain medicine, such as acetaminophen (Tylenol), ibuprofen (Advil, Motrin), or naproxen (Aleve). Read and follow all instructions on the label. · If the doctor prescribed antibiotics, take them as directed. Do not stop taking them just because you feel better. You need to take the full course of antibiotics.   · Be careful when taking over-the-counter cold or flu medicines and Tylenol at the same time. Many of these medicines have acetaminophen, which is Tylenol. Read the labels to make sure that you are not taking more than the recommended dose. Too much acetaminophen (Tylenol) can be harmful. · Breathe warm, moist air from a steamy shower, a hot bath, or a sink filled with hot water. Avoid cold, dry air. Using a humidifier in your home may help. Follow the directions for cleaning the machine. · Use saline (saltwater) nasal washes. This can help keep your nasal passages open and wash out mucus and bacteria. You can buy saline nose drops at a grocery store or drugstore. Or you can make your own at home by adding 1 teaspoon of salt and 1 teaspoon of baking soda to 2 cups of distilled water. If you make your own, fill a bulb syringe with the solution, insert the tip into your nostril, and squeeze gently. Yanely Pacer your nose. · Put a hot, wet towel or a warm gel pack on your face 3 or 4 times a day for 5 to 10 minutes each time. · Try a decongestant nasal spray like oxymetazoline (Afrin). Do not use it for more than 3 days in a row. Using it for more than 3 days can make your congestion worse. When should you call for help? Call your doctor now or seek immediate medical care if:    · You have new or worse swelling or redness in your face or around your eyes.     · You have a new or higher fever. Watch closely for changes in your health, and be sure to contact your doctor if:    · You have new or worse facial pain.     · The mucus from your nose becomes thicker (like pus) or has new blood in it.     · You are not getting better as expected. Where can you learn more? Go to https://6renyou.com.[employer]. org and sign in to your [employer] account. Enter K156 in the [employer] box to learn more about \"Sinusitis: Care Instructions. \"     If you do not have an account, please click on the \"Sign Up Now\" link.   Current as of: December 2, 2020               Content Version: 13.0  © 9221-3101 Healthwise, Incorporated. Care instructions adapted under license by [facility] ([facility]). If you have questions about a medical condition or this instruction, always ask your healthcare professional. [address] any warranty or liability for your use of this information.

## 2021-10-28 LAB
SARS-COV-2: NORMAL
SARS-COV-2: NOT DETECTED
SOURCE: NORMAL

## 2021-11-11 ENCOUNTER — HOSPITAL ENCOUNTER (OUTPATIENT)
Dept: GENERAL RADIOLOGY | Age: 21
Discharge: HOME | End: 2021-11-13
Payer: COMMERCIAL

## 2021-11-11 ENCOUNTER — HOSPITAL ENCOUNTER (OUTPATIENT)
Age: 21
Discharge: HOME | End: 2021-11-13
Payer: COMMERCIAL

## 2021-11-11 ENCOUNTER — OFFICE VISIT (OUTPATIENT)
Dept: FAMILY MEDICINE CLINIC | Age: 21
End: 2021-11-11
Payer: COMMERCIAL

## 2021-11-11 VITALS
OXYGEN SATURATION: 93 % | HEART RATE: 100 BPM | SYSTOLIC BLOOD PRESSURE: 127 MMHG | TEMPERATURE: 98.3 F | DIASTOLIC BLOOD PRESSURE: 73 MMHG

## 2021-11-11 DIAGNOSIS — R06.2 WHEEZING: ICD-10-CM

## 2021-11-11 DIAGNOSIS — F17.200 SMOKER: ICD-10-CM

## 2021-11-11 DIAGNOSIS — J40 BRONCHITIS: ICD-10-CM

## 2021-11-11 DIAGNOSIS — R06.2 WHEEZING: Primary | ICD-10-CM

## 2021-11-11 DIAGNOSIS — R05.9 COUGH: ICD-10-CM

## 2021-11-11 PROCEDURE — 99213 OFFICE O/P EST LOW 20 MIN: CPT | Performed by: NURSE PRACTITIONER

## 2021-11-11 PROCEDURE — 71046 X-RAY EXAM CHEST 2 VIEWS: CPT

## 2021-11-11 RX ORDER — FLUTICASONE PROPIONATE 50 MCG
2 SPRAY, SUSPENSION (ML) NASAL DAILY
Qty: 16 G | Refills: 0 | Status: SHIPPED | OUTPATIENT
Start: 2021-11-11 | End: 2021-12-29

## 2021-11-11 RX ORDER — PREDNISONE 20 MG/1
20 TABLET ORAL 2 TIMES DAILY
Qty: 10 TABLET | Refills: 0 | Status: SHIPPED | OUTPATIENT
Start: 2021-11-11 | End: 2021-11-16

## 2021-11-11 ASSESSMENT — ENCOUNTER SYMPTOMS
ABDOMINAL PAIN: 0
HEARTBURN: 0
VOMITING: 1
RHINORRHEA: 1
SORE THROAT: 0
DIARRHEA: 0
SHORTNESS OF BREATH: 1
HEMOPTYSIS: 0
WHEEZING: 1
COUGH: 1

## 2021-11-11 NOTE — PATIENT INSTRUCTIONS
Go to [facility] when you leave here for your chest xray  Stop vaping      Follow up with family doctor in 1 week as needed. Return immediately if worse, new symptoms develop, symptoms persist or have any questions or concerns. Push fluids, keep hydrated  Cool mist humidifier bedside  Continue all medications as prescribed  May alternate tylenol/motrin over the counter for pain or fever, take per package instructions.

## 2021-11-11 NOTE — PROGRESS NOTES
[facility]   [address] [facility] [address]  Dept: [phone]  Dept Fax: [phone]    Abdiel Stoner is a 24 y.o. female who presents to the urgent care today for her medical conditions/complaints as notedbelow. Abdiel Stoner is c/o of Cough (onset for 3 weeks), Nausea (on and off ), Emesis (on and off), and Nasal Congestion (post nasal drip )      HPI:     24 yr old female returns to . Seen here 10/27 for cough/uri/covidlike sx. Dx with sinusitis, placed on zpak, inhaler for wheezing that cleared with cough, and tessalon perles  COVID test neg, also did additional covid test after brother tested + and was still neg  Dry np Cough continues for 3 weeks now,   Nausea in morning - feels from PND all night. Vomited x2 from the PND - clear mucous. No chance pregnancy per pt -  hx pcos. Hx covid last year, admitted, no hx pneumonia. Vapes. Inhaler very helpful with sx    Cough  This is a chronic problem. The current episode started 1 to 4 weeks ago. The problem has been unchanged. The problem occurs hourly. The cough is non-productive. Associated symptoms include postnasal drip, rhinorrhea, shortness of breath and wheezing. Pertinent negatives include no chest pain, chills, ear congestion, ear pain, fever, headaches, heartburn, hemoptysis, myalgias, nasal congestion, rash, sore throat, sweats or weight loss. The symptoms are aggravated by lying down (vaping). Risk factors for lung disease include smoking/tobacco exposure. She has tried a beta-agonist inhaler (zpak) for the symptoms. The treatment provided significant relief. Her past medical history is significant for bronchitis and environmental allergies. There is no history of asthma, bronchiectasis, COPD, emphysema or pneumonia. vapes   Emesis   This is a new problem. The current episode started 1 to 4 weeks ago. The problem occurs less than 2 times per day.  The problem has been unchanged. The emesis has an appearance of bile (clr mucous). There has been no fever. Associated symptoms include coughing and URI. Pertinent negatives include no abdominal pain, arthralgias, chest pain, chills, diarrhea, dizziness, fever, headaches, myalgias, sweats or weight loss. She has tried nothing for the symptoms. The treatment provided no relief. Past Medical History:   Diagnosis Date    PCOS (polycystic ovarian syndrome)     Pre-diabetes     Thyroid disease         Current Outpatient Medications   Medication Sig Dispense Refill    predniSONE (DELTASONE) 20 MG tablet Take 1 tablet by mouth 2 times daily for 5 days 10 tablet 0    fluticasone (FLONASE) 50 MCG/ACT nasal spray 2 sprays by Nasal route daily 16 g 0    albuterol sulfate HFA (PROAIR HFA) 108 (90 Base) MCG/ACT inhaler Inhale 2 puffs into the lungs every 4 hours as needed for Wheezing 18 g 3    omeprazole (PRILOSEC) 20 MG delayed release capsule TAKE 1 CAPSULE BY MOUTH ONCE DAILY IN THE MORNING BEFORE BREAKFAST 90 capsule 0    Cetirizine HCl (ZYRTEC PO) Take by mouth      metFORMIN (GLUCOPHAGE XR) 750 MG extended release tablet Take 1 tablet by mouth daily (with breakfast) 30 tablet 5    Norgestim-Eth Estrad Triphasic (ORTHO TRI-CYCLEN LO) 0.18/0.215/0.25 MG-25 MCG TABS Take 1 Package by mouth daily (Patient not taking: Reported on 10/27/2021) 28 tablet 2    dicyclomine (BENTYL) 10 MG capsule Take 1 capsule by mouth every 6 hours as needed (cramps) (Patient not taking: Reported on 10/6/2020) 20 capsule 0    thyroid (ARMOUR) 60 MG tablet Take 60 mg by mouth daily  (Patient not taking: Reported on 11/11/2021)       No current facility-administered medications for this visit. No Known Allergies    Subjective:      Review of Systems   Constitutional: Negative for chills, fever and weight loss. HENT: Positive for postnasal drip and rhinorrhea. Negative for ear pain and sore throat.     Respiratory: Positive for cough, shortness of breath and wheezing. Negative for hemoptysis. Cardiovascular: Negative for chest pain. Gastrointestinal: Positive for vomiting. Negative for abdominal pain, diarrhea and heartburn. Musculoskeletal: Negative for arthralgias and myalgias. Skin: Negative for rash. Allergic/Immunologic: Positive for environmental allergies. Neurological: Negative for dizziness and headaches. All other systems reviewed and are negative. 14 systems reviewed and negative except as listed in HPI. Objective:     Physical Exam  Vitals and nursing note reviewed. Constitutional:       General: She is not in acute distress. Appearance: Normal appearance. She is well-developed. She is not ill-appearing, toxic-appearing or diaphoretic. Comments: Nontoxic  Very well appearing  Smiling in room   HENT:      Head: Normocephalic and atraumatic. Right Ear: Tympanic membrane, ear canal and external ear normal.      Left Ear: Tympanic membrane, ear canal and external ear normal.      Nose: Congestion present. Mouth/Throat:      Mouth: Mucous membranes are moist.      Pharynx: No oropharyngeal exudate or posterior oropharyngeal erythema. Comments: Thin white PND  Swallows without difficulty  Eyes:      General: No scleral icterus. Right eye: No discharge. Left eye: No discharge. Conjunctiva/sclera: Conjunctivae normal.      Pupils: Pupils are equal, round, and reactive to light. Cardiovascular:      Rate and Rhythm: Normal rate and regular rhythm. Pulses: Normal pulses. Heart sounds: Normal heart sounds. Pulmonary:      Effort: Pulmonary effort is normal. No respiratory distress. Breath sounds: No stridor. Wheezing present. No rhonchi or rales. Comments: Left lobe with insp and exp wheezes  Moist np cough  Chest:      Chest wall: No tenderness. Abdominal:      General: Bowel sounds are normal. There is no distension. Palpations: Abdomen is soft. Tenderness: There is no abdominal tenderness. Musculoskeletal:         General: No tenderness or deformity. Normal range of motion. Cervical back: Normal range of motion and neck supple. Lymphadenopathy:      Cervical: No cervical adenopathy. Skin:     General: Skin is warm and dry. Capillary Refill: Capillary refill takes less than 2 seconds. Findings: No rash ( no rash to visible skin). Neurological:      General: No focal deficit present. Mental Status: She is alert and oriented to person, place, and time. Motor: No abnormal muscle tone. Coordination: Coordination normal.   Psychiatric:         Mood and Affect: Mood normal.         Behavior: Behavior normal.       /73 (Site: Left Upper Arm, Position: Sitting, Cuff Size: Large Adult)   Pulse 100   Temp 98.3 °F (36.8 °C) (Infrared)   SpO2 93%     Assessment:       Diagnosis Orders   1. Wheezing  XR CHEST STANDARD (2 VW)   2. Cough  XR CHEST STANDARD (2 VW)   3. Bronchitis     4. Smoker      vapes       Plan:    very well appearing female  No hx asthma but states had a lot of problems with lungs and wheezing when had covid a yr ago - continues to vape  left lobe wheezes posteriorly - last inhaler use last night, denies sob or chest tightness, wheezing does not clear with cough  Mild uri sx noted on exam - viral  finished zpak, states still has tessalon perles left and helpful   Alb inhaler most helpful   Prednisone rx for RAD like sx  flonase RX for pnd  Chest xray neg acuity - pt called and informed  Strongly enc to stop vape  F/u PCP for any further issues and for possible PFT's  Return for make appt with Family Doc in 3-4 days for recheck.     Orders Placed This Encounter   Medications    predniSONE (DELTASONE) 20 MG tablet     Sig: Take 1 tablet by mouth 2 times daily for 5 days     Dispense:  10 tablet     Refill:  0    fluticasone (FLONASE) 50 MCG/ACT nasal spray     Si sprays by Nasal route daily Dispense:  16 g     Refill:  0         Patient given educational materials - see patient instructions. Discussed use, benefit, and side effects of prescribed medications. All patient questions answered. Pt voicedunderstanding.     Electronically signed by HILDA Lucas CNP on 11/11/2021 at 5:05 PM

## 2021-12-02 ENCOUNTER — OFFICE VISIT (OUTPATIENT)
Dept: FAMILY MEDICINE CLINIC | Age: 21
End: 2021-12-02
Payer: COMMERCIAL

## 2021-12-02 VITALS
HEART RATE: 87 BPM | DIASTOLIC BLOOD PRESSURE: 80 MMHG | SYSTOLIC BLOOD PRESSURE: 116 MMHG | OXYGEN SATURATION: 98 % | TEMPERATURE: 98.4 F

## 2021-12-02 DIAGNOSIS — A09 DIARRHEA OF INFECTIOUS ORIGIN: Primary | ICD-10-CM

## 2021-12-02 PROCEDURE — 99213 OFFICE O/P EST LOW 20 MIN: CPT | Performed by: NURSE PRACTITIONER

## 2021-12-02 RX ORDER — LOPERAMIDE HYDROCHLORIDE 2 MG/1
2 CAPSULE ORAL 4 TIMES DAILY PRN
Qty: 40 CAPSULE | Refills: 0 | Status: SHIPPED | OUTPATIENT
Start: 2021-12-02 | End: 2021-12-12

## 2021-12-02 ASSESSMENT — ENCOUNTER SYMPTOMS
CHEST TIGHTNESS: 0
ABDOMINAL PAIN: 0
DIARRHEA: 1
ALLERGIC/IMMUNOLOGIC NEGATIVE: 1
EYE DISCHARGE: 0
VOMITING: 1
SHORTNESS OF BREATH: 0
EYE ITCHING: 0
NAUSEA: 1
COUGH: 0
SORE THROAT: 0
EYES NEGATIVE: 1

## 2021-12-02 NOTE — PATIENT INSTRUCTIONS
Patient Education        Gastroenteritis: Care Instructions  Your Care Instructions     Gastroenteritis is an illness that may cause nausea, vomiting, and diarrhea. It is sometimes called \"stomach flu. \" It can be caused by bacteria or a virus. You will probably begin to feel better in 1 to 2 days. In the meantime, get plenty of rest and make sure you do not become dehydrated. Dehydration occurs when your body loses too much fluid. Follow-up care is a key part of your treatment and safety. Be sure to make and go to all appointments, and call your doctor if you are having problems. It's also a good idea to know your test results and keep a list of the medicines you take. How can you care for yourself at home? · If your doctor prescribed antibiotics, take them as directed. Do not stop taking them just because you feel better. You need to take the full course of antibiotics. · Drink plenty of fluids to prevent dehydration. Choose water and other clear liquids until you feel better. If you have kidney, heart, or liver disease and have to limit fluids, talk with your doctor before you increase your fluid intake. · Drink fluids slowly, in frequent, small amounts, because drinking too much too fast can cause vomiting. · Begin eating mild foods, such as dry toast, yogurt, applesauce, bananas, and rice. Avoid spicy, hot, or high-fat foods, and do not drink alcohol or caffeine for a day or two. Do not drink milk or eat ice cream until you are feeling better. How to prevent gastroenteritis  · Keep hot foods hot and cold foods cold. · Do not eat meats, dressings, salads, or other foods that have been kept at room temperature for more than 2 hours. · Use a thermometer to check your refrigerator. It should be between 34°F and 40°F.  · Defrost meats in the refrigerator or microwave, not on the kitchen counter. · Keep your hands and your kitchen clean.  Wash your hands, cutting boards, and countertops with hot soapy water

## 2021-12-02 NOTE — PROGRESS NOTES
[facility]   [address] [place] [address] [address] [place] [address]  Dept: [phone]  Dept Fax: [phone]    Dilcia Jacobson is a 24 y.o. female who presents today forher medical conditions/complaints as noted below. Dilcia Jacobson is c/o of   Chief Complaint   Patient presents with    Nausea     onset for 3-4 days otc mucenix and tylenol    Emesis    Diarrhea     HPI:     Emesis   This is a new problem. The current episode started in the past 7 days. Associated symptoms include diarrhea. Pertinent negatives include no abdominal pain, chest pain, chills, coughing, dizziness, fever or headaches. Taking  Mucinex and Tylenol PRN. Sees GI. Has been ongoing x's 3-4 days. Taking Tylenol and Mucinex PRN.      Past Medical History:   Diagnosis Date    PCOS (polycystic ovarian syndrome)     Pre-diabetes     Thyroid disease       Past Surgical History:   Procedure Laterality Date    TONSILLECTOMY AND ADENOIDECTOMY  2016    UPPER GASTROINTESTINAL ENDOSCOPY N/A 2020    EGD BIOPSY AND PHOTOS performed by Elyse Vences [facility] at [facility]     Family History   Problem Relation Age of Onset    Diabetes Father     Sleep Apnea Father     No Known Problems Mother      Social History     Tobacco Use    Smoking status: Former Smoker     Quit date: 2020     Years since quittin.7    Smokeless tobacco: Never Used   Substance Use Topics    Alcohol use: No      Current Outpatient Medications   Medication Sig Dispense Refill    loperamide (RA ANTI-DIARRHEAL) 2 MG capsule Take 1 capsule by mouth 4 times daily as needed for Diarrhea 40 capsule 0    fluticasone (FLONASE) 50 MCG/ACT nasal spray 2 sprays by Nasal route daily 16 g 0    albuterol sulfate HFA (PROAIR HFA) 108 (90 Base) MCG/ACT inhaler Inhale 2 puffs into the lungs every 4 hours as needed for Wheezing 18 g 3    omeprazole (PRILOSEC) 20 MG delayed release capsule TAKE 1 CAPSULE BY MOUTH ONCE DAILY IN THE MORNING BEFORE BREAKFAST 90 capsule 0    Cetirizine HCl (ZYRTEC PO) Take by mouth      metFORMIN (GLUCOPHAGE XR) 750 MG extended release tablet Take 1 tablet by mouth daily (with breakfast) 30 tablet 5    thyroid (ARMOUR) 60 MG tablet Take 60 mg by mouth daily  (Patient not taking: Reported on 11/11/2021)       No current facility-administered medications for this visit. No Known Allergies    Subjective:      Review of Systems   Constitutional: Negative for appetite change, chills, fatigue and fever. HENT: Negative for congestion, postnasal drip and sore throat. Eyes: Negative. Negative for discharge and itching. Respiratory: Negative for cough, chest tightness and shortness of breath. Cardiovascular: Negative for chest pain, palpitations and leg swelling. Gastrointestinal: Positive for diarrhea, nausea and vomiting. Negative for abdominal pain. Endocrine: Negative. Genitourinary: Negative for dysuria, frequency and urgency. Musculoskeletal: Negative for neck pain and neck stiffness. Skin: Negative for rash. Allergic/Immunologic: Negative. Neurological: Negative for dizziness, weakness, light-headedness and headaches. Hematological: Negative for adenopathy. Psychiatric/Behavioral: Negative for confusion. Objective:      Physical Exam  Vitals reviewed. Constitutional:       Appearance: She is well-developed. HENT:      Head: Normocephalic. Right Ear: External ear normal.      Left Ear: External ear normal.      Nose: Nose normal.   Eyes:      Conjunctiva/sclera: Conjunctivae normal.      Pupils: Pupils are equal, round, and reactive to light. Cardiovascular:      Rate and Rhythm: Normal rate and regular rhythm. Heart sounds: Normal heart sounds. No murmur heard. No friction rub. No gallop. Pulmonary:      Effort: Pulmonary effort is normal. No respiratory distress. Breath sounds: Normal breath sounds.    Abdominal:      General: Bowel sounds are normal.      Palpations: Abdomen is soft. Musculoskeletal:         General: Normal range of motion. Cervical back: Normal range of motion and neck supple. Lymphadenopathy:      Cervical: No cervical adenopathy. Skin:     General: Skin is warm and dry. Findings: No rash. Neurological:      Mental Status: She is alert and oriented to person, place, and time. Cranial Nerves: No cranial nerve deficit. Coordination: Coordination normal.      Deep Tendon Reflexes: Reflexes are normal and symmetric. Psychiatric:         Thought Content: Thought content normal.       /80 (Site: Left Upper Arm, Position: Sitting, Cuff Size: Large Adult)   Pulse 87   Temp 98.4 °F (36.9 °C) (Infrared)   SpO2 98%     Assessment:       Diagnosis Orders   1. Diarrhea of infectious origin  loperamide (RA ANTI-DIARRHEAL) 2 MG capsule           Plan:     1.) [place] diet advanced as tolerated   2.) Increase fluids   3.) Imodium PRN   4.) Decline Zofran at this time   5.) RTO PRN   6.) Follow-up with PCP     Problem List     None         Patient given educationalmaterials - see patient instructions. Discussed use, benefit, and side effectsof prescribed medications. All patient questions answered. Pt verbalized understanding. Instructed to continue current medications, diet and exercise. Patient agreedwith treatment plan. Follow up as directed.      Electronically signed by HILDA Ngo CNP on 12/2/2021 at 5:56 PM

## 2021-12-29 ENCOUNTER — OFFICE VISIT (OUTPATIENT)
Dept: OBGYN CLINIC | Age: 21
End: 2021-12-29
Payer: COMMERCIAL

## 2021-12-29 ENCOUNTER — HOSPITAL ENCOUNTER (OUTPATIENT)
Age: 21
Setting detail: SPECIMEN
Discharge: HOME | End: 2021-12-29

## 2021-12-29 VITALS
DIASTOLIC BLOOD PRESSURE: 86 MMHG | BODY MASS INDEX: 41.83 KG/M2 | SYSTOLIC BLOOD PRESSURE: 130 MMHG | HEIGHT: 68 IN | HEART RATE: 101 BPM | WEIGHT: 276 LBS

## 2021-12-29 DIAGNOSIS — E28.2 PCO (POLYCYSTIC OVARIES): ICD-10-CM

## 2021-12-29 DIAGNOSIS — Z30.09 BIRTH CONTROL COUNSELING: ICD-10-CM

## 2021-12-29 DIAGNOSIS — Z32.02 NEGATIVE PREGNANCY TEST: ICD-10-CM

## 2021-12-29 DIAGNOSIS — Z78.9 USES HORMONAL CONTRACEPTIVE PATCH AS PRIMARY BIRTH CONTROL METHOD: ICD-10-CM

## 2021-12-29 DIAGNOSIS — Z01.419 PAP SMEAR, AS PART OF ROUTINE GYNECOLOGICAL EXAMINATION: Primary | ICD-10-CM

## 2021-12-29 DIAGNOSIS — N91.2 AMENORRHEA: ICD-10-CM

## 2021-12-29 LAB
CONTROL: NORMAL
PREGNANCY TEST URINE, POC: NEGATIVE

## 2021-12-29 PROCEDURE — 99395 PREV VISIT EST AGE 18-39: CPT | Performed by: CLINICAL NURSE SPECIALIST

## 2021-12-29 PROCEDURE — 81025 URINE PREGNANCY TEST: CPT | Performed by: CLINICAL NURSE SPECIALIST

## 2021-12-29 RX ORDER — METFORMIN HYDROCHLORIDE 750 MG/1
750 TABLET, EXTENDED RELEASE ORAL
Qty: 30 TABLET | Refills: 5 | Status: SHIPPED | OUTPATIENT
Start: 2021-12-29 | End: 2022-03-28 | Stop reason: SDUPTHER

## 2021-12-29 RX ORDER — LEVOTHYROXINE AND LIOTHYRONINE 38; 9 UG/1; UG/1
60 TABLET ORAL DAILY
Qty: 30 TABLET | Refills: 1 | Status: SHIPPED | OUTPATIENT
Start: 2021-12-29 | End: 2022-10-12

## 2021-12-29 NOTE — PROGRESS NOTES
[facility] [address] [place] [address] OB GYN  [address] [address]  Dept: [phone]        DATE OF VISIT:  21        History and Physical    Atif Masters    :  2000  CHIEF COMPLAINT:    Chief Complaint   Patient presents with    Gynecologic Exam                    Atif Masters is a 24 y.o. female who presents for annual well woman exam.  Patient has PCO and is interested in birth control to regulate menses. The patient was seen and examined. Per the patient bowels areregular. She has no voiding complaints. She denies any bloating as well as vaginal discharge. Chaperone for Intimate Exam   Chaperone was offered as part of the rooming process. Patient declined and agrees to continue with exam without a chaperone.    Chaperone: none     _____________________________________________________________________  Past Medical History:   Diagnosis Date    PCOS (polycystic ovarian syndrome)     Pre-diabetes     Thyroid disease                                                                    Past Surgical History:   Procedure Laterality Date    TONSILLECTOMY AND ADENOIDECTOMY  2016    UPPER GASTROINTESTINAL ENDOSCOPY N/A 2020    EGD BIOPSY AND PHOTOS performed by Miguelito Driscoll MD at [address] [place] [address]     Family History   Problem Relation Age of Onset    Diabetes Father     Sleep Apnea Father     Hypertension Father     No Known Problems Mother     Breast Cancer Paternal Grandmother      Social History     Tobacco Use   Smoking Status Former Smoker    Quit date: 2020    Years since quittin.8   Smokeless Tobacco Never Used     Social History     Substance and Sexual Activity   Alcohol Use Yes    Comment: socially     Current Outpatient Medications   Medication Sig Dispense Refill    metFORMIN (GLUCOPHAGE XR) 750 MG extended release tablet Take 1 tablet by mouth daily (with breakfast) 30 tablet 5    thyroid (ARMOUR) 60 MG tablet Take 1 tablet by mouth daily 30 tablet 1    norelgestromin-ethinyl estradiol (ORTHO EVRA) 150-35 MCG/24HR Place 1 patch onto the skin once a week PLACE ONE NEW PATCH ON SKIN, ON THE SAME DAY EVERY WEEK. 3 patch 2    albuterol sulfate HFA (PROAIR HFA) 108 (90 Base) MCG/ACT inhaler Inhale 2 puffs into the lungs every 4 hours as needed for Wheezing 18 g 3    omeprazole (PRILOSEC) 20 MG delayed release capsule TAKE 1 CAPSULE BY MOUTH ONCE DAILY IN THE MORNING BEFORE BREAKFAST 90 capsule 0    Cetirizine HCl (ZYRTEC PO) Take by mouth       No current facility-administered medications for this visit. Allergies:  No Known Allergies    Gynecologic History:  Patient's last menstrual period was 2019.   Sexually Active: Yes  STD History:No  Birth Control: No    OB History    Para Term  AB Living   0 0 0 0 0 0   SAB IAB Ectopic Molar Multiple Live Births   0 0 0 0 0 0     ______________________________________________________________________    Review of Systems    REVIEW OFSYSTEMS:        Constitutional:  Unexpected weight change, extreme fatigue, night sweats              no  Skin:                           Rashes, moles   no  Neurological:  Frequentheadaches, seizures         no  Ophthalmic:  Recent visual changes no  ENT:   Difficulty swallowing  no  Breast:              Masses, pain, nipple discharge                            no     Respiratory:  Shortness of breath, coughing smoker           yes    Cardiovascular: Chest pain   no     Gastrointestinal: Chronic diarrhea/constipation, nausea/vomiting           no   Urogenital:  Urinary incontinence, frequency, urgency          no                                         Heavy/irregular periods    Patient states that she has not had a menses since 2019       yes                                      Vaginal discharge                   no  Hematological: Bruises easy   no     Endocrine:  Hot flashes rarely  yes     Hot/Cold Intolerance  no    Psychological: Mood and affect were within normal limits. Intermittently and does not feel she needs meds for depression but has anxiety and feels she could benefit from medication   yes                 Physical Exam    Physical Exam:    Vitals:    12/29/21 1052   BP: 130/86   Pulse: 101   Weight: 276 lb (125.2 kg)   Height: 5' 8\" (1.727 m)       General Appearance: This  is a well developed, well nourished, well groomed female. Her BMI was reviewed. Nutritional decision making andexercise were discussed. Neurological:  The patient is alert and oriented to time,place, person, and situation    Skin:  A brief inspection of the skin revealed no rashes or lesions. Neck:  The neck was supple. Respiratory: There was unlabored respiratory effort. Lungs clear to ascultation. Cardiovascular: The patients extremities were without calf tenderness or edema. Heart with a regular rate and rhythm. Abdomen: The abdomen was soft and non-tender with no guarding, rebound or rigidity. No hernias were appreciated. Breast:   The patients breasts were symmetrical.  There were no masses, discharge or retractions noted. Self breast exams were reviewed. Pelvic Exam:  The external genitalia was with a normal appearance. The vaginal vault was normal. There were no cystocele, rectocele, or enterocele appreciated. There was no vaginal discharge. The cervix was without lesions. There was no cervical motion tenderness. The uterus was mobile, midline and regular. The adnexa no fullness, tenderness or masses appreciated. ASSESSMENT:     Normal annual well woman exam    24 y.o. Female; Annual   Diagnosis Orders   1. Pap smear, as part of routine gynecological examination  PAP SMEAR   2. Birth control counseling  POCT urine pregnancy   3.  PCO (polycystic ovaries)  POCT urine pregnancy    metFORMIN (GLUCOPHAGE XR) 750 MG extended release tablet    norelgestromin-ethinyl estradiol (ORTHO EVRA) 150-35 MCG/24HR   4. Amenorrhea  norelgestromin-ethinyl estradiol (ORTHO EVRA) 150-35 MCG/24HR   5. Negative pregnancy test     6. Uses hormonal contraceptive patch as primary birth control method                       PLAN:  - Pap collected. Discussed new papsmear guidelines. - Birth control Discussed. Discussed with patient birth control patch risks, side effects and use and patient verbalized understanding.  - Smoking risk factors Discussed  - Diet and exercise reviewed. - Routine healthmaintenance per patients PCP.  - Return to clinic in 1 year or earlier with questions, problems, concerns. Return in about 3 months (around 3/29/2022) for med ck.         Electronically signed by HILDA Levy CNP on 12/29/2021 at 11:46 AM

## 2022-01-10 LAB — CYTOLOGY REPORT: NORMAL

## 2022-02-27 ENCOUNTER — HOSPITAL ENCOUNTER (EMERGENCY)
Age: 22
Discharge: HOME | End: 2022-02-27
Attending: EMERGENCY MEDICINE
Payer: COMMERCIAL

## 2022-02-27 VITALS
RESPIRATION RATE: 18 BRPM | OXYGEN SATURATION: 100 % | HEART RATE: 88 BPM | SYSTOLIC BLOOD PRESSURE: 117 MMHG | HEIGHT: 68 IN | TEMPERATURE: 98.3 F | BODY MASS INDEX: 41.97 KG/M2 | DIASTOLIC BLOOD PRESSURE: 73 MMHG

## 2022-02-27 DIAGNOSIS — R11.2 NAUSEA AND VOMITING, INTRACTABILITY OF VOMITING NOT SPECIFIED, UNSPECIFIED VOMITING TYPE: Primary | ICD-10-CM

## 2022-02-27 PROCEDURE — 96372 THER/PROPH/DIAG INJ SC/IM: CPT

## 2022-02-27 PROCEDURE — 99283 EMERGENCY DEPT VISIT LOW MDM: CPT

## 2022-02-27 PROCEDURE — 6360000002 HC RX W HCPCS: Performed by: EMERGENCY MEDICINE

## 2022-02-27 RX ORDER — HALOPERIDOL 5 MG/ML
5 INJECTION INTRAMUSCULAR ONCE
Status: COMPLETED | OUTPATIENT
Start: 2022-02-27 | End: 2022-02-27

## 2022-02-27 RX ADMIN — HALOPERIDOL LACTATE 5 MG: 5 INJECTION, SOLUTION INTRAMUSCULAR at 01:45

## 2022-02-27 ASSESSMENT — PAIN SCALES - GENERAL: PAINLEVEL_OUTOF10: 4

## 2022-02-27 ASSESSMENT — PAIN DESCRIPTION - LOCATION: LOCATION: ABDOMEN

## 2022-02-27 ASSESSMENT — PAIN - FUNCTIONAL ASSESSMENT: PAIN_FUNCTIONAL_ASSESSMENT: 0-10

## 2022-02-27 ASSESSMENT — ENCOUNTER SYMPTOMS
NAUSEA: 1
ABDOMINAL PAIN: 0

## 2022-02-27 ASSESSMENT — PAIN DESCRIPTION - ORIENTATION: ORIENTATION: MID

## 2022-02-27 NOTE — ED NOTES
Tolerating PO intake. Okay for discharge. Verbalized understanding of d/c instructions. Encouraged to f/u with PCP.       Gregoria Law RN  02/27/22 0810

## 2022-02-27 NOTE — ED PROVIDER NOTES
(PRILOSEC) 20 MG DELAYED RELEASE CAPSULE    TAKE 1 CAPSULE BY MOUTH ONCE DAILY IN THE MORNING BEFORE BREAKFAST    THYROID (ARMOUR) 60 MG TABLET    Take 1 tablet by mouth daily     ALLERGIES     has No Known Allergies. FAMILY HISTORY     She indicated that the status of her mother is unknown. She indicated that the status of her father is unknown. She indicated that the status of her paternal grandmother is unknown. SOCIAL HISTORY       Social History     Tobacco Use    Smoking status: Former Smoker     Quit date: 2020     Years since quittin.0    Smokeless tobacco: Never Used   Vaping Use    Vaping Use: Every day    Last attempt to quit: 2020    Substances: Nicotine    Devices: Disposable   Substance Use Topics    Alcohol use: Yes     Comment: socially    Drug use: Not Currently     Types: Marijuana Brooklyn Adonis)     Comment: last time      PHYSICAL EXAM     INITIAL VITALS: /84   Pulse 98   Temp 98.3 °F (36.8 °C) (Oral)   Resp 16   Ht 5' 8\" (1.727 m)   LMP 02/15/2022 (Exact Date)   SpO2 96%   BMI 41.97 kg/m²    Physical Exam  Constitutional:       General: She is not in acute distress. Appearance: Normal appearance. She is well-developed. She is not diaphoretic. HENT:      Head: Normocephalic and atraumatic. Right Ear: External ear normal.      Left Ear: External ear normal.      Nose: Nose normal. No congestion. Mouth/Throat:      Mouth: Mucous membranes are moist.      Pharynx: Oropharynx is clear. Eyes:      General:         Right eye: No discharge. Left eye: No discharge. Conjunctiva/sclera: Conjunctivae normal.      Pupils: Pupils are equal, round, and reactive to light. Neck:      Trachea: No tracheal deviation. Cardiovascular:      Rate and Rhythm: Normal rate and regular rhythm. Pulses: Normal pulses. Heart sounds: Normal heart sounds. Pulmonary:      Effort: Pulmonary effort is normal. No respiratory distress.       Breath sounds: Normal breath sounds. No stridor. No wheezing or rales. Abdominal:      Palpations: Abdomen is soft. Tenderness: There is no abdominal tenderness. There is no guarding or rebound. Musculoskeletal:         General: No tenderness or deformity. Normal range of motion. Cervical back: Normal range of motion and neck supple. Skin:     General: Skin is warm and dry. Capillary Refill: Capillary refill takes less than 2 seconds. Findings: No erythema or rash. Neurological:      General: No focal deficit present. Mental Status: She is alert and oriented to person, place, and time. Coordination: Coordination normal.   Psychiatric:         Mood and Affect: Mood normal.         Behavior: Behavior normal.         Thought Content: Thought content normal.         Judgment: Judgment normal.         MEDICAL DECISION MAKING:   Patient was seen and examined at bedside soon after arrival to the emergency department. Chart was reviewed include history and physical examination was performed. Presentation is consistent with nausea vomiting. Patient was given a dose of Haldol and felt improved. CRITICAL CARE:       PROCEDURES:    Procedures    DIAGNOSTIC RESULTS   EKG:All EKG's are interpreted by the Emergency Department Physician who either signs or Co-signs this chart in the absence of a cardiologist.        RADIOLOGY:All plain film, CT, MRI, and formal ultrasound images (except ED bedside ultrasound) are read by the radiologist, see reports below, unless otherwisenoted in MDM or here. No orders to display     LABS: All lab results were reviewed by myself, and all abnormals are listed below. Labs Reviewed - No data to display    EMERGENCY DEPARTMENTCOURSE:     Patient was revealed no to be feeling improved and was given follow-up with her primary.   Vitals:    Vitals:    02/27/22 0108   BP: 129/84   Pulse: 98   Resp: 16   Temp: 98.3 °F (36.8 °C)   TempSrc: Oral   SpO2: 96% Height: 5' 8\" (1.727 m)       The patient was given the following medications while in the emergency department:  Orders Placed This Encounter   Medications    haloperidol lactate (HALDOL) injection 5 mg     CONSULTS:  None    FINAL IMPRESSION      1.  Nausea and vomiting, intractability of vomiting not specified, unspecified vomiting type          DISPOSITION/PLAN   DISPOSITION        PATIENT REFERRED TO:  Raphael Champion MD  [address] [place] [id number]  [phone]    In 1 day      DISCHARGE MEDICATIONS:  New Prescriptions    No medications on file     The care is provided during an unprecedented national emergency due to the novel coronavirus, COVID [address],   02/27/22 8013

## 2022-02-27 NOTE — ED TRIAGE NOTES
Mode of arrival (squad #, walk in, police, etc) : car        Chief complaint(s):Nausea Vomiting hot flashes      Arrival Note (brief scenario, treatment PTA, etc). : 3-4 days starting nausea and vomiting with dizziness and hot flashes have been using THC         C= \"Have you ever felt that you should Cut down on your drinking? \"  No  A= \"Have people Annoyed you by criticizing your drinking? \"  No  G= \"Have you ever felt bad or Guilty about your drinking? \"  No  E= \"Have you ever had a drink as an Eye-opener first thing in the morning to steady your nerves or to help a hangover? \"  Yes      Deferred []      Reason for deferring: N/A    *If yes to two or more: probable alcohol abuse. *

## 2022-03-19 DIAGNOSIS — N91.2 AMENORRHEA: ICD-10-CM

## 2022-03-19 DIAGNOSIS — E28.2 PCO (POLYCYSTIC OVARIES): ICD-10-CM

## 2022-03-21 RX ORDER — NORELGESTROMIN AND ETHINLY ESTRADIOL 150; 35 UG/D; UG/D
PATCH TRANSDERMAL
Qty: 3 PATCH | Refills: 5 | Status: SHIPPED | OUTPATIENT
Start: 2022-03-21

## 2022-03-28 ENCOUNTER — OFFICE VISIT (OUTPATIENT)
Dept: OBGYN CLINIC | Age: 22
End: 2022-03-28
Payer: COMMERCIAL

## 2022-03-28 VITALS
DIASTOLIC BLOOD PRESSURE: 78 MMHG | BODY MASS INDEX: 39.86 KG/M2 | HEART RATE: 82 BPM | HEIGHT: 68 IN | WEIGHT: 263 LBS | SYSTOLIC BLOOD PRESSURE: 116 MMHG

## 2022-03-28 DIAGNOSIS — Z78.9 USES HORMONAL CONTRACEPTIVE PATCH AS PRIMARY BIRTH CONTROL METHOD: Primary | ICD-10-CM

## 2022-03-28 DIAGNOSIS — E28.2 PCO (POLYCYSTIC OVARIES): ICD-10-CM

## 2022-03-28 DIAGNOSIS — N94.6 DYSMENORRHEA: ICD-10-CM

## 2022-03-28 DIAGNOSIS — Z87.42 HISTORY OF IRREGULAR MENSTRUAL CYCLES: ICD-10-CM

## 2022-03-28 PROCEDURE — 99213 OFFICE O/P EST LOW 20 MIN: CPT | Performed by: CLINICAL NURSE SPECIALIST

## 2022-03-28 RX ORDER — METFORMIN HYDROCHLORIDE 750 MG/1
750 TABLET, EXTENDED RELEASE ORAL
Qty: 90 TABLET | Refills: 1 | Status: SHIPPED | OUTPATIENT
Start: 2022-03-28 | End: 2022-10-12

## 2022-03-28 ASSESSMENT — PATIENT HEALTH QUESTIONNAIRE - PHQ9
SUM OF ALL RESPONSES TO PHQ QUESTIONS 1-9: 0
2. FEELING DOWN, DEPRESSED OR HOPELESS: 0
1. LITTLE INTEREST OR PLEASURE IN DOING THINGS: 0
SUM OF ALL RESPONSES TO PHQ QUESTIONS 1-9: 0
SUM OF ALL RESPONSES TO PHQ QUESTIONS 1-9: 0
SUM OF ALL RESPONSES TO PHQ9 QUESTIONS 1 & 2: 0
SUM OF ALL RESPONSES TO PHQ QUESTIONS 1-9: 0

## 2022-03-28 NOTE — PROGRESS NOTES
Subjective:      Patient ID:  Rufino Estevez is a 24 y.o. female who presents for   Chief Complaint   Patient presents with    Follow-up       HPI     Patient is a 25 yo female who presents for birth control follow up for heavy menses. Patient states that she is having a difficult time with the patch falling off and she has to add tape or bandaid to keep it on her skin. Patient reports that she is now having once monthly menses lasting 8-10 days long and flow is moderate with cramping but is tolerable. Patient states that this a great improvement from before starting birth control and would like to continue. Review of Systems   Constitutional: Negative for chills and fever. HENT: Negative. Eyes: Negative. Respiratory: Negative. Cardiovascular: Negative. Gastrointestinal: Negative. Endocrine: Negative. Genitourinary: Negative for dysuria, menstrual problem (now once monthly menses lasting 8-10 days, with mild cramping that is tolerable) and vaginal discharge. Musculoskeletal: Negative. Skin: Negative. Allergic/Immunologic: Negative. Neurological: Negative. Hematological: Negative. Psychiatric/Behavioral: Negative. /78 (Site: Left Upper Arm, Position: Sitting, Cuff Size: Medium Adult)   Pulse 82   Ht 5' 8\" (1.727 m)   Wt 263 lb (119.3 kg)   LMP 03/07/2022 (Exact Date)   Breastfeeding No   BMI 39.99 kg/m²    Patient's last menstrual period was 03/07/2022 (exact date).     Family History   Problem Relation Age of Onset    Diabetes Father     Sleep Apnea Father     Hypertension Father     No Known Problems Mother     Breast Cancer Paternal Grandmother       Past Medical History:   Diagnosis Date    PCOS (polycystic ovarian syndrome)     Pre-diabetes     Thyroid disease       Past Surgical History:   Procedure Laterality Date    TONSILLECTOMY AND ADENOIDECTOMY  06/2016    UPPER GASTROINTESTINAL ENDOSCOPY N/A 9/8/2020    EGD BIOPSY AND PHOTOS performed by Nicole Whatley MD at [address] [place] [address] History     Socioeconomic History    Marital status: Single     Spouse name: None    Number of children: None    Years of education: None    Highest education level: None   Occupational History    None   Tobacco Use    Smoking status: Former Smoker     Quit date: 2020     Years since quittin.0    Smokeless tobacco: Never Used   Vaping Use    Vaping Use: Every day    Last attempt to quit: 2020    Substances: Nicotine    Devices: Disposable   Substance and Sexual Activity    Alcohol use: Yes     Comment: socially    Drug use: Not Currently     Types: Marijuana Eudelia Loan)     Comment: last time     Sexual activity: Yes     Partners: Male   Other Topics Concern    None   Social History Narrative    None     Social Determinants of Health     Financial Resource Strain:     Difficulty of Paying Living Expenses: Not on file   Food Insecurity:     Worried About Running Out of Food in the Last Year: Not on file    Taina of Food in the Last Year: Not on file   Transportation Needs:     Lack of Transportation (Medical): Not on file    Lack of Transportation (Non-Medical):  Not on file   Physical Activity:     Days of Exercise per Week: Not on file    Minutes of Exercise per Session: Not on file   Stress:     Feeling of Stress : Not on file   Social Connections:     Frequency of Communication with Friends and Family: Not on file    Frequency of Social Gatherings with Friends and Family: Not on file    Attends Anabaptist Services: Not on file    Active Member of Clubs or Organizations: Not on file    Attends Club or Organization Meetings: Not on file    Marital Status: Not on file   Intimate Partner Violence:     Fear of Current or Ex-Partner: Not on file    Emotionally Abused: Not on file    Physically Abused: Not on file    Sexually Abused: Not on file   Housing Stability:     Unable to Pay for Housing in the Last Year: Not on file    Number of Places Lived in the Last Year: Not on file    Unstable Housing in the Last Year: Not on file      Current Outpatient Medications   Medication Sig Dispense Refill    metFORMIN (GLUCOPHAGE XR) 750 MG extended release tablet Take 1 tablet by mouth daily (with breakfast) 90 tablet 1    ZAFEMY 150-35 MCG/24HR APPLY 1 PATCH TOPICALLY ONCE A WEEK . PLACE  A  NEW  PATCH  ON  THE  SKIN  ON  THE  SAME  DAY  EVERY  WEEK 3 patch 5    thyroid (ARMOUR) 60 MG tablet Take 1 tablet by mouth daily 30 tablet 1    albuterol sulfate HFA (PROAIR HFA) 108 (90 Base) MCG/ACT inhaler Inhale 2 puffs into the lungs every 4 hours as needed for Wheezing 18 g 3    omeprazole (PRILOSEC) 20 MG delayed release capsule TAKE 1 CAPSULE BY MOUTH ONCE DAILY IN THE MORNING BEFORE BREAKFAST 90 capsule 0    Cetirizine HCl (ZYRTEC PO) Take by mouth       No current facility-administered medications for this visit. Objective:   Physical Exam  Vitals reviewed. Constitutional:       Appearance: She is well-developed. HENT:      Head: Normocephalic and atraumatic. Eyes:      Conjunctiva/sclera: Conjunctivae normal.   Cardiovascular:      Rate and Rhythm: Normal rate and regular rhythm. Pulmonary:      Effort: Pulmonary effort is normal.      Breath sounds: Normal breath sounds. Musculoskeletal:         General: Normal range of motion. Cervical back: Normal range of motion and neck supple. Skin:     General: Skin is warm and dry. Neurological:      Mental Status: She is oriented to person, place, and time. Psychiatric:         Behavior: Behavior normal.         Thought Content: Thought content normal.         Judgment: Judgment normal.         Assessment:      Diagnosis Orders   1. Uses hormonal contraceptive patch as primary birth control method     2. History of irregular menstrual cycles     3. PCO (polycystic ovaries)  metFORMIN (GLUCOPHAGE XR) 750 MG extended release tablet   4.  Dysmenorrhea Plan:    Discussed with patient using Xulane and witting TASNEEM   Written script given for xulane 150-35 with 6 refills TASNEEM given to patient     Return in about 6 months (around 9/28/2022) for med ck. Patient was seen with total face to face time of 30 minutes. More than 50% of this visit was on counseling andeducation regarding the problems listed below and her options. She was also counseled on her preventative health maintenance recommendations and follow-up.     Electronically signed by: Pamela Parikh CNP

## 2022-05-02 ENCOUNTER — HOSPITAL ENCOUNTER (EMERGENCY)
Age: 22
Discharge: HOME | End: 2022-05-02
Attending: EMERGENCY MEDICINE
Payer: COMMERCIAL

## 2022-05-02 VITALS
SYSTOLIC BLOOD PRESSURE: 131 MMHG | OXYGEN SATURATION: 99 % | HEIGHT: 68 IN | DIASTOLIC BLOOD PRESSURE: 73 MMHG | HEART RATE: 70 BPM | RESPIRATION RATE: 24 BRPM | BODY MASS INDEX: 37.89 KG/M2 | TEMPERATURE: 98.1 F | WEIGHT: 250 LBS

## 2022-05-02 DIAGNOSIS — M54.42 ACUTE BILATERAL LOW BACK PAIN WITH LEFT-SIDED SCIATICA: Primary | ICD-10-CM

## 2022-05-02 LAB — HCG(URINE) PREGNANCY TEST: NEGATIVE

## 2022-05-02 PROCEDURE — 81025 URINE PREGNANCY TEST: CPT

## 2022-05-02 PROCEDURE — 99284 EMERGENCY DEPT VISIT MOD MDM: CPT

## 2022-05-02 PROCEDURE — 6360000002 HC RX W HCPCS: Performed by: PHYSICIAN ASSISTANT

## 2022-05-02 PROCEDURE — 96372 THER/PROPH/DIAG INJ SC/IM: CPT

## 2022-05-02 RX ORDER — CYCLOBENZAPRINE HCL 10 MG
10 TABLET ORAL NIGHTLY PRN
Qty: 10 TABLET | Refills: 0 | Status: SHIPPED | OUTPATIENT
Start: 2022-05-02 | End: 2022-05-12

## 2022-05-02 RX ORDER — ORPHENADRINE CITRATE 30 MG/ML
60 INJECTION INTRAMUSCULAR; INTRAVENOUS ONCE
Status: COMPLETED | OUTPATIENT
Start: 2022-05-02 | End: 2022-05-02

## 2022-05-02 RX ORDER — TRAMADOL HYDROCHLORIDE 50 MG/1
50 TABLET ORAL EVERY 4 HOURS PRN
Qty: 10 TABLET | Refills: 0 | Status: SHIPPED | OUTPATIENT
Start: 2022-05-02 | End: 2022-05-05

## 2022-05-02 RX ORDER — IBUPROFEN 800 MG/1
800 TABLET ORAL EVERY 8 HOURS PRN
Qty: 20 TABLET | Refills: 0 | Status: SHIPPED | OUTPATIENT
Start: 2022-05-02 | End: 2022-10-12

## 2022-05-02 RX ORDER — KETOROLAC TROMETHAMINE 30 MG/ML
30 INJECTION, SOLUTION INTRAMUSCULAR; INTRAVENOUS ONCE
Status: COMPLETED | OUTPATIENT
Start: 2022-05-02 | End: 2022-05-02

## 2022-05-02 RX ADMIN — ORPHENADRINE CITRATE 60 MG: 30 INJECTION INTRAMUSCULAR; INTRAVENOUS at 16:19

## 2022-05-02 RX ADMIN — KETOROLAC TROMETHAMINE 30 MG: 30 INJECTION, SOLUTION INTRAMUSCULAR at 16:19

## 2022-05-02 ASSESSMENT — PAIN SCALES - GENERAL
PAINLEVEL_OUTOF10: 6
PAINLEVEL_OUTOF10: 9

## 2022-05-02 ASSESSMENT — PAIN DESCRIPTION - LOCATION
LOCATION: BACK
LOCATION: BACK

## 2022-05-02 ASSESSMENT — LIFESTYLE VARIABLES
HOW MANY STANDARD DRINKS CONTAINING ALCOHOL DO YOU HAVE ON A TYPICAL DAY: 1 OR 2
HOW OFTEN DO YOU HAVE A DRINK CONTAINING ALCOHOL: MONTHLY OR LESS

## 2022-05-02 ASSESSMENT — PAIN DESCRIPTION - ORIENTATION
ORIENTATION: LEFT
ORIENTATION: LEFT

## 2022-05-02 ASSESSMENT — PAIN DESCRIPTION - FREQUENCY: FREQUENCY: CONTINUOUS

## 2022-05-02 ASSESSMENT — PAIN DESCRIPTION - DIRECTION: RADIATING_TOWARDS: DOWN LEG

## 2022-05-02 ASSESSMENT — PAIN - FUNCTIONAL ASSESSMENT: PAIN_FUNCTIONAL_ASSESSMENT: 0-10

## 2022-05-02 NOTE — ED PROVIDER NOTES
eMERGENCY dEPARTMENT eNCOUnter   [address] Name: Rio Rojas  MRN: [id number]  [place] [address]  Date of evaluation: 5/2/22     Rio Rojas is a 24 y.o. female with CC:back pain  Based on the medical record the care appears appropriate. I was personally available for consultation in the Emergency Department. The care is provided during an unprecedented national emergency due to the novel coronavirus, COVID 19.     Jina Peterson MD  Attending Emergency Physician                   Jina Peterson MD  05/02/22 7977

## 2022-05-02 NOTE — ED TRIAGE NOTES
Pt states she is having pain for the last 2 weeks, radiating down to the L leg, L leg not numb or tingling more of a cramping sensation

## 2022-05-02 NOTE — ED PROVIDER NOTES
[address] ED  eMERGENCY dEPARTMENT eNCOUnter      Pt Name: Nakia Tovar  MRN: [id number]  [place] 2000  Date of evaluation: 5/2/2022  Provider: Reji Mcgill PA-C    CHIEF COMPLAINT     No chief complaint on file. HISTORY OF PRESENT ILLNESS  (Location/Symptom, Timing/Onset, Context/Setting, Quality, Duration, Modifying Factors, Severity.)   Nakia Tovar is a 24 y.o. female who presents to the emergency department via EMS for evaluation of lower back pain radiating down left leg. Pt reports pain has been ongoing for a few days but is worsening. Denies injury but states she sits Munira style all day long. Currently, pain is 9/10 shooting down left leg. Denies numbness, weakness, loss of bowel or bladder control, saddle anesthesia, abd pain, nausea, emesis, cp, sob, fevers. Pt has tried a steroid UC gave her with no relief. She is unsure if she could be pregnancy. No other complaints. Patient has a history of chronic back pain. No loss of bowel or bladder control, no numbness or tingling  Patient denies any history of IV drug use, denies any fevers, chills, abdominal pain nausea or vomiting        Nursing Notes were reviewed. REVIEW OF SYSTEMS    (2-9 systems for level 4, 10 or more for level 5)     Review of Systems   Constitutional: Negative. Respiratory: Negative. Cardiovascular: Negative. Gastrointestinal: Negative. Musculoskeletal: Complaining of back pain  Genitourinary: Negative. Skin: Negative. Neurological: Negative. Except as noted above the remainder of the review of systems was reviewed and negative.        PAST MEDICAL HISTORY         Diagnosis Date    PCOS (polycystic ovarian syndrome)     Pre-diabetes     Thyroid disease      None otherwise stated in nurses notes    SURGICAL HISTORY           Procedure Laterality Date    TONSILLECTOMY AND ADENOIDECTOMY  06/2016    UPPER GASTROINTESTINAL ENDOSCOPY N/A 9/8/2020    EGD BIOPSY AND PHOTOS performed by Shahrzad Dupree MD at [facility]     None otherwise stated in nurses notes    CURRENT MEDICATIONS       Previous Medications    ALBUTEROL SULFATE HFA (PROAIR HFA) 108 (90 BASE) MCG/ACT INHALER    Inhale 2 puffs into the lungs every 4 hours as needed for Wheezing    CETIRIZINE HCL (ZYRTEC PO)    Take by mouth    METFORMIN (GLUCOPHAGE XR) 750 MG EXTENDED RELEASE TABLET    Take 1 tablet by mouth daily (with breakfast)    OMEPRAZOLE (PRILOSEC) 20 MG DELAYED RELEASE CAPSULE    TAKE 1 CAPSULE BY MOUTH ONCE DAILY IN THE MORNING BEFORE BREAKFAST    THYROID (ARMOUR) 60 MG TABLET    Take 1 tablet by mouth daily    ZAFEMY 150-35 MCG/24HR    APPLY 1 PATCH TOPICALLY ONCE A WEEK . PLACE  A  NEW  PATCH  ON  THE  SKIN  ON  THE  SAME  DAY  EVERY  WEEK       ALLERGIES     Zofran [ondansetron]    FAMILY HISTORY           Problem Relation Age of Onset    Diabetes Father     Sleep Apnea Father     Hypertension Father     No Known Problems Mother     Breast Cancer Paternal Grandmother      Family Status   Relation Name Status    Father  (Not Specified)    Mother  (Not Specified)    PGM  (Not Specified)      None otherwise stated in nurses notes    SOCIAL HISTORY      reports that she quit smoking about 2 years ago. She has never used smokeless tobacco. She reports current alcohol use. She reports previous drug use. Drug: Marijuana Myrtis Regulus). Lives at home with others      PHYSICAL EXAM    (up to 7 for level 4, 8 or more for level 5)     ED Triage Vitals [05/02/22 1442]   BP Temp Temp Source Pulse Resp SpO2 Height Weight   131/73 98.1 °F (36.7 °C) Oral 70 24 99 % 5' 8\" (1.727 m) 250 lb (113.4 kg)       Physical Exam   Nursing note and vitals reviewed. Constitutional: Oriented to person, place, and time and well-developed, well-nourished  Head: Normocephalic and atraumatic. Ear: External ears normal.   Nose: Nose normal and midline.    Eyes: Conjunctivae and EOM are normal. Pupils are equal, round, and reactive to light.   Neck: Normal range of motion. Cardiovascular: Normal rate, regular rhythm, normal heart sounds and intact distal pulses. Pulmonary/Chest: Effort normal and breath sounds normal. No respiratory distress. No wheezes. No rales. No chest tenderness. Abdomen:  soft, nontender. No distended   Musculoskeletal: Normal range of motion. Mild paraspinal muscle tenderness over right and left lumbar spine, lumbar midline tenderness, no step-off deformity, no swelling, no rashes, pt able to stand on toes and heels. Pt ambulatory. Neurological: Alert and oriented to person, place, and time. GCS score is 15.  5/5 strength in bilateral lower extremities with sensation to light touch intact. Proprioception intact. downgoing babinski. 2/2 dp and pt pulses. Ambulatory to the bathroom. Skin: Skin is warm and dry. No rash noted. No erythema. No pallor. DIAGNOSTIC RESULTS   RADIOLOGY:   All plain film, CT, MRI, and formal ultrasound images (except ED bedside ultrasound) are read by the radiologist, see reports below, unless otherwise noted in MDM or here. No orders to display               LABS:  Labs Reviewed   PREGNANCY, URINE       All other labs were within normal range or not returned as of this dictation.     EMERGENCY DEPARTMENT COURSE and DIFFERENTIAL DIAGNOSIS/MDM:   Vitals:    Vitals:    05/02/22 1442   BP: 131/73   Pulse: 70   Resp: 24   Temp: 98.1 °F (36.7 °C)   TempSrc: Oral   SpO2: 99%   Weight: 250 lb (113.4 kg)   Height: 5' 8\" (1.727 m)           ED MEDICATIONS  Orders Placed This Encounter   Medications    ketorolac (TORADOL) injection 30 mg    orphenadrine (NORFLEX) injection 60 mg    ibuprofen (ADVIL;MOTRIN) 800 MG tablet     Sig: Take 1 tablet by mouth every 8 hours as needed for Pain     Dispense:  20 tablet     Refill:  0    cyclobenzaprine (FLEXERIL) 10 MG tablet     Sig: Take 1 tablet by mouth nightly as needed for Muscle spasms     Dispense:  10 tablet     Refill:  0    traMADol (ULTRAM) 50 MG tablet     Sig: Take 1 tablet by mouth every 4 hours as needed for Pain for up to 3 days. Intended supply: 5 days. Take lowest dose possible to manage pain     Dispense:  10 tablet     Refill:  0         CONSULTS:  None    PROCEDURES:  None      Low back pain radiating down the left leg. No injury. On exam, tenderness across lower back, worse on left. She has normal strength and sensation. Pulses intact. Ambulatory to bathroom   Will check urine preg. Suspect sciatica. Will treat with toradol and norflex if preg is neg. Pt states pain is improving. Will dc home with flexeril, motrin, tramadol. Referred to ortho. Recommend heating pads, stretching. Strict return precautions discussed at bedside. She is agreeable with plan. Discussed results and plan with the pt. They expressed appropriate understanding. Pt given close follow up, supportive care instructions and strict return instructions at the bedside. Patient instructed to return to the emergency room if symptoms worsen, return, or any other concern right away which is agreed. Follow up with PCP in 2-3 days for re-evaluation. The patient presents with low back pain without signs of spinal cord compression, cauda equina syndrome, infection, aneurysm or other serious etiology. The patient is neurologically intact. Given the extremely low risk of these diagnoses further testing and evaluation for these possibilities does not appear to be indicated at this time. The patient has been instructed to return if the symptoms worsen or change in any way. The care is provided during an unprecedented national emergency due to the novel coronavirus, COVID-19. FINAL IMPRESSION      1.  Acute bilateral low back pain with left-sided sciatica          DISPOSITION/PLAN   DISPOSITION Decision To Discharge    PATIENT REFERRED TO:  Shon Garcia MD  formerly [facility] [address] [address] [facility] [address] [facility]  [address]  [address], [address] [place] [address]  [phone]    Call         DISCHARGE MEDICATIONS:  New Prescriptions    CYCLOBENZAPRINE (FLEXERIL) 10 MG TABLET    Take 1 tablet by mouth nightly as needed for Muscle spasms    IBUPROFEN (ADVIL;MOTRIN) 800 MG TABLET    Take 1 tablet by mouth every 8 hours as needed for Pain    TRAMADOL (ULTRAM) 50 MG TABLET    Take 1 tablet by mouth every 4 hours as needed for Pain for up to 3 days. Intended supply: 5 days.  Take lowest dose possible to manage pain       (Please note that portions of this note were completed with a voice recognition program.  Efforts were made to edit the dictations but occasionally words are mis-transcribed.)    Lowell Juarez [address] Sonja Guevara PA-C  05/02/22 7480

## 2022-07-28 ENCOUNTER — HOSPITAL ENCOUNTER (OUTPATIENT)
Age: 22
Discharge: HOME | End: 2022-07-28
Payer: COMMERCIAL

## 2022-07-28 LAB
ABSOLUTE BANDS #: 0.11 K/UL (ref 0–1)
ABSOLUTE EOS #: 0.76 K/UL (ref 0–0.4)
ABSOLUTE LYMPH #: 2.51 K/UL (ref 1–4.8)
ABSOLUTE MONO #: 0.76 K/UL (ref 0.1–1.3)
ALBUMIN SERPL-MCNC: 4.4 G/DL (ref 3.5–5.2)
ALP BLD-CCNC: 59 U/L (ref 35–104)
ALT SERPL-CCNC: 18 U/L (ref 5–33)
ANION GAP SERPL CALCULATED.3IONS-SCNC: 12 MMOL/L (ref 9–17)
AST SERPL-CCNC: 15 U/L
BANDS: 1 % (ref 0–10)
BASOPHILS # BLD: 0 % (ref 0–2)
BASOPHILS ABSOLUTE: 0 K/UL (ref 0–0.2)
BILIRUB SERPL-MCNC: 0.75 MG/DL (ref 0.3–1.2)
BUN BLDV-MCNC: 7 MG/DL (ref 6–20)
CALCIUM SERPL-MCNC: 8.7 MG/DL (ref 8.6–10.4)
CHLORIDE BLD-SCNC: 105 MMOL/L (ref 98–107)
CHOLESTEROL/HDL RATIO: 4.3
CHOLESTEROL: 190 MG/DL
CO2: 20 MMOL/L (ref 20–31)
CREAT SERPL-MCNC: 0.7 MG/DL (ref 0.5–0.9)
EOSINOPHILS RELATIVE PERCENT: 7 % (ref 0–4)
ESTIMATED AVERAGE GLUCOSE: 100 MG/DL
GFR AFRICAN AMERICAN: >60 ML/MIN
GFR NON-AFRICAN AMERICAN: >60 ML/MIN
GFR SERPL CREATININE-BSD FRML MDRD: NORMAL ML/MIN/{1.73_M2}
GLUCOSE BLD-MCNC: 92 MG/DL (ref 70–99)
HBA1C MFR BLD: 5.1 % (ref 4–6)
HCT VFR BLD CALC: 43.4 % (ref 36–46)
HDLC SERPL-MCNC: 44 MG/DL
HEMOGLOBIN: 14.7 G/DL (ref 12–16)
LDL CHOLESTEROL: 132 MG/DL (ref 0–130)
LYMPHOCYTES # BLD: 23 % (ref 24–44)
MAGNESIUM: 1.9 MG/DL (ref 1.6–2.6)
MCH RBC QN AUTO: 31.3 PG (ref 26–34)
MCHC RBC AUTO-ENTMCNC: 33.9 G/DL (ref 31–37)
MCV RBC AUTO: 92.2 FL (ref 80–100)
MONOCYTES # BLD: 7 % (ref 1–7)
MORPHOLOGY: NORMAL
PDW BLD-RTO: 13 % (ref 11.5–14.9)
PLATELET # BLD: 316 K/UL (ref 150–450)
PMV BLD AUTO: 8 FL (ref 6–12)
POTASSIUM SERPL-SCNC: 3.9 MMOL/L (ref 3.7–5.3)
RBC # BLD: 4.71 M/UL (ref 4–5.2)
SEG NEUTROPHILS: 62 % (ref 36–66)
SEGMENTED NEUTROPHILS ABSOLUTE COUNT: 6.76 K/UL (ref 1.3–9.1)
SODIUM BLD-SCNC: 137 MMOL/L (ref 135–144)
T3 FREE: 3.47 PG/ML (ref 2.02–4.43)
THYROXINE, FREE: 1.32 NG/DL (ref 0.93–1.7)
TOTAL PROTEIN: 7.2 G/DL (ref 6.4–8.3)
TRIGL SERPL-MCNC: 68 MG/DL
TSH SERPL DL<=0.05 MIU/L-ACNC: 1.42 UIU/ML (ref 0.3–5)
VITAMIN D 25-HYDROXY: 15.3 NG/ML
WBC # BLD: 10.9 K/UL (ref 3.5–11)

## 2022-07-28 PROCEDURE — 80061 LIPID PANEL: CPT

## 2022-07-28 PROCEDURE — 83036 HEMOGLOBIN GLYCOSYLATED A1C: CPT

## 2022-07-28 PROCEDURE — 36415 COLL VENOUS BLD VENIPUNCTURE: CPT

## 2022-07-28 PROCEDURE — 84481 FREE ASSAY (FT-3): CPT

## 2022-07-28 PROCEDURE — 84439 ASSAY OF FREE THYROXINE: CPT

## 2022-07-28 PROCEDURE — 84443 ASSAY THYROID STIM HORMONE: CPT

## 2022-07-28 PROCEDURE — 85025 COMPLETE CBC W/AUTO DIFF WBC: CPT

## 2022-07-28 PROCEDURE — 83735 ASSAY OF MAGNESIUM: CPT

## 2022-07-28 PROCEDURE — 82306 VITAMIN D 25 HYDROXY: CPT

## 2022-07-28 PROCEDURE — 80053 COMPREHEN METABOLIC PANEL: CPT

## 2022-09-29 ENCOUNTER — TELEPHONE (OUTPATIENT)
Dept: OBGYN CLINIC | Age: 22
End: 2022-09-29

## 2022-10-12 ENCOUNTER — HOSPITAL ENCOUNTER (OUTPATIENT)
Dept: PREADMISSION TESTING | Age: 22
Setting detail: OUTPATIENT SURGERY
Discharge: HOME | End: 2022-10-16
Payer: COMMERCIAL

## 2022-10-12 ENCOUNTER — OFFICE VISIT (OUTPATIENT)
Dept: GASTROENTEROLOGY | Age: 22
End: 2022-10-12
Payer: COMMERCIAL

## 2022-10-12 VITALS
HEART RATE: 76 BPM | SYSTOLIC BLOOD PRESSURE: 111 MMHG | BODY MASS INDEX: 37.89 KG/M2 | DIASTOLIC BLOOD PRESSURE: 75 MMHG | HEIGHT: 68 IN | WEIGHT: 250 LBS

## 2022-10-12 VITALS — WEIGHT: 230 LBS | HEIGHT: 68 IN | BODY MASS INDEX: 34.86 KG/M2

## 2022-10-12 DIAGNOSIS — R10.13 EPIGASTRIC PAIN: ICD-10-CM

## 2022-10-12 DIAGNOSIS — R79.89 ELEVATED LFTS: Primary | ICD-10-CM

## 2022-10-12 DIAGNOSIS — R11.2 NAUSEA AND VOMITING, UNSPECIFIED VOMITING TYPE: ICD-10-CM

## 2022-10-12 DIAGNOSIS — K92.0 COFFEE GROUND EMESIS: ICD-10-CM

## 2022-10-12 PROCEDURE — APPSS45 APP SPLIT SHARED TIME 31-45 MINUTES: Performed by: NURSE PRACTITIONER

## 2022-10-12 PROCEDURE — 99214 OFFICE O/P EST MOD 30 MIN: CPT | Performed by: INTERNAL MEDICINE

## 2022-10-12 RX ORDER — ERGOCALCIFEROL (VITAMIN D2) 1250 MCG
50000 CAPSULE ORAL WEEKLY
COMMUNITY
Start: 2022-08-08

## 2022-10-12 RX ORDER — FAMOTIDINE 20 MG/1
20 TABLET, FILM COATED ORAL 2 TIMES DAILY
COMMUNITY

## 2022-10-12 ASSESSMENT — ENCOUNTER SYMPTOMS
ALLERGIC/IMMUNOLOGIC NEGATIVE: 1
EYES NEGATIVE: 1
NAUSEA: 1
RESPIRATORY NEGATIVE: 1
ABDOMINAL DISTENTION: 1
ABDOMINAL PAIN: 1
CONSTIPATION: 1

## 2022-10-12 NOTE — PROGRESS NOTES
GI OFFICE FOLLOW UP    INTERVAL HISTORY:   No referring provider defined for this encounter. HISTORY OF PRESENT ILLNESS:     Patient being seen for acute onset nausea, vomiting, questionable coffee-ground emesis. Has hx of IBS. Hx of cannabis hyperemesis. Patient reports she has been taking ibuprofen for the last 6 months for back pain. On October 3rd she reports acute onset nausea, vomiting with questionable hematemesis. She was evaluated in ED. Labs and imaging unremarkable  Patient was d/c'd with order for Pepcid. Following this she is doing well. No recurrent nausea, vomiting at present time. No epigastric pain. Bowel movements are satisfactory. Denies any melena, hematochezia    Patient has hx of mild elevation LFTs. In 2019 she had significant elevation of LFTs for which she was advised labs, US. These were not done. At her recent hospitalization her transaminases were normal.    Patient continues to vape and smoke marijuana daily. Past Medical,Family, and Social History reviewed and does contribute to the patient presenting condition. Patient's PMH/PSH,SH,PSYCH Hx, MEDs, ALLERGIES, and ROS were all reviewed and updated in the appropriate sections.  Yes      PAST MEDICAL HISTORY:  Past Medical History:   Diagnosis Date    PCOS (polycystic ovarian syndrome)     Pre-diabetes     Thyroid disease        Past Surgical History:   Procedure Laterality Date    TONSILLECTOMY AND ADENOIDECTOMY  06/2016    UPPER GASTROINTESTINAL ENDOSCOPY N/A 9/8/2020    EGD BIOPSY AND PHOTOS performed by Tim Dobbins MD at [address]:    Current Outpatient Medications:     ergocalciferol (ERGOCALCIFEROL) 1.25 MG (19410 UT) capsule, Take 50,000 Units by mouth once a week, Disp: , Rfl:     famotidine (PEPCID) 20 MG tablet, Take 20 mg by mouth 2 times daily, Disp: , Rfl:     ZAFEMY 150-35 MCG/24HR, APPLY 1 PATCH TOPICALLY ONCE A WEEK .   PLACE  A  NEW  PATCH  ON  THE  SKIN  ON  THE  SAME  DAY  EVERY  WEEK, Disp: 3 patch, Rfl: 5    ibuprofen (ADVIL;MOTRIN) 800 MG tablet, Take 1 tablet by mouth every 8 hours as needed for Pain (Patient not taking: Reported on 10/12/2022), Disp: 20 tablet, Rfl: 0    metFORMIN (GLUCOPHAGE XR) 750 MG extended release tablet, Take 1 tablet by mouth daily (with breakfast) (Patient not taking: Reported on 10/12/2022), Disp: 90 tablet, Rfl: 1    thyroid (ARMOUR) 60 MG tablet, Take 1 tablet by mouth daily (Patient not taking: Reported on 10/12/2022), Disp: 30 tablet, Rfl: 1    albuterol sulfate HFA (PROAIR HFA) 108 (90 Base) MCG/ACT inhaler, Inhale 2 puffs into the lungs every 4 hours as needed for Wheezing (Patient not taking: Reported on 10/12/2022), Disp: 18 g, Rfl: 3    omeprazole (PRILOSEC) 20 MG delayed release capsule, TAKE 1 CAPSULE BY MOUTH ONCE DAILY IN THE MORNING BEFORE BREAKFAST (Patient not taking: Reported on 10/12/2022), Disp: 90 capsule, Rfl: 0    Cetirizine HCl (ZYRTEC PO), Take by mouth (Patient not taking: Reported on 10/12/2022), Disp: , Rfl:     ALLERGIES:   Allergies   Allergen Reactions    Zofran [Ondansetron]      Increase in nausea       FAMILY HISTORY:       Problem Relation Age of Onset    Diabetes Father     Sleep Apnea Father     Hypertension Father     No Known Problems Mother     Breast Cancer Paternal Grandmother          SOCIAL HISTORY:   Social History     Socioeconomic History    Marital status: Single     Spouse name: Not on file    Number of children: Not on file    Years of education: Not on file    Highest education level: Not on file   Occupational History    Not on file   Tobacco Use    Smoking status: Former     Types: Cigarettes     Quit date: 2020     Years since quittin.6    Smokeless tobacco: Never   Vaping Use    Vaping Use: Every day    Last attempt to quit: 2020    Substances: Nicotine Devices: Disposable   Substance and Sexual Activity    Alcohol use: Yes     Comment: socially    Drug use: Not Currently     Types: Marijuana Kenard Snooks)     Comment: last time 08/20    Sexual activity: Yes     Partners: Male   Other Topics Concern    Not on file   Social History Narrative    Not on file     Social Determinants of Health     Financial Resource Strain: Not on file   Food Insecurity: Not on file   Transportation Needs: Not on file   Physical Activity: Not on file   Stress: Not on file   Social Connections: Not on file   Intimate Partner Violence: Not on file   Housing Stability: Not on file         REVIEW OF SYSTEMS:         Review of Systems   Constitutional:  Positive for appetite change and unexpected weight change. Eyes: Negative. Respiratory: Negative. Cardiovascular: Negative. Gastrointestinal:  Positive for abdominal distention, abdominal pain, constipation and nausea. Endocrine: Negative. Genitourinary: Negative. Musculoskeletal: Negative. Skin: Negative. Allergic/Immunologic: Negative. Neurological:  Positive for headaches. Hematological: Negative. Psychiatric/Behavioral: Negative. PHYSICAL EXAMINATION:     Vital signs reviewed per the nursing documentation. /75   Pulse 76   Ht 5' 8\" (1.727 m)   Wt 250 lb (113.4 kg)   BMI 38.01 kg/m²   Body mass index is 38.01 kg/m². Physical Exam  Constitutional:       Appearance: Normal appearance. Eyes:      General: No scleral icterus. Pupils: Pupils are equal, round, and reactive to light. Cardiovascular:      Rate and Rhythm: Normal rate and regular rhythm. Heart sounds: Normal heart sounds. Pulmonary:      Effort: Pulmonary effort is normal.      Breath sounds: Normal breath sounds. Abdominal:      General: Bowel sounds are normal. There is no distension. Palpations: Abdomen is soft. There is no mass. Tenderness: There is no abdominal tenderness. There is no guarding. Skin:     General: Skin is warm and dry. Coloration: Skin is not jaundiced. Neurological:      Mental Status: She is alert and oriented to person, place, and time. Mental status is at baseline. LABORATORY DATA: Reviewed  Lab Results   Component Value Date    WBC 10.9 07/28/2022    HGB 14.7 07/28/2022    HCT 43.4 07/28/2022    MCV 92.2 07/28/2022     07/28/2022     07/28/2022    K 3.9 07/28/2022     07/28/2022    CO2 20 07/28/2022    BUN 7 07/28/2022    CREATININE 0.70 07/28/2022    LABALBU 4.4 07/28/2022    BILITOT 0.75 07/28/2022    ALKPHOS 59 07/28/2022    AST 15 07/28/2022    ALT 18 07/28/2022         Lab Results   Component Value Date    RBC 4.71 07/28/2022    HGB 14.7 07/28/2022    MCV 92.2 07/28/2022    MCH 31.3 07/28/2022    MCHC 33.9 07/28/2022    RDW 13.0 07/28/2022    MPV 8.0 07/28/2022    BASOPCT 0 07/28/2022    LYMPHSABS 2.51 07/28/2022    MONOSABS 0.76 07/28/2022    NEUTROABS 6.76 07/28/2022    EOSABS 0.76 (H) 07/28/2022    BASOSABS 0.00 07/28/2022         DIAGNOSTIC TESTING:     No results found. Assessment  1. Elevated LFTs    2. Epigastric pain    3. Nausea and vomiting, unspecified vomiting type    4. Coffee ground emesis        Plan    At present patient is stable  No recurrent nausea, vomiting, coffee-ground emesis. Will arrange EGD to evaluate ulcer, gastritis, esophagitis, etc.  To continue pepcid and antireflux measures. Will obtain RUQ US to evaluate epigastric pain as well as hx of elevated LFTs. Patient was advised to abstain from vaping and marijuana.     The Endoscopic procedure was explained to the patient in detail  The prep and NPO were explained  All the Risks, Benefits, and Alternatives were explained  Risk of Bleeding, Perforation and Cardio Respiratory risks were explained  her questions were answered  The procedure has been scheduled with the  in the office  Patient was asked to give us a call for any questions  The patient has verbalized understanding and agreement to this plan. The patient was counseled at length about the risks of brice Covid-19 during their perioperative period and any recovery window from their procedure. The patient was made aware that brice Covid-19  may worsen their prognosis for recovering from their procedure  and lend to a higher morbidity and/or mortality risk. All material risks, benefits, and reasonable alternatives including postponing the procedure were discussed. The patient does wish to proceed with the procedure at this time. GI attending physician note. Patient seen with APRN     I independently performed an evaluation on Daryl Britton. I have reviewed the above documentation completed by the Nurse Practitioner and agree with the history, examination, and management plan. Recommendations discussed. Patient seen along with APRN. History discussed with the patient. Examination nonspecific except that patient is overweight. Recommendations discussed with APRN and patient. At present liver tests are normal.  Advised to have EGD to evaluate esophageal pathology and also gastric pathology such as NSAID induced ulcer disease gastritis etc.  After discussion patient understood and verbalized consent. Thank you for allowing me to participate in the care of Ms. Vance. For any further questions please do not hesitate to contact me. I have reviewed and agree with the ROS entered by the MA/LPN. Note is dictated utilizing voice recognition software. Unfortunately this leads to occasional typographical errors.  Please contact our office if you have any questions        Alicia Matias MD,FACP, [facility]  Board Certified in Gastroenterology and [address] [place] [address] Gastroenterology  Office #: [phone]

## 2022-10-13 ENCOUNTER — ANESTHESIA EVENT (OUTPATIENT)
Dept: ENDOSCOPY | Age: 22
End: 2022-10-13
Payer: COMMERCIAL

## 2022-10-14 ENCOUNTER — ANESTHESIA (OUTPATIENT)
Dept: ENDOSCOPY | Age: 22
End: 2022-10-14
Payer: COMMERCIAL

## 2022-10-14 ENCOUNTER — HOSPITAL ENCOUNTER (OUTPATIENT)
Age: 22
Setting detail: OUTPATIENT SURGERY
Discharge: HOME | End: 2022-10-14
Attending: INTERNAL MEDICINE | Admitting: INTERNAL MEDICINE
Payer: COMMERCIAL

## 2022-10-14 VITALS
TEMPERATURE: 97.5 F | RESPIRATION RATE: 14 BRPM | SYSTOLIC BLOOD PRESSURE: 104 MMHG | BODY MASS INDEX: 34.86 KG/M2 | HEIGHT: 68 IN | OXYGEN SATURATION: 95 % | DIASTOLIC BLOOD PRESSURE: 76 MMHG | HEART RATE: 61 BPM | WEIGHT: 230 LBS

## 2022-10-14 LAB — HCG, PREGNANCY URINE (POC): NEGATIVE

## 2022-10-14 PROCEDURE — 3700000000 HC ANESTHESIA ATTENDED CARE: Performed by: INTERNAL MEDICINE

## 2022-10-14 PROCEDURE — 3700000001 HC ADD 15 MINUTES (ANESTHESIA): Performed by: INTERNAL MEDICINE

## 2022-10-14 PROCEDURE — 3609017100 HC EGD: Performed by: INTERNAL MEDICINE

## 2022-10-14 PROCEDURE — 7100000010 HC PHASE II RECOVERY - FIRST 15 MIN: Performed by: INTERNAL MEDICINE

## 2022-10-14 PROCEDURE — 2580000003 HC RX 258: Performed by: ANESTHESIOLOGY

## 2022-10-14 PROCEDURE — 81025 URINE PREGNANCY TEST: CPT

## 2022-10-14 PROCEDURE — 6360000002 HC RX W HCPCS: Performed by: NURSE ANESTHETIST, CERTIFIED REGISTERED

## 2022-10-14 PROCEDURE — 2709999900 HC NON-CHARGEABLE SUPPLY: Performed by: INTERNAL MEDICINE

## 2022-10-14 PROCEDURE — 43235 EGD DIAGNOSTIC BRUSH WASH: CPT | Performed by: INTERNAL MEDICINE

## 2022-10-14 PROCEDURE — 7100000011 HC PHASE II RECOVERY - ADDTL 15 MIN: Performed by: INTERNAL MEDICINE

## 2022-10-14 RX ORDER — LIDOCAINE HYDROCHLORIDE 10 MG/ML
1 INJECTION, SOLUTION EPIDURAL; INFILTRATION; INTRACAUDAL; PERINEURAL
Status: DISCONTINUED | OUTPATIENT
Start: 2022-10-14 | End: 2022-10-14 | Stop reason: HOSPADM

## 2022-10-14 RX ORDER — PROPOFOL 10 MG/ML
INJECTION, EMULSION INTRAVENOUS PRN
Status: DISCONTINUED | OUTPATIENT
Start: 2022-10-14 | End: 2022-10-14 | Stop reason: SDUPTHER

## 2022-10-14 RX ORDER — MIDAZOLAM HYDROCHLORIDE 1 MG/ML
INJECTION INTRAMUSCULAR; INTRAVENOUS PRN
Status: DISCONTINUED | OUTPATIENT
Start: 2022-10-14 | End: 2022-10-14 | Stop reason: SDUPTHER

## 2022-10-14 RX ORDER — SODIUM CHLORIDE, SODIUM LACTATE, POTASSIUM CHLORIDE, CALCIUM CHLORIDE 600; 310; 30; 20 MG/100ML; MG/100ML; MG/100ML; MG/100ML
INJECTION, SOLUTION INTRAVENOUS CONTINUOUS
Status: DISCONTINUED | OUTPATIENT
Start: 2022-10-14 | End: 2022-10-14 | Stop reason: HOSPADM

## 2022-10-14 RX ADMIN — SODIUM CHLORIDE, POTASSIUM CHLORIDE, SODIUM LACTATE AND CALCIUM CHLORIDE: 600; 310; 30; 20 INJECTION, SOLUTION INTRAVENOUS at 08:34

## 2022-10-14 RX ADMIN — MIDAZOLAM 2 MG: 1 INJECTION INTRAMUSCULAR; INTRAVENOUS at 09:50

## 2022-10-14 RX ADMIN — PROPOFOL 140 MG: 10 INJECTION, EMULSION INTRAVENOUS at 09:51

## 2022-10-14 ASSESSMENT — PAIN - FUNCTIONAL ASSESSMENT: PAIN_FUNCTIONAL_ASSESSMENT: 0-10

## 2022-10-14 ASSESSMENT — ENCOUNTER SYMPTOMS
CHEST TIGHTNESS: 0
SORE THROAT: 0
APNEA: 0
SINUS PAIN: 0
BACK PAIN: 0
RHINORRHEA: 0
COUGH: 0
TROUBLE SWALLOWING: 0
WHEEZING: 0
SINUS PRESSURE: 0
SHORTNESS OF BREATH: 0
STRIDOR: 0
SHORTNESS OF BREATH: 0

## 2022-10-14 ASSESSMENT — LIFESTYLE VARIABLES: SMOKING_STATUS: 0

## 2022-10-14 NOTE — ANESTHESIA POSTPROCEDURE EVALUATION
POST- ANESTHESIA EVALUATION       Pt Name: Morgan Sloan  MRN: [id number]  YOB: 2000  Date of evaluation: 10/14/2022  Time:  11:56 AM      /76   Pulse 61   Temp 97.5 °F (36.4 °C) (Infrared)   Resp 14   Ht 5' 8\" (1.727 m)   Wt 230 lb (104.3 kg)   LMP 10/08/2022 (Exact Date)   SpO2 95%   BMI 34.97 kg/m²      Consciousness Level  Awake  Cardiopulmonary Status  Stable  Pain Adequately Treated YES  Nausea / Vomiting  NO  Adequate Hydration  YES  Anesthesia Related Complications NONE      Electronically signed by Clarita Rubio MD on 10/14/2022 at 11:56 AM       Department of Anesthesiology  Postprocedure Note    Patient: Morgan Sloan  MRN: [id number]  YOB: 2000  Date of evaluation: 10/14/2022      Procedure Summary     Date: 10/14/22 Room / Location: [place] [address] / [facility]    Anesthesia Start: 1454 Anesthesia Stop: 1006    Procedure: EGD ESOPHAGOGASTRODUODENOSCOPY (Esophagus) Diagnosis:       Epigastric pain      Nausea and vomiting, unspecified vomiting type      (Epigastric pain [R10.13])      (Nausea and vomiting, unspecified vomiting type [R11.2])    Surgeons: Maureen Wong MD Responsible Provider: Clarita Rubio MD    Anesthesia Type: general ASA Status: 2          Anesthesia Type: No value filed.     Jay Phase I:      Jay Phase II: Jay Score: 10      Anesthesia Post Evaluation

## 2022-10-14 NOTE — H&P
HISTORY and [address] [address]       NAME:  Cyril Morris  MRN: [id number]   YOB: 2000   Date: 10/14/2022   Age: 25 y.o. Gender: female       COMPLAINT AND PRESENT HISTORY:   Cyril Morris  is a 25 y.o. female presenting today for EGD BIOPSY as r/t Epigastric pain, Nausea and vomiting, unspecified vomiting type. Pt states she has been experiencing N/V/anorexia and epigastric pain since the beginning of the month. She states she has modified her diet and has helped ease the pain. She states she went to the ED earlier in the month with hematemesis and was told she \"probably has a ulcer. \" She reports intermittent epigastric pain 7/10 in severity, pain worse when her stomach is empty and about 30 min post prandial. States pears and high carb foods helped alleviate pain. Pt states she has a back injury that she was treating fairly heavily with tylenol and ibuprofen. She denies any dysphagia, sore throat  or voice change. Pt has a PMHX significant for hypothyroid        NPO since midnight. Does report using marijuana this morning   Pt does not wear dentures. Pt denies any hx of MRSA infection  Pt not currently taking any blood thinners or anticoagulants  Pt denies any personal or FHx of complications with anesthesia. Pt denies any acute symptoms of illness at this time including no SOB, CP, fever, URI or UTI symptoms. RECENT IMAGING    No results found.      PAST MEDICAL HISTORY     Past Medical History:   Diagnosis Date    H/O bronchitis 2021    Hypothyroid     hx of, no medications at this time    PCOS (polycystic ovarian syndrome)     Pre-diabetes     Psoriasis        SURGICAL HISTORY       Past Surgical History:   Procedure Laterality Date    TONSILLECTOMY AND ADENOIDECTOMY  06/2016    UPPER GASTROINTESTINAL ENDOSCOPY N/A 9/8/2020    EGD BIOPSY AND PHOTOS performed by Juan Trammell MD at [address] [place] [address]       Family History   Problem Relation Age of Onset    Diabetes Father     Sleep Apnea Father     Hypertension Father     No Known Problems Mother     Breast Cancer Paternal Grandmother        SOCIAL HISTORY       Social History     Socioeconomic History    Marital status: Single   Tobacco Use    Smoking status: Former     Types: Cigarettes     Quit date: 2020     Years since quittin.6    Smokeless tobacco: Never   Vaping Use    Vaping Use: Every day    Last attempt to quit: 2020    Substances: Nicotine    Devices: Disposable   Substance and Sexual Activity    Alcohol use: Yes     Comment: socially    Drug use: Not Currently     Types: Marijuana Dietra Due)     Comment: uses marijuana every day    Sexual activity: Yes     Partners: Male           REVIEW OF SYSTEMS      Allergies   Allergen Reactions    Zofran [Ondansetron]      Increase in nausea       No current facility-administered medications on file prior to encounter. Current Outpatient Medications on File Prior to Encounter   Medication Sig Dispense Refill    ergocalciferol (ERGOCALCIFEROL) 1.25 MG (66255 UT) capsule Take 50,000 Units by mouth once a week      famotidine (PEPCID) 20 MG tablet Take 20 mg by mouth 2 times daily      ZAFEMY 150-35 MCG/24HR APPLY 1 PATCH TOPICALLY ONCE A WEEK . PLACE  A  NEW  PATCH  ON  THE  SKIN  ON  THE  SAME  DAY  EVERY  WEEK 3 patch 5    albuterol sulfate HFA (PROAIR HFA) 108 (90 Base) MCG/ACT inhaler Inhale 2 puffs into the lungs every 4 hours as needed for Wheezing 18 g 3    Cetirizine HCl (ZYRTEC PO) Take 1 tablet by mouth daily as needed          Review of Systems   Constitutional:  Negative for chills, diaphoresis, fatigue and fever. HENT:  Negative for congestion, dental problem, ear pain, postnasal drip, rhinorrhea, sinus pressure, sinus pain, sore throat and trouble swallowing. Respiratory:  Negative for apnea, cough, chest tightness, shortness of breath and wheezing. Cardiovascular:  Negative for chest pain, palpitations and leg swelling. Gastrointestinal:         SEE HPI    Genitourinary:  Negative for dysuria, flank pain, frequency and hematuria. Musculoskeletal:  Negative for back pain, joint swelling and myalgias. Skin:  Negative for rash and wound. Neurological:  Negative for dizziness, weakness, numbness and headaches. Hematological:  Does not bruise/bleed easily. Psychiatric/Behavioral:  Negative for agitation and confusion. The patient is not nervous/anxious. See HPI    GENERAL PHYSICAL EXAM:     Vitals: See nurse flow sheet     Physical Exam  Constitutional:       General: She is not in acute distress. Appearance: Normal appearance. She is well-developed. She is obese. She is not ill-appearing or toxic-appearing. HENT:      Head: Normocephalic and atraumatic. Mouth/Throat:      Mouth: Mucous membranes are dry. Pharynx: Oropharynx is clear. No oropharyngeal exudate or posterior oropharyngeal erythema. Eyes:      Extraocular Movements: Extraocular movements intact. Conjunctiva/sclera: Conjunctivae normal.      Pupils: Pupils are equal, round, and reactive to light. Cardiovascular:      Rate and Rhythm: Normal rate and regular rhythm. Pulses: Normal pulses. Heart sounds: Normal heart sounds. No murmur heard. No friction rub. No gallop. Pulmonary:      Effort: Pulmonary effort is normal.      Breath sounds: Normal breath sounds. No wheezing. Abdominal:      General: Bowel sounds are normal. There is no distension. Palpations: Abdomen is soft. Tenderness: There is no abdominal tenderness. There is no guarding or rebound. Comments: Hyperactive bs. Musculoskeletal:         General: No swelling. Normal range of motion. Cervical back: Normal range of motion and neck supple. No rigidity or tenderness. Right lower leg: No edema. Left lower leg: No edema. Skin:     General: Skin is warm and dry. Findings: No erythema.    Neurological: General: No focal deficit present. Mental Status: She is alert and oriented to person, place, and time. Mental status is at baseline. Sensory: No sensory deficit. Psychiatric:         Mood and Affect: Mood normal.         Behavior: Behavior normal.         Thought Content:  Thought content normal.         Judgment: Judgment normal.                                                                                       PROVISIONAL DIAGNOSES / SURGERY:      EGD BIOPSY     Epigastric pain [R10.13]  Nausea and vomiting, unspecified vomiting type [R11.2]    Patient Active Problem List    Diagnosis Date Noted    Irritable bowel syndrome with both constipation and diarrhea 08/14/2020    PCOS (polycystic ovarian syndrome) 04/03/2020    Amenorrhea 03/04/2020    Pelvic pain in female 03/04/2020               HILDA Pineda - CNP on 10/14/2022 at 7:53 AM

## 2022-10-14 NOTE — DISCHARGE INSTRUCTIONS
your caregiver. SEEK IMMEDIATE MEDICAL CARE IF:   You are not feeling normal or behaving normally after 24 hours. You have persistent nausea and vomiting. You are unable to drink fluids or eat food. You have difficulty urinating. You have difficulty breathing or speaking. You have blue or gray skin. There is difficulty waking or you cannot be woken up. You have heavy bleeding, redness, or a lot of swelling where the sedative or anesthesia entered your skin (intravenous site). You have a rash. MAKE SURE YOU:  Understand these instructions. Will watch your condition. Will get help right away if you are not doing well or get worse. Document Released: 12/18/2006 Document Revised: 06/18/2013 Document Reviewed: 04/17/2013  BELLA HERNANDEZ [facility] Patient Information ©2013 [employer].

## 2022-10-14 NOTE — OP NOTE
ESOPHAGOGASTRODUODENOSCOPY   ( EGD )  DATE OF PROCEDURE: 10/14/2022     SURGEON: Whit Amos MD    ASSISTANT: None    PREOPERATIVE DIAGNOSIS: Patient has a history of nausea vomiting, coffee colored emesis. Referred for EGD. POSTOPERATIVE DIAGNOSIS: No lesions seen up to the end of second part of the duodenum. OPERATION: Upper GI endoscopy     ANESTHESIA: MAC    ESTIMATED BLOOD LOSS: None    COMPLICATIONS: None. SPECIMENS:  Was Not Obtained    HISTORY: The patient is a 25y.o. year old female with history of above preop diagnosis. I recommended esophagogastroduodenoscopy with possible biopsy and I explained the risk, benefits, expected outcome, and alternatives to the procedure. Risks included but are not limited to bleeding, infection, respiratory distress, hypotension, and perforation of the esophagus, stomach, or duodenum. Patient understands and is in agreement. PROCEDURE: The patient was given IV conscious sedation. The patient's SPO2 remained above 90% throughout the procedure. Cetacaine spray given. Patient placed in left lateral position. Olympus  videogastroscope was inserted orally under vision into the esophagus without difficulty and advanced into the stomach then through the pylorus up to the second part of duodenum. Findings:    Retropharyngeal area was grossly normal appearing    Esophagus: normal.  No signs of peptic esophagitis or Lucero's mucosa seen. No hiatal hernia. Stomach:    Fundus and Cardia Examined in Retroflexed View: normal    Body: normal    Antrum: normal    Duodenum:     Descending: normal    Bulb: normal    While withdrawing the scope the above findings were verified and the scope was removed. The patient has tolerated the procedure without unusual events.          Recommendations/Plan:     F/U In Office as instructed  Discussed with the family                   Electronically signed by Whit Amos MD  on 10/14/2022 at 10:02 AM

## 2022-10-14 NOTE — ANESTHESIA PRE PROCEDURE
Department of Anesthesiology  Preprocedure Note       Name:  Janine Ferrell   Age:  25 y.o.  :  2000                                          MRN:  [id number]         Date:  10/14/2022      Surgeon: Osvaldo Beckford):  Krunla Landaverde MD    Procedure: Procedure(s):  EGD BIOPSY    Medications prior to admission:   Prior to Admission medications    Medication Sig Start Date End Date Taking? Authorizing Provider   ergocalciferol (ERGOCALCIFEROL) 1.25 MG (29574 UT) capsule Take 50,000 Units by mouth once a week 22   Historical Provider, MD   famotidine (PEPCID) 20 MG tablet Take 20 mg by mouth 2 times daily    Historical Provider, MD   ZAFEMY 150-35 MCG/24HR APPLY 1 PATCH TOPICALLY ONCE A WEEK . PLACE  A  NEW  PATCH  ON  THE  SKIN  ON  THE  SAME  DAY  EVERY  WEEK 3/21/22   HILDA Duarte CNP   albuterol sulfate HFA (PROAIR HFA) 108 (90 Base) MCG/ACT inhaler Inhale 2 puffs into the lungs every 4 hours as needed for Wheezing 10/27/21   HILDA Roman CNP   Cetirizine HCl (ZYRTEC PO) Take 1 tablet by mouth daily as needed    Historical Provider, MD       Current medications:    Current Facility-Administered Medications   Medication Dose Route Frequency Provider Last Rate Last Admin    lactated ringers infusion   IntraVENous Continuous Ray Eason MD        lidocaine PF 1 % injection 1 mL  1 mL IntraDERmal Once PRN Tien Alvarez MD           Allergies:     Allergies   Allergen Reactions    Zofran [Ondansetron]      Increase in nausea       Problem List:    Patient Active Problem List   Diagnosis Code    Amenorrhea N91.2    Pelvic pain in female R10.2    PCOS (polycystic ovarian syndrome) E28.2    Irritable bowel syndrome with both constipation and diarrhea K58.2       Past Medical History:        Diagnosis Date    H/O bronchitis     Hypothyroid     hx of, no medications at this time    PCOS (polycystic ovarian syndrome)     Pre-diabetes     Psoriasis        Past Surgical History: Procedure Laterality Date    TONSILLECTOMY AND ADENOIDECTOMY  2016    UPPER GASTROINTESTINAL ENDOSCOPY N/A 2020    EGD BIOPSY AND PHOTOS performed by Du Grissom MD at [facility]       Social History:    Social History     Tobacco Use    Smoking status: Former     Types: Cigarettes     Quit date: 2020     Years since quittin.6    Smokeless tobacco: Never   Substance Use Topics    Alcohol use: Yes     Comment: socially                                Counseling given: Not Answered      Vital Signs (Current): There were no vitals filed for this visit. BP Readings from Last 3 Encounters:   10/12/22 111/75   22 131/73   22 116/78       NPO Status:                                                                                 BMI:   Wt Readings from Last 3 Encounters:   10/12/22 230 lb (104.3 kg)   10/12/22 250 lb (113.4 kg)   22 250 lb (113.4 kg)     There is no height or weight on file to calculate BMI.    CBC:   Lab Results   Component Value Date/Time    WBC 10.9 2022 12:29 PM    RBC 4.71 2022 12:29 PM    HGB 14.7 2022 12:29 PM    HCT 43.4 2022 12:29 PM    MCV 92.2 2022 12:29 PM    RDW 13.0 2022 12:29 PM     2022 12:29 PM     LR    CMP:   Lab Results   Component Value Date/Time     2022 12:29 PM    K 3.9 2022 12:29 PM     2022 12:29 PM    CO2 20 2022 12:29 PM    BUN 7 2022 12:29 PM    CREATININE 0.70 2022 12:29 PM    GFRAA >60 2022 12:29 PM    LABGLOM >60 2022 12:29 PM    GLUCOSE 92 2022 12:29 PM    PROT 7.2 2022 12:29 PM    CALCIUM 8.7 2022 12:29 PM    BILITOT 0.75 2022 12:29 PM    ALKPHOS 59 2022 12:29 PM    AST 15 2022 12:29 PM    ALT 18 2022 12:29 PM       POC Tests: No results for input(s): POCGLU, POCNA, POCK, POCCL, POCBUN, POCHEMO, POCHCT in the last 72 hours.     Coags: No results found for: PROTIME, INR, APTT    HCG (If Applicable):   Lab Results   Component Value Date    PREGTESTUR NEGATIVE 05/02/2022    HCG NEGATIVE 09/08/2020        ABGs: No results found for: PHART, PO2ART, DXS1QZU, NCX7AOC, BEART, L4JSMNPM     Type & Screen (If Applicable):  No results found for: LABABO, LABRH    Drug/Infectious Status (If Applicable):  Lab Results   Component Value Date/Time    HEPCAB NONREACTIVE 02/23/2021 11:23 AM       COVID-19 Screening (If Applicable):   Lab Results   Component Value Date/Time    COVID19 Not Detected 10/27/2021 06:08 PM    COVID19 Not Detected 09/04/2020 08:06 PM           Anesthesia Evaluation  Patient summary reviewed and Nursing notes reviewed no history of anesthetic complications:   Airway: Mallampati: III  TM distance: >3 FB   Neck ROM: full  Mouth opening: > = 3 FB   Dental:          Pulmonary:Negative Pulmonary ROS and normal exam  breath sounds clear to auscultation      (-) pneumonia, COPD, asthma, shortness of breath, recent URI, sleep apnea, rhonchi, wheezes, rales, stridor, not a current smoker and no decreased breath sounds          Patient did not smoke on day of surgery.                  Cardiovascular:Negative CV ROS  Exercise tolerance: good (>4 METS),       (-) pacemaker, hypertension, valvular problems/murmurs, past MI, CAD, CABG/stent, dysrhythmias,  angina,  CHF, orthopnea, PND,  [place], murmur, weak pulses,  friction rub, systolic click, carotid bruit,  JVD, peripheral edema, no pulmonary hypertension and no hyperlipidemia    ECG reviewed  Rhythm: regular  Rate: normal           Beta Blocker:  Not on Beta Blocker         Neuro/Psych:   Negative Neuro/Psych ROS     (-) seizures, neuromuscular disease, TIA, CVA, headaches, psychiatric history and depression/anxiety            GI/Hepatic/Renal: Neg GI/Hepatic/Renal ROS       (-) hiatal hernia, GERD, PUD, hepatitis, liver disease, no renal disease, bowel prep and no morbid obesity       Endo/Other:    (+) hypothyroidism::., no malignancy/cancer. (-) diabetes mellitus, hyperthyroidism, blood dyscrasia, arthritis, no electrolyte abnormalities, no malignancy/cancer               Abdominal:             Vascular: negative vascular ROS. - PVD, DVT and PE. Other Findings:           Anesthesia Plan      general     ASA 2       Induction: intravenous. MIPS: Postoperative opioids intended and Prophylactic antiemetics administered. Anesthetic plan and risks discussed with patient. Plan discussed with CRNA.                     Laith Reid MD   10/14/2022

## 2022-10-21 ENCOUNTER — HOSPITAL ENCOUNTER (OUTPATIENT)
Dept: ULTRASOUND IMAGING | Age: 22
Discharge: HOME | End: 2022-10-23
Payer: COMMERCIAL

## 2022-10-21 DIAGNOSIS — R10.13 EPIGASTRIC PAIN: ICD-10-CM

## 2022-10-21 DIAGNOSIS — R79.89 ELEVATED LFTS: ICD-10-CM

## 2022-10-21 PROCEDURE — 76705 ECHO EXAM OF ABDOMEN: CPT

## 2022-10-31 ENCOUNTER — TELEPHONE (OUTPATIENT)
Dept: GASTROENTEROLOGY | Age: 22
End: 2022-10-31

## 2022-12-19 ENCOUNTER — OFFICE VISIT (OUTPATIENT)
Dept: OBGYN CLINIC | Age: 22
End: 2022-12-19
Payer: COMMERCIAL

## 2022-12-19 VITALS
HEIGHT: 68 IN | DIASTOLIC BLOOD PRESSURE: 70 MMHG | WEIGHT: 236 LBS | BODY MASS INDEX: 35.77 KG/M2 | SYSTOLIC BLOOD PRESSURE: 124 MMHG

## 2022-12-19 DIAGNOSIS — Z30.45 ENCOUNTER FOR SURVEILLANCE OF TRANSDERMAL PATCH HORMONAL CONTRACEPTIVE DEVICE: Primary | ICD-10-CM

## 2022-12-19 DIAGNOSIS — E28.2 PCO (POLYCYSTIC OVARIES): ICD-10-CM

## 2022-12-19 DIAGNOSIS — Z87.42 HISTORY OF PCOS: ICD-10-CM

## 2022-12-19 DIAGNOSIS — N91.2 AMENORRHEA: ICD-10-CM

## 2022-12-19 PROCEDURE — G8427 DOCREV CUR MEDS BY ELIG CLIN: HCPCS | Performed by: CLINICAL NURSE SPECIALIST

## 2022-12-19 PROCEDURE — 1036F TOBACCO NON-USER: CPT | Performed by: CLINICAL NURSE SPECIALIST

## 2022-12-19 PROCEDURE — 99213 OFFICE O/P EST LOW 20 MIN: CPT | Performed by: CLINICAL NURSE SPECIALIST

## 2022-12-19 PROCEDURE — G8484 FLU IMMUNIZE NO ADMIN: HCPCS | Performed by: CLINICAL NURSE SPECIALIST

## 2022-12-19 PROCEDURE — G8417 CALC BMI ABV UP PARAM F/U: HCPCS | Performed by: CLINICAL NURSE SPECIALIST

## 2022-12-19 RX ORDER — NORELGESTROMIN AND ETHINLY ESTRADIOL 150; 35 UG/D; UG/D
PATCH TRANSDERMAL
Qty: 3 PATCH | Refills: 8 | Status: SHIPPED | OUTPATIENT
Start: 2022-12-19

## 2022-12-19 RX ORDER — BUPROPION HYDROCHLORIDE 150 MG/1
150 TABLET, EXTENDED RELEASE ORAL 2 TIMES DAILY
COMMUNITY
Start: 2022-07-05

## 2022-12-19 SDOH — ECONOMIC STABILITY: FOOD INSECURITY: WITHIN THE PAST 12 MONTHS, YOU WORRIED THAT YOUR FOOD WOULD RUN OUT BEFORE YOU GOT MONEY TO BUY MORE.: NEVER TRUE

## 2022-12-19 SDOH — ECONOMIC STABILITY: FOOD INSECURITY: WITHIN THE PAST 12 MONTHS, THE FOOD YOU BOUGHT JUST DIDN'T LAST AND YOU DIDN'T HAVE MONEY TO GET MORE.: NEVER TRUE

## 2022-12-19 ASSESSMENT — ENCOUNTER SYMPTOMS
EYES NEGATIVE: 1
ALLERGIC/IMMUNOLOGIC NEGATIVE: 1
GASTROINTESTINAL NEGATIVE: 1
RESPIRATORY NEGATIVE: 1

## 2022-12-19 ASSESSMENT — SOCIAL DETERMINANTS OF HEALTH (SDOH): HOW HARD IS IT FOR YOU TO PAY FOR THE VERY BASICS LIKE FOOD, HOUSING, MEDICAL CARE, AND HEATING?: NOT VERY HARD

## 2022-12-19 NOTE — PROGRESS NOTES
Subjective:      Patient ID:  Lupe Cox is a 25 y.o. female who presents for   Chief Complaint   Patient presents with    6 Month Follow-Up     BC patch       HPI    Patient is a 26 yo female who presents for follow up on birth control patch. Patient reports that she is having once monthly menses and flow is moderate since starting the birth control patch. Patient reports some cramping that is tolerable. Patient would like to continue with the patch. Patient reports that her PCP stopped her metformin because her glucose level was WNL, A1C 5.1. Review of Systems   Constitutional:  Negative for chills and fever. HENT: Negative. Eyes: Negative. Respiratory: Negative. Cardiovascular: Negative. Gastrointestinal: Negative. Endocrine: Negative. Genitourinary:  Positive for dysuria. Negative for menstrual problem (once monthly menses described as moderate with some cramping) and vaginal discharge. Musculoskeletal: Negative. Skin: Negative. Allergic/Immunologic: Negative. Neurological: Negative. Hematological: Negative. Psychiatric/Behavioral: Negative. /70 (Site: Left Upper Arm, Position: Sitting, Cuff Size: Large Adult)   Ht 5' 8\" (1.727 m)   Wt 236 lb (107 kg)   BMI 35.88 kg/m²    No LMP recorded. (Menstrual status: Irregular periods).     Family History   Problem Relation Age of Onset    Diabetes Father     Sleep Apnea Father     Hypertension Father     No Known Problems Mother     Breast Cancer Paternal Grandmother       Past Medical History:   Diagnosis Date    H/O bronchitis 2021    Hypothyroid     hx of, no medications at this time    PCOS (polycystic ovarian syndrome)     Pre-diabetes     Psoriasis       Past Surgical History:   Procedure Laterality Date    TONSILLECTOMY AND ADENOIDECTOMY  06/2016    UPPER GASTROINTESTINAL ENDOSCOPY N/A 9/8/2020    EGD BIOPSY AND PHOTOS performed by Robert Gonzalez MD at [place] [address] N/A 10/14/2022    EGD ESOPHAGOGASTRODUODENOSCOPY performed by Ruben Junior MD at [address] [place] [address] History     Socioeconomic History    Marital status: Single     Spouse name: None    Number of children: None    Years of education: None    Highest education level: None   Tobacco Use    Smoking status: Former     Types: Cigarettes     Quit date: 2020     Years since quittin.8    Smokeless tobacco: Never   Vaping Use    Vaping Use: Every day    Last attempt to quit: 2020    Substances: Nicotine    Devices: Disposable   Substance and Sexual Activity    Alcohol use: Yes     Comment: socially    Drug use: Not Currently     Types: Marijuana Rory Spina)     Comment: uses marijuana every day- last use 10/14/22    Sexual activity: Yes     Partners: Male     Social Determinants of Health     Financial Resource Strain: Low Risk     Difficulty of Paying Living Expenses: Not very hard   Food Insecurity: No Food Insecurity    Worried About Running Out of Food in the Last Year: Never true    Ran Out of Food in the Last Year: Never true      Current Outpatient Medications   Medication Sig Dispense Refill    buPROPion (WELLBUTRIN SR) 150 MG extended release tablet Take 150 mg by mouth 2 times daily      norelgestromin-ethinyl estradiol (ZAFEMY) 150-35 MCG/24HR APPLY 1 PATCH TOPICALLY ONCE A WEEK . PLACE  A  NEW  PATCH  ON  THE  SKIN  ON  THE  SAME  DAY  EVERY  WEEK 3 patch 8    ergocalciferol (ERGOCALCIFEROL) 1.25 MG (85703 UT) capsule Take 50,000 Units by mouth once a week      famotidine (PEPCID) 20 MG tablet Take 20 mg by mouth 2 times daily      albuterol sulfate HFA (PROAIR HFA) 108 (90 Base) MCG/ACT inhaler Inhale 2 puffs into the lungs every 4 hours as needed for Wheezing 18 g 3    Cetirizine HCl (ZYRTEC PO) Take 1 tablet by mouth daily as needed       No current facility-administered medications for this visit. Objective:   Physical Exam  Vitals reviewed.    Constitutional:       Appearance: She is well-developed. HENT:      Head: Normocephalic and atraumatic. Cardiovascular:      Rate and Rhythm: Normal rate and regular rhythm. Pulmonary:      Effort: Pulmonary effort is normal.      Breath sounds: Normal breath sounds. Musculoskeletal:         General: Normal range of motion. Skin:     General: Skin is warm and dry. Neurological:      Mental Status: She is oriented to person, place, and time. Psychiatric:         Behavior: Behavior normal.         Thought Content: Thought content normal.         Judgment: Judgment normal.       Assessment:      Diagnosis Orders   1. Encounter for surveillance of transdermal patch hormonal contraceptive device        2. History of PCOS  Insulin, total    Glucose, Fasting      3. PCO (polycystic ovaries)  norelgestromin-ethinyl estradiol (ZAFEMY) 150-35 MCG/24HR      4. Amenorrhea                Plan:    Reviewed her labs from 2018 fasting insulin was 49   Will repeat fasting insulin level and if elevated will restart glucophage. Return for as needed. Patient was seen with total face to face time of 25 minutes. More than 50% of this visit was on counseling andeducation regarding the problems listed below and her options. She was also counseled on her preventative health maintenance recommendations and follow-up.     Electronically signed by: Pat Govea CNP

## 2023-01-05 ENCOUNTER — OFFICE VISIT (OUTPATIENT)
Dept: OBGYN CLINIC | Age: 23
End: 2023-01-05
Payer: COMMERCIAL

## 2023-01-05 ENCOUNTER — HOSPITAL ENCOUNTER (OUTPATIENT)
Age: 23
Setting detail: SPECIMEN
Discharge: HOME | End: 2023-01-05

## 2023-01-05 VITALS
DIASTOLIC BLOOD PRESSURE: 66 MMHG | WEIGHT: 231 LBS | BODY MASS INDEX: 35.01 KG/M2 | HEIGHT: 68 IN | SYSTOLIC BLOOD PRESSURE: 112 MMHG | HEART RATE: 102 BPM

## 2023-01-05 DIAGNOSIS — R11.0 NAUSEA: ICD-10-CM

## 2023-01-05 DIAGNOSIS — Z32.01 POSITIVE PREGNANCY TEST: ICD-10-CM

## 2023-01-05 DIAGNOSIS — N92.6 MISSED MENSES: Primary | ICD-10-CM

## 2023-01-05 LAB
CONTROL: ABNORMAL
PREGNANCY TEST URINE, POC: POSITIVE

## 2023-01-05 PROCEDURE — G8484 FLU IMMUNIZE NO ADMIN: HCPCS | Performed by: CLINICAL NURSE SPECIALIST

## 2023-01-05 PROCEDURE — 99214 OFFICE O/P EST MOD 30 MIN: CPT | Performed by: CLINICAL NURSE SPECIALIST

## 2023-01-05 PROCEDURE — 36415 COLL VENOUS BLD VENIPUNCTURE: CPT | Performed by: CLINICAL NURSE SPECIALIST

## 2023-01-05 PROCEDURE — 81025 URINE PREGNANCY TEST: CPT | Performed by: CLINICAL NURSE SPECIALIST

## 2023-01-05 PROCEDURE — G8427 DOCREV CUR MEDS BY ELIG CLIN: HCPCS | Performed by: CLINICAL NURSE SPECIALIST

## 2023-01-05 PROCEDURE — G8417 CALC BMI ABV UP PARAM F/U: HCPCS | Performed by: CLINICAL NURSE SPECIALIST

## 2023-01-05 PROCEDURE — 1036F TOBACCO NON-USER: CPT | Performed by: CLINICAL NURSE SPECIALIST

## 2023-01-05 ASSESSMENT — PATIENT HEALTH QUESTIONNAIRE - PHQ9
SUM OF ALL RESPONSES TO PHQ QUESTIONS 1-9: 0
SUM OF ALL RESPONSES TO PHQ QUESTIONS 1-9: 0
1. LITTLE INTEREST OR PLEASURE IN DOING THINGS: 0
SUM OF ALL RESPONSES TO PHQ QUESTIONS 1-9: 0
SUM OF ALL RESPONSES TO PHQ9 QUESTIONS 1 & 2: 0
2. FEELING DOWN, DEPRESSED OR HOPELESS: 0
SUM OF ALL RESPONSES TO PHQ QUESTIONS 1-9: 0

## 2023-01-05 NOTE — PROGRESS NOTES
DATE OF VISIT:  23  PATIENT NAME:  Antoni Vora     YOB: 2000    SUBJECTIVE:    Chief Complaint:  Chief Complaint   Patient presents with    Amenorrhea       HPI:  Bethany Butler  is being seen today for missed menses. She reports a  positive pregnancy test on 21. This  is not a planned pregnancy. She is  accepting at this time. LMP: 22      Sure and definite: Yes     28 day cycle: Yes    She was on a contraceptive at the time of conception. She reports that she may have missed a week or 2 of her birth control in Nov.   Estimated weeks gestation today : 6w 0d  Tentative PRAMOD: 23    Relationship with FOB: involved    OBJECTIVE:    Vitals:    23 1444   BP: 112/66   Pulse: (!) 102   Weight: 231 lb (104.8 kg)   Height: 5' 8\" (1.727 m)     Body mass index is 35.12 kg/m². Patient's last menstrual period was 2022 (approximate). Mother's ethnicity:    Father's ethnicity:      She is complaining today of:   Pain: No  Cramping: Yes intermittently  Bleeding or spotting: No    Nausea: Yes  Vomiting: No    Breast enlargement/tenderness: Yes  Fatigue: Yes  Frequency of urination: Yes    Previous high risk obstetrical history: no  OB History    Para Term  AB Living   0 0 0 0 0 0   SAB IAB Ectopic Molar Multiple Live Births   0 0 0 0 0 0       ROS was done and is negative except as documented in HPI. History was reviewed as documented on Epic Navigator. ASSESSMENT:  Missed menses with + UCG  1. Missed menses    2. Positive pregnancy test    3.  Nausea        PLAN:  UCG was done and noted as positive  Prenatal vitamins were ordered: Yes  Ultrasound for dating and viability was ordered: Yes  1 hour glucola was ordered: Yes  She was instructed to call with any concerns or worsening of any symptoms  She will return for New OB intake visit  Discussed with patient increasing water intake, foods to limit or avoid, activity level, patient told to avoid josie litter. Patient was seen with total face to face time of 30 minutes. More than 50% of this visit was spent face to face coordinating plan of care and answering questions . She was also counseled on her preventative health maintenance recommendations and follow-up. Return for call in am for quant if 5000 or above will schedule dating US.     Electronically signed by: Christi Sousa CNP

## 2023-01-06 ENCOUNTER — TELEPHONE (OUTPATIENT)
Dept: OBGYN CLINIC | Age: 23
End: 2023-01-06

## 2023-01-06 DIAGNOSIS — N92.6 MISSED MENSES: ICD-10-CM

## 2023-01-06 DIAGNOSIS — N92.6 MISSED MENSES: Primary | ICD-10-CM

## 2023-01-06 LAB — HCG QUANTITATIVE: 1685 MIU/ML

## 2023-01-06 PROCEDURE — 36415 COLL VENOUS BLD VENIPUNCTURE: CPT | Performed by: CLINICAL NURSE SPECIALIST

## 2023-01-06 RX ORDER — VITAMIN A ACETATE, .BETA.-CAROTENE, ASCORBIC ACID, CHOLECALCIFEROL, .ALPHA.-TOCOPHEROL ACETATE, DL-, THIAMINE MONONITRATE, RIBOFLAVIN, NIACINAMIDE, PYRIDOXINE HYDROCHLORIDE, FOLIC ACID, CYANOCOBALAMIN, CALCIUM CARBONATE, FERROUS FUMARATE, ZINC OXIDE, AND CUPRIC OXIDE 2000; 2000; 120; 400; 22; 1.84; 3; 20; 10; 1; 12; 200; 27; 25; 2 [IU]/1; [IU]/1; MG/1; [IU]/1; MG/1; MG/1; MG/1; MG/1; MG/1; MG/1; UG/1; MG/1; MG/1; MG/1; MG/1
1 TABLET ORAL DAILY
Qty: 30 TABLET | Refills: 11 | Status: SHIPPED | OUTPATIENT
Start: 2023-01-06 | End: 2024-01-01

## 2023-01-06 NOTE — TELEPHONE ENCOUNTER
Patient called to say that her prenatal vitamins weren't sent to the pharmacy. Can we please have those sent over today.   Thanks

## 2023-01-07 ENCOUNTER — HOSPITAL ENCOUNTER (OUTPATIENT)
Age: 23
Discharge: HOME | End: 2023-01-07
Payer: COMMERCIAL

## 2023-01-07 DIAGNOSIS — N92.6 MISSED MENSES: ICD-10-CM

## 2023-01-07 LAB — HCG QUANTITATIVE: 3827 MIU/ML

## 2023-01-07 PROCEDURE — 84702 CHORIONIC GONADOTROPIN TEST: CPT

## 2023-01-07 PROCEDURE — 36415 COLL VENOUS BLD VENIPUNCTURE: CPT

## 2023-01-09 ENCOUNTER — TELEPHONE (OUTPATIENT)
Dept: OBGYN CLINIC | Age: 23
End: 2023-01-09

## 2023-01-09 RX ORDER — ASCORBIC ACID, CHOLECALCIFEROL, .ALPHA.-TOCOPHEROL ACETATE, DL-, PYRIDOXINE, FOLIC ACID, CYANOCOBALAMIN, CALCIUM, FERROUS FUMARATE, MAGNESIUM, DOCONEXENT 85; 200; 10; 25; 1; 12; 140; 27; 45; 300 [IU]/1; [IU]/1; [IU]/1; [IU]/1; MG/1; UG/1; MG/1; MG/1; MG/1; MG/1
1 CAPSULE, GELATIN COATED ORAL DAILY
Qty: 30 CAPSULE | Refills: 11 | Status: SHIPPED | OUTPATIENT
Start: 2023-01-09

## 2023-01-09 NOTE — TELEPHONE ENCOUNTER
[address] called and stated that the medication Prenatal vit-fe Fumarate-FA is not available and asking for another Prenatal medication for the patient please.  Thank you

## 2023-01-12 ENCOUNTER — TELEPHONE (OUTPATIENT)
Dept: OBGYN CLINIC | Age: 23
End: 2023-01-12

## 2023-01-12 RX ORDER — PROMETHAZINE HYDROCHLORIDE 25 MG/1
25 TABLET ORAL EVERY 8 HOURS PRN
Qty: 30 TABLET | Refills: 2 | Status: SHIPPED | OUTPATIENT
Start: 2023-01-12

## 2023-01-19 ENCOUNTER — TELEPHONE (OUTPATIENT)
Dept: OBGYN CLINIC | Age: 23
End: 2023-01-19

## 2023-01-19 ENCOUNTER — HOSPITAL ENCOUNTER (EMERGENCY)
Age: 23
Discharge: HOME | End: 2023-01-19
Attending: EMERGENCY MEDICINE
Payer: COMMERCIAL

## 2023-01-19 VITALS
SYSTOLIC BLOOD PRESSURE: 115 MMHG | DIASTOLIC BLOOD PRESSURE: 72 MMHG | RESPIRATION RATE: 16 BRPM | OXYGEN SATURATION: 98 % | HEART RATE: 79 BPM | TEMPERATURE: 97.2 F

## 2023-01-19 DIAGNOSIS — O23.40 URINARY TRACT INFECTION AFFECTING PREGNANCY: ICD-10-CM

## 2023-01-19 DIAGNOSIS — R11.0 NAUSEA: Primary | ICD-10-CM

## 2023-01-19 LAB
BACTERIA: ABNORMAL
BILIRUBIN URINE: NEGATIVE
CASTS UA: ABNORMAL /LPF (ref 0–8)
COLOR: YELLOW
EPITHELIAL CELLS UA: ABNORMAL /HPF (ref 0–5)
GLUCOSE URINE: NEGATIVE
HCG QUANTITATIVE: ABNORMAL MIU/ML
KETONES, URINE: ABNORMAL
LEUKOCYTE ESTERASE, URINE: ABNORMAL
NITRITE, URINE: NEGATIVE
PH UA: 6.5 (ref 5–8)
PROTEIN UA: ABNORMAL
RBC UA: ABNORMAL /HPF (ref 0–4)
SPECIFIC GRAVITY UA: 1.02 (ref 1–1.03)
TSH SERPL DL<=0.05 MIU/L-ACNC: 0.4 UIU/ML (ref 0.3–5)
TURBIDITY: CLEAR
URINE HGB: NEGATIVE
UROBILINOGEN, URINE: NORMAL
WBC UA: ABNORMAL /HPF (ref 0–5)

## 2023-01-19 PROCEDURE — 81001 URINALYSIS AUTO W/SCOPE: CPT

## 2023-01-19 PROCEDURE — 2580000003 HC RX 258: Performed by: STUDENT IN AN ORGANIZED HEALTH CARE EDUCATION/TRAINING PROGRAM

## 2023-01-19 PROCEDURE — 96361 HYDRATE IV INFUSION ADD-ON: CPT

## 2023-01-19 PROCEDURE — 84702 CHORIONIC GONADOTROPIN TEST: CPT

## 2023-01-19 PROCEDURE — 99284 EMERGENCY DEPT VISIT MOD MDM: CPT

## 2023-01-19 PROCEDURE — 87086 URINE CULTURE/COLONY COUNT: CPT

## 2023-01-19 PROCEDURE — 96374 THER/PROPH/DIAG INJ IV PUSH: CPT

## 2023-01-19 PROCEDURE — 84443 ASSAY THYROID STIM HORMONE: CPT

## 2023-01-19 PROCEDURE — 6370000000 HC RX 637 (ALT 250 FOR IP): Performed by: STUDENT IN AN ORGANIZED HEALTH CARE EDUCATION/TRAINING PROGRAM

## 2023-01-19 PROCEDURE — 6360000002 HC RX W HCPCS: Performed by: STUDENT IN AN ORGANIZED HEALTH CARE EDUCATION/TRAINING PROGRAM

## 2023-01-19 RX ORDER — SODIUM CHLORIDE, SODIUM LACTATE, POTASSIUM CHLORIDE, AND CALCIUM CHLORIDE .6; .31; .03; .02 G/100ML; G/100ML; G/100ML; G/100ML
1000 INJECTION, SOLUTION INTRAVENOUS ONCE
Status: COMPLETED | OUTPATIENT
Start: 2023-01-19 | End: 2023-01-19

## 2023-01-19 RX ORDER — CEPHALEXIN 250 MG/1
250 CAPSULE ORAL 4 TIMES DAILY
Qty: 20 CAPSULE | Refills: 0 | Status: SHIPPED | OUTPATIENT
Start: 2023-01-19 | End: 2023-01-24

## 2023-01-19 RX ORDER — CEPHALEXIN 250 MG/1
250 CAPSULE ORAL ONCE
Status: COMPLETED | OUTPATIENT
Start: 2023-01-19 | End: 2023-01-19

## 2023-01-19 RX ORDER — METOCLOPRAMIDE HYDROCHLORIDE 5 MG/ML
10 INJECTION INTRAMUSCULAR; INTRAVENOUS ONCE
Status: COMPLETED | OUTPATIENT
Start: 2023-01-19 | End: 2023-01-19

## 2023-01-19 RX ADMIN — METOCLOPRAMIDE 10 MG: 5 INJECTION, SOLUTION INTRAMUSCULAR; INTRAVENOUS at 19:22

## 2023-01-19 RX ADMIN — SODIUM CHLORIDE, POTASSIUM CHLORIDE, SODIUM LACTATE AND CALCIUM CHLORIDE 1000 ML: 600; 310; 30; 20 INJECTION, SOLUTION INTRAVENOUS at 19:20

## 2023-01-19 RX ADMIN — CEPHALEXIN 250 MG: 250 CAPSULE ORAL at 22:58

## 2023-01-19 ASSESSMENT — ENCOUNTER SYMPTOMS
SHORTNESS OF BREATH: 0
BACK PAIN: 0
NAUSEA: 1
VOMITING: 1
FACIAL SWELLING: 0
ABDOMINAL PAIN: 0

## 2023-01-19 NOTE — ED NOTES
The following labs were labeled with appropriate pt sticker and tubed to lab:     [x] Blue     [x] Lavender   [] on ice  [x] Green/yellow  [] Green/black [] on ice  [] Ashley Jean  [] on ice  [x] Yellow  [] Red  [] Type/ Screen  [] ABG  [] VBG    [] COVID-19 swab    [] Rapid  [] PCR  [] Flu swab  [] Peds Viral Panel     [] Urine Sample  [] Fecal Sample  [] Pelvic Cultures  [] Blood Cultures  [] X 2  [] STREP Cultures         Joanna Chester RN  01/19/23 4929

## 2023-01-19 NOTE — ED NOTES
Pt arrive to ED with report of nausea/emesis. Pt reports 7 weeks pregnant and reports prescribed regland but states medication has stopped working. Pt reports PMH of Nausea r/t PCOS and states ineffective treatment with zofran. Pt denies any abd pain, denies fever, chills, congestion.  Pt A&Ox4, RR even/unlabored      Glory Ruelas, RN  01/19/23 9607

## 2023-01-19 NOTE — TELEPHONE ENCOUNTER
Patient is having extreme vomiting. She can't keep any food or liquids down at all. She is having non stop vomiting. She can't keep any medications down either. Patient has a confirmed pregnancy. Lmp 11/24/22. Patient was advised to go to [facility] ED. Patient voiced understanding.

## 2023-01-20 LAB
CULTURE: NORMAL
SPECIMEN DESCRIPTION: NORMAL

## 2023-01-20 NOTE — ED PROVIDER NOTES
[facility]  ED  Emergency Department Encounter  Emergency Medicine Resident     Pt Jazzy Turcios  MRN: [id number]  [place] 2000  Date of evaluation: 23  PCP:  Lamberto Manzano  Note Started: 10:01 PM EST      CHIEF COMPLAINT       Chief Complaint   Patient presents with    Nausea & Vomiting     7 weeks preg        HISTORY OF PRESENT ILLNESS  (Location/Symptom, Timing/Onset, Context/Setting, Quality, Duration, Modifying Factors, Severity.)      Dereje Milian is a 25 y.o. female who presents with nausea and vomiting that has been ongoing for over the past week. Patient reports that she is 7 weeks pregnant, this was confirmed with blood work done at her OB/GYN's office. Patient was seen in another hospital for vomiting and given Reglan, which did improve her vomiting somewhat. However she has started having vomiting with very little preceding nausea. Patient reports she last took the Reglan this morning. Symptoms are progressively worsening. Denies any pain at this time. She does have a history of PCOS and Hashimoto's. She reports her OB/GYN was upset when her PCP took her off of her thyroid medications even though her thyroid levels were normal.    PAST MEDICAL / SURGICAL / SOCIAL / FAMILY HISTORY      has a past medical history of H/O bronchitis, Hypothyroid, PCOS (polycystic ovarian syndrome), Pre-diabetes, and Psoriasis. has a past surgical history that includes Tonsillectomy and adenoidectomy (2016); Upper gastrointestinal endoscopy (N/A, 2020); and Upper gastrointestinal endoscopy (N/A, 10/14/2022).     Social History     Socioeconomic History    Marital status: Single     Spouse name: Not on file    Number of children: Not on file    Years of education: Not on file    Highest education level: Not on file   Occupational History    Not on file   Tobacco Use    Smoking status: Former     Types: Cigarettes     Quit date: 2020     Years since quittin.9 Smokeless tobacco: Never   Vaping Use    Vaping Use: Every day    Last attempt to quit: 8/20/2020    Substances: Nicotine    Devices: Disposable   Substance and Sexual Activity    Alcohol use: Yes     Comment: socially    Drug use: Not Currently     Types: Marijuana Rico Marroquin)     Comment: uses marijuana every day- last use 10/14/22    Sexual activity: Yes     Partners: Male   Other Topics Concern    Not on file   Social History Narrative    Not on file     Social Determinants of Health     Financial Resource Strain: Low Risk     Difficulty of Paying Living Expenses: Not very hard   Food Insecurity: No Food Insecurity    Worried About Running Out of Food in the Last Year: Never true    Ran Out of Food in the Last Year: Never true   Transportation Needs: Not on file   Physical Activity: Not on file   Stress: Not on file   Social Connections: Not on file   Intimate Partner Violence: Not on file   Housing Stability: Not on file       Family History   Problem Relation Age of Onset    Diabetes Father     Sleep Apnea Father     Hypertension Father     No Known Problems Mother     Breast Cancer Paternal Grandmother        Allergies:  Zofran [ondansetron]    Home Medications:  Prior to Admission medications    Medication Sig Start Date End Date Taking?  Authorizing Provider   promethazine (PHENERGAN) 25 MG tablet Take 1 tablet by mouth every 8 hours as needed for Nausea 1/12/23   HILDA Loera CNP   Prenat w/o J-HK-Ldnikpw-FA-DHA (PNV-DHA) 27-0.6-0.4-300 MG CAPS Take 1 tablet by mouth daily 1/9/23   HILDA Loera CNP   Prenatal Vit-Fe Fumarate-FA (PNV PRENATAL PLUS MULTIVITAMIN) 27-1 MG TABS Take 1 tablet by mouth daily 1/6/23 1/1/24  HILDA Loera CNP   buPROPion [facility] SR) 150 MG extended release tablet Take 150 mg by mouth 2 times daily 7/5/22   Historical Provider, MD   ergocalciferol (ERGOCALCIFEROL) 1.25 MG (56663 UT) capsule Take 50,000 Units by mouth once a week 8/8/22   Historical Provider, MD famotidine (PEPCID) 20 MG tablet Take 20 mg by mouth 2 times daily    Historical Provider, MD   albuterol sulfate HFA (PROAIR HFA) 108 (90 Base) MCG/ACT inhaler Inhale 2 puffs into the lungs every 4 hours as needed for Wheezing 10/27/21   Joe Lara APRN - CNP   Cetirizine HCl (ZYRTEC PO) Take 1 tablet by mouth daily as needed    Historical Provider, MD       REVIEW OF SYSTEMS       Review of Systems   Constitutional:  Negative for appetite change. HENT:  Negative for facial swelling. Respiratory:  Negative for shortness of breath. Cardiovascular:  Negative for chest pain. Gastrointestinal:  Positive for nausea and vomiting. Negative for abdominal pain. Genitourinary:  Negative for vaginal bleeding. Musculoskeletal:  Negative for back pain. Skin:  Negative for wound. Allergic/Immunologic:        Zofran allergy   Neurological:  Negative for headaches. Hematological:         - AC   Psychiatric/Behavioral:  Negative for confusion. PHYSICAL EXAM      INITIAL VITALS:   /74   Pulse 71   Temp 99 °F (37.2 °C) (Oral)   Resp 18   SpO2 95%     Physical Exam  Constitutional:       General: She is not in acute distress. HENT:      Head: Normocephalic and atraumatic. Right Ear: External ear normal.      Left Ear: External ear normal.      Nose: Nose normal.   Eyes:      Conjunctiva/sclera: Conjunctivae normal.   Cardiovascular:      Rate and Rhythm: Normal rate. Pulses: Normal pulses. Heart sounds: Normal heart sounds. Pulmonary:      Effort: Pulmonary effort is normal. No respiratory distress. Breath sounds: Normal breath sounds. Abdominal:      General: Abdomen is flat. There is no distension. Palpations: Abdomen is soft. Tenderness: There is no abdominal tenderness. There is no guarding or rebound. Musculoskeletal:         General: No swelling. Cervical back: No tenderness. Skin:     General: Skin is warm.       Capillary Refill: Capillary refill takes 2 to 3 seconds. Neurological:      Mental Status: She is alert and oriented to person, place, and time. Psychiatric:         Mood and Affect: Mood normal.         DDX/DIAGNOSTIC RESULTS / EMERGENCY DEPARTMENT COURSE / MDM     Medical Decision Making  Patient has been unable to tolerate p.o. differential including gastritis, hyperemesis gravidarum, molar pregnancy, PCOS related, COVID, flu, UTI. Reports no sick contacts will defer COVID and flu testing at this time. Will obtain thyroid testing and urine. Quantitative hCG. Amount and/or Complexity of Data Reviewed  Labs: ordered. Decision-making details documented in ED Course. Risk  Prescription drug management. EMERGENCY DEPARTMENT COURSE:    ED Course as of 01/19/23 2205   Thu Jan 19, 2023 2023 hCG Quant(!): 63,921 [ML]   2200 TSH: 0.40 [ML]      ED Course User Index  [ML] Bill Fonseca DO     P.o. challenge initiated. Quantitative hCG appropriate for level of pregnancy, minimal concern for molar pregnancy or gestational trophoblastic disease. TSH also within normal limits. Pending urine studies. FINAL IMPRESSION      1.  Nausea          DISPOSITION / PLAN     DISPOSITION        PATIENT REFERRED TO:  Delbert Bauman  [address] [place], [place] [id number] [phone]          DISCHARGE MEDICATIONS:  New Prescriptions    No medications on file       Glen Lr DO  Emergency Medicine Resident    (Please note that portions of thisnote were completed with a voice recognition program.  Efforts were made to edit the dictations but occasionally words are mis-transcribed.)       Bill Fonseca DO  Resident  01/19/23 8043

## 2023-01-20 NOTE — DISCHARGE INSTRUCTIONS
You were seen in the ER due to nausea and vomiting while pregnant. While you are here we did an in-depth work-up including some blood work and a urine test.  You are found to have a urinary tract infection. A prescription for Keflex was sent electronically to your pharmacy and you were given a dose while you are here. For the nausea and vomiting, you should take the medications that were prescribed to you. If you start to have nausea despite taking these medications, then you can start taking Benadryl every 6 hours as needed. If this does not work, then you should come back to the emergency department. Majo Hanson!!!    From [facility] Emergency Department    On behalf of the Emergency Department staff at [facility]. [facility] Emergency Department, I would like to thank you for giving [facility] the opportunity to address your health care needs and concerns. We hope that during your visit, our service was delivered in a professional and caring manner. Please keep [facility] in mind as we walk with you down the path to your own personal wellness. Please expect an automated phone call from [phone] so we can ask a few questions about your health and progress. Based on your answers, a clinician may call you back to offer help and instructions. If you notice any concerning symptoms please return to the ER immediately. These can include but are not limited to: fevers, chills, shortness of breath, vomiting, weakness of the extremities, changes in your mental status, numbness, pale extremities, or chest pain.

## 2023-01-20 NOTE — ED PROVIDER NOTES
[facility] ED  Emergency Department  Emergency Medicine Resident Sign-out     Care of Sual Hale was assumed from Dr. Christianne Fried and is being seen for Nausea & Vomiting (7 weeks preg )  . The patient's initial evaluation and plan have been discussed with the prior provider who initially evaluated the patient. EMERGENCY DEPARTMENT COURSE / MEDICAL DECISION MAKING:       MEDICATIONS GIVEN:  Orders Placed This Encounter   Medications    metoclopramide (REGLAN) injection 10 mg    lactated ringers bolus    cephALEXin (KEFLEX) capsule 250 mg     Order Specific Question:   Antimicrobial Indications     Answer:   Urinary Tract Infection    cephALEXin (KEFLEX) 250 MG capsule     Sig: Take 1 capsule by mouth 4 times daily for 5 days     Dispense:  20 capsule     Refill:  0       LABS / RADIOLOGY:     Labs Reviewed   HCG, QUANTITATIVE, PREGNANCY - Abnormal; Notable for the following components:       Result Value    hCG FZYJF 54,671 (*)     All other components within normal limits   URINALYSIS WITH REFLEX TO CULTURE - Abnormal; Notable for the following components:    Ketones, Urine LARGE (*)     Protein, UA TRACE (*)     Leukocyte Esterase, Urine TRACE (*)     All other components within normal limits   MICROSCOPIC URINALYSIS - Abnormal; Notable for the following components:    Bacteria, UA FEW (*)     All other components within normal limits   CULTURE, URINE   TSH WITH REFLEX       No results found. RECENT VITALS:     Temp: 97.2 °F (36.2 °C),  Heart Rate: 79, Resp: 16, BP: 115/72, SpO2: 98 %      This patient is a 25 y.o. Female with 1 week of nausea and vomiting in the context of 7 weeks gestation. No abdominal pain, vaginal bleeding. She is not yet had a confirmed IUP due to early pregnancy. Patient has an allergy to Zofran, was prescribed Reglan at outlying facility and has not been taking it frequently.   Last time she took it today was 9:30 AM.  On arrival here, she was given Reglan and has been tolerating p.o. well. Has already been prescribed diplegia's, however has not been able to pick it up yet. Awaiting urinalysis to rule out urinary tract infection. ED Course as of 01/20/23 0733   Thu Jan 19, 2023 2023 hCG Quant(!): 96,224 [ML]   2200 TSH: 0.40 [ML]   2237 Bacteria, UA(!): FEW  Urinalysis showing few bacteria, positive leukocyte Estrace but negative nitrites. 5-10 epithelial cells. Since she is pregnant, plan to treat with Keflex and plan for discharge. [JT]      ED Course User Index  [JT] Jignesh Diamond MD  [ML] Xavier Hernandez,        OUTSTANDING TASKS / RECOMMENDATIONS:    F/u UA     FINAL IMPRESSION:     1. Nausea    2.  Urinary tract infection affecting pregnancy        DISPOSITION:         DISPOSITION:  [x]  Discharge   []  Transfer -    []  Admission -     []  Against Medical Advice   []  Eloped   FOLLOW-UP: Praful Cho  [address]  [address] [facility] [address]  [address] [address]  [phone]        [facility]  [facility]  [phone]  Go to   If symptoms worsen     DISCHARGE MEDICATIONS: Discharge Medication List as of 1/19/2023 10:44 PM        START taking these medications    Details   cephALEXin (KEFLEX) 250 MG capsule Take 1 capsule by mouth 4 times daily for 5 days, Disp-20 capsule, R-0Normal                Jignesh Diamond MD  Emergency Medicine Resident  [address] [facility]      Jignesh Diamond MD  Resident  01/20/23 8049

## 2023-01-20 NOTE — ED NOTES
Pt is awake resting in stretcher with c/o nausea. RR even and unlabored, alert and oriented. Pt states she is 6-7 weeks pregnant. Pt provided with warm blanket, ordered medication given.         Jose Valdez RN  01/19/23 1951

## 2023-01-20 NOTE — ED NOTES
Dr. Brigitte Henley at bedside discussing plan of care with patient. Patient again encouraged to provided urine sample. Patient states she does not feel the urge to urinate at this time. Dr. Brigitte Henley provided patient with lunch box for PO challenge.        Adriana Pennington RN  01/19/23 2100

## 2023-01-20 NOTE — ED NOTES
Pt was able to tolerate food. Pt states she doesn't feel nauseous.      Shanthi Nunez RN  01/19/23 9790

## 2023-01-20 NOTE — ED PROVIDER NOTES
I performed a history and physical examination of the patient and discussed management with the resident. I reviewed the residents note and agree with the documented findings and plan of care. Any areas of disagreement are noted on the chart. I was personally present for the key portions of any procedures. I have documented in the chart those procedures where I was not present during the key portions. I have reviewed the emergency nurses triage note. I agree with the chief complaint, past medical history, past surgical history, allergies, medications, social and family history as documented unless otherwise noted below. Documentation of the HPI, Physical Exam and Medical Decision Making performed by medical students or scribes is based on my personal performance of the HPI, PE and MDM. For Phys Assistant/ Nurse Practitioner cases/documentation I have personally evaluated this patient and have completed at least one if not all key elements of the E/M (history, physical exam, and MDM). I find the patient's history and physical exam are consistent with the NP/PA documentation. I agree with the care provided, treatment rendered, disposition and followup plan. Additional findings are as noted. Fred Gordon. Jacquie Lockett MD  Attending Emergency  Physician    This patient presented to the emerged part with complaints of persistent nausea and vomiting. Patient is primigravida who is approximately 7 weeks gestation and has been having nausea and vomiting for most of the pregnancy. She is currently taking metoclopramide with some relief however her last dose was early this morning. She denies any fevers or chills. No hematemesis. No diarrhea. She does report some modest vaginal discharge but no vaginal bleeding. No abdominal pain. No dysuria, hematuria, urgency. Awake, alert, cooperative, responsive. Speech fluent, normal comprehension. Lungs clear bilaterally. No rales, rhonchi, wheezes, stridor, retractions. Cardiac-S1S2, RRR, no murmur, rub, or gallop. Abdomen soft, nondistended, nontender. No organomegaly, mass, bruit. Normal bowel sounds. Impression: Pregnancy with hyperemesis. Plan: We will obtain routine blood work and establish IV access and administer an IV fluid bolus as well as antiemetics. Will reassess the patient.           Allen Amezcua MD  01/19/23 5759

## 2023-01-25 DIAGNOSIS — R11.0 NAUSEA: Primary | ICD-10-CM

## 2023-01-25 DIAGNOSIS — N92.6 MISSED MENSES: ICD-10-CM

## 2023-01-25 DIAGNOSIS — Z32.01 POSITIVE PREGNANCY TEST: Primary | ICD-10-CM

## 2023-01-25 RX ORDER — PYRIDOXINE HCL (VITAMIN B6) 50 MG
TABLET ORAL
Qty: 30 TABLET | Refills: 2 | Status: SHIPPED | OUTPATIENT
Start: 2023-01-25

## 2023-01-26 ENCOUNTER — TELEPHONE (OUTPATIENT)
Dept: OBGYN CLINIC | Age: 23
End: 2023-01-26

## 2023-01-26 NOTE — TELEPHONE ENCOUNTER
Patient reports the unisom and vitamin B6 is not working through the day. Patient offered Bonjesta samples per Omayra STEVENS verbal orders and patient accepted. Patient given verbal instructions on taking the medication. Samples of Bonjesta were given to the patient, quantity 2 bottles, Lot Number -[id number], NDC [id number], exp 02/28/2023.

## 2023-01-26 NOTE — TELEPHONE ENCOUNTER
Patient is still having a lot of vomiting and is not sure why she can't take the [address]. She is back to vomiting again today. The promethazine isn't working. Please advise.

## 2023-02-15 ENCOUNTER — HOSPITAL ENCOUNTER (OUTPATIENT)
Age: 23
Setting detail: SPECIMEN
Discharge: HOME | End: 2023-02-15

## 2023-02-15 ENCOUNTER — INITIAL PRENATAL (OUTPATIENT)
Dept: OBGYN CLINIC | Age: 23
End: 2023-02-15

## 2023-02-15 VITALS
HEART RATE: 93 BPM | WEIGHT: 229.2 LBS | SYSTOLIC BLOOD PRESSURE: 110 MMHG | HEIGHT: 68 IN | BODY MASS INDEX: 34.74 KG/M2 | DIASTOLIC BLOOD PRESSURE: 72 MMHG

## 2023-02-15 DIAGNOSIS — E03.9 HYPOTHYROIDISM AFFECTING PREGNANCY, ANTEPARTUM: ICD-10-CM

## 2023-02-15 DIAGNOSIS — Z3A.10 10 WEEKS GESTATION OF PREGNANCY: ICD-10-CM

## 2023-02-15 DIAGNOSIS — Z36.89 ENCOUNTER FOR FETAL ANATOMIC SURVEY: ICD-10-CM

## 2023-02-15 DIAGNOSIS — O09.91 ENCOUNTER FOR SUPERVISION OF HIGH RISK PREGNANCY IN FIRST TRIMESTER, ANTEPARTUM: ICD-10-CM

## 2023-02-15 DIAGNOSIS — O99.280 HYPOTHYROIDISM AFFECTING PREGNANCY, ANTEPARTUM: ICD-10-CM

## 2023-02-15 DIAGNOSIS — O09.91 ENCOUNTER FOR SUPERVISION OF HIGH RISK PREGNANCY IN FIRST TRIMESTER, ANTEPARTUM: Primary | ICD-10-CM

## 2023-02-15 LAB
BILIRUBIN URINE: NEGATIVE
CASTS UA: NORMAL /LPF (ref 0–8)
COLOR: YELLOW
EPITHELIAL CELLS UA: NORMAL /HPF (ref 0–5)
GLUCOSE UR STRIP.AUTO-MCNC: NEGATIVE MG/DL
KETONES UR STRIP.AUTO-MCNC: NEGATIVE MG/DL
LEUKOCYTE ESTERASE UR QL STRIP.AUTO: NEGATIVE
NITRITE UR QL STRIP.AUTO: NEGATIVE
PROT UR STRIP.AUTO-MCNC: 7 MG/DL (ref 5–8)
PROT UR STRIP.AUTO-MCNC: NEGATIVE MG/DL
RBC CLUMPS #/AREA URNS AUTO: NORMAL /HPF (ref 0–4)
SPECIFIC GRAVITY UA: 1.02 (ref 1–1.03)
TURBIDITY: ABNORMAL
URINE HGB: NEGATIVE
UROBILINOGEN, URINE: NORMAL
WBC UA: NORMAL /HPF (ref 0–5)

## 2023-02-15 SDOH — ECONOMIC STABILITY: FOOD INSECURITY: WITHIN THE PAST 12 MONTHS, THE FOOD YOU BOUGHT JUST DIDN'T LAST AND YOU DIDN'T HAVE MONEY TO GET MORE.: SOMETIMES TRUE

## 2023-02-15 SDOH — ECONOMIC STABILITY: FOOD INSECURITY: WITHIN THE PAST 12 MONTHS, YOU WORRIED THAT YOUR FOOD WOULD RUN OUT BEFORE YOU GOT MONEY TO BUY MORE.: SOMETIMES TRUE

## 2023-02-15 SDOH — ECONOMIC STABILITY: HOUSING INSECURITY
IN THE LAST 12 MONTHS, WAS THERE A TIME WHEN YOU DID NOT HAVE A STEADY PLACE TO SLEEP OR SLEPT IN A SHELTER (INCLUDING NOW)?: NO

## 2023-02-15 SDOH — ECONOMIC STABILITY: INCOME INSECURITY: HOW HARD IS IT FOR YOU TO PAY FOR THE VERY BASICS LIKE FOOD, HOUSING, MEDICAL CARE, AND HEATING?: HARD

## 2023-02-15 NOTE — PROGRESS NOTES
Jo-Ann Padilla is a 25 y.o. female 10w4d        OB History    Para Term  AB Living   1 0 0 0 0 0   SAB IAB Ectopic Molar Multiple Live Births   0 0 0 0 0 0      # Outcome Date GA Lbr Zen/2nd Weight Sex Delivery Anes PTL Lv   1 Current              Blood pressure 110/72, pulse 93, height 5' 8\" (1.727 m), weight 229 lb 3.2 oz (104 kg), last menstrual period 2022, not currently breastfeeding. The patient was seen and evaluated. There was Positive fetal movements. No contractions or leakage of fluid. Signs and symptoms of  labor as well as labor were reviewed. A Quad Screen was ordered for a 15-20 week gestational age window. Dates were reviewed with the patient. An 18-22 week anatomy ultrasound has been ordered. The patient will return to the office for her next visit in 4 weeks. See antepartum flow sheet. Patient Active Problem List    Diagnosis Date Noted    Encounter for supervision of high risk pregnancy in first trimester, antepartum 02/15/2023     Priority: Medium    Hypothyroidism affecting pregnancy, antepartum 02/15/2023     Priority: Medium    Irritable bowel syndrome with both constipation and diarrhea 2020    PCOS (polycystic ovarian syndrome) 2020    Amenorrhea 2020    Pelvic pain in female 2020        Diagnosis Orders   1.  Encounter for supervision of high risk pregnancy in first trimester, antepartum  Cystic fibrosis gene test    HIV Screen    Prenatal Profile I    Sickle Cell Screen    TSH    Urinalysis with Reflex to Culture    Vaginitis DNA Probe    C.trachomatis N.gonorrhoeae DNA    Varsha Bellamy MD, Maternal Fetal Medicine, [place]    Glucose tolerance, 1 hour    25443 - Venipuncture      2. 10 weeks gestation of pregnancy  Cystic fibrosis gene test    HIV Screen    Prenatal Profile I    Sickle Cell Screen    TSH    Urinalysis with Reflex to Culture    Vaginitis DNA Probe    C.trachomatis N.gonorrhoeae DNA    [facility] Yamil Deluna MD, Maternal Fetal Medicine, [place]    Glucose tolerance, 1 hour    43842 - Venipuncture      3. Hypothyroidism affecting pregnancy, antepartum        4. Encounter for fetal anatomic survey  Adriana Espinoza MD, Maternal Fetal Medicine, [place]      Continue prenatal vitamins, increase water intake and frequent rest periods as needed. Patient declined 1st trimester screening     10w4d Prenatal history and OB education, including milestones throughout the pregnancy, vaccinations recommended while pregnant, any risk factors that may cause the patient to become high risk requiring  testing, and any viruses that may cause complications or fetal anomalies was completed. Total time spent with patient was 40 minutes face-to-face.     Electronically signed by: Vazquez Pagan CNP

## 2023-02-15 NOTE — PROGRESS NOTES
Patient presents to office for OB intake, nurse visit only. Intake completed following ultrasound in office to confirm pregnancy dating/viability. Based on ultrasound, patient is currently 10w4d  gestation, Estimated Date of Delivery: 9/9/23. Patient presents to intake Alone. This was an unplanned pregnancy. Patient currently taking has a current medication list which includes the following prescription(s): metoclopramide, doxylamine succinate, pyridoxine, pnv-dha, ergocalciferol, albuterol sulfate hfa, cetirizine hcl, pnv prenatal plus multivitamin, bupropion, and famotidine. . Patient's medical, surgical, obstetrical and family history reviewed. See HER for details. Patient prenatal lab work given to patient at this visit as well as OB education material. New OB consent forms signed. Patient declined first trimester screening. Cystic Fibrosis screening collected in office today . Referral placed to [facility] for second trimester ultrasound. Patient scheduled for follow up OB appointment with Sonu Randhawa MD. Prenatal labs collected in office today. Patient was in the office today for a Prenatal profile, TSH, HIV, SC, CF, GTT. Draw per physician order using sterile technique.   Drawn from the Right antecubital.

## 2023-02-16 LAB
ABO/RH: NORMAL
ABSOLUTE EOS #: 0.33 K/UL (ref 0–0.44)
ABSOLUTE IMMATURE GRANULOCYTE: 0.06 K/UL (ref 0–0.3)
ABSOLUTE LYMPH #: 2.56 K/UL (ref 1.1–3.7)
ABSOLUTE MONO #: 0.8 K/UL (ref 0.1–1.2)
ANTIBODY SCREEN: NEGATIVE
BASOPHILS # BLD: 1 % (ref 0–2)
BASOPHILS ABSOLUTE: 0.08 K/UL (ref 0–0.2)
C TRACH DNA SPEC QL PROBE+SIG AMP: NEGATIVE
CANDIDA SPECIES, DNA PROBE: NEGATIVE
EOSINOPHILS RELATIVE PERCENT: 2 % (ref 1–4)
GARDNERELLA VAGINALIS, DNA PROBE: NEGATIVE
GLUCOSE ADMINISTRATION: NORMAL
GLUCOSE TOLERANCE SCREEN 50G: 116 MG/DL (ref 70–135)
HBV SURFACE AG SER QL: NONREACTIVE
HCT VFR BLD AUTO: 40.4 % (ref 36.3–47.1)
HGB BLD-MCNC: 13.5 G/DL (ref 11.9–15.1)
HIV 1+2 AB+HIV1 P24 AG SERPL QL IA: NONREACTIVE
IMMATURE GRANULOCYTES: 0 %
LYMPHOCYTES # BLD: 18 % (ref 24–43)
MCH RBC QN AUTO: 31.4 PG (ref 25.2–33.5)
MCHC RBC AUTO-ENTMCNC: 33.4 G/DL (ref 28.4–34.8)
MCV RBC AUTO: 94 FL (ref 82.6–102.9)
MONOCYTES # BLD: 6 % (ref 3–12)
N GONORRHOEA DNA SPEC QL PROBE+SIG AMP: NEGATIVE
NRBC AUTOMATED: 0 PER 100 WBC
PDW BLD-RTO: 12.9 % (ref 11.8–14.4)
PLATELET # BLD AUTO: 299 K/UL (ref 138–453)
PMV BLD AUTO: 11.3 FL (ref 8.1–13.5)
RBC # BLD: 4.3 M/UL (ref 3.95–5.11)
RUBV IGG SER QL: 121.7 IU/ML
SEG NEUTROPHILS: 73 % (ref 36–65)
SEGMENTED NEUTROPHILS ABSOLUTE COUNT: 10.28 K/UL (ref 1.5–8.1)
SICKLE CELL SCREEN: NEGATIVE
SOURCE: NORMAL
SPECIMEN DESCRIPTION: NORMAL
T PALLIDUM AB SER QL IA: NONREACTIVE
TRICHOMONAS VAGINALIS DNA: NEGATIVE
TSH SERPL-ACNC: 2.87 UIU/ML (ref 0.3–5)
WBC # BLD AUTO: 14.1 K/UL (ref 3.5–11.3)

## 2023-03-16 ENCOUNTER — ROUTINE PRENATAL (OUTPATIENT)
Dept: OBGYN CLINIC | Age: 23
End: 2023-03-16

## 2023-03-16 VITALS
HEART RATE: 103 BPM | WEIGHT: 234 LBS | DIASTOLIC BLOOD PRESSURE: 76 MMHG | SYSTOLIC BLOOD PRESSURE: 118 MMHG | BODY MASS INDEX: 35.58 KG/M2

## 2023-03-16 DIAGNOSIS — O09.92 HRP (HIGH RISK PREGNANCY), SECOND TRIMESTER: Primary | ICD-10-CM

## 2023-03-16 DIAGNOSIS — Z3A.14 14 WEEKS GESTATION OF PREGNANCY: ICD-10-CM

## 2023-03-16 DIAGNOSIS — O99.280 HYPOTHYROIDISM AFFECTING PREGNANCY, ANTEPARTUM: ICD-10-CM

## 2023-03-16 DIAGNOSIS — E03.9 HYPOTHYROIDISM AFFECTING PREGNANCY, ANTEPARTUM: ICD-10-CM

## 2023-03-16 NOTE — PROGRESS NOTES
Caitie Reyes is a 25 y.o. female 14w5d        OB History    Para Term  AB Living   1 0 0 0 0 0   SAB IAB Ectopic Molar Multiple Live Births   0 0 0 0 0 0      # Outcome Date GA Lbr Zen/2nd Weight Sex Delivery Anes PTL Lv   1 Current                Chief Complaint   Patient presents with    High Risk Pregnancy            Blood pressure 118/76, pulse (!) 103, weight 234 lb (106.1 kg), last menstrual period 2022, not currently breastfeeding. The patient was seen and evaluated. There was N/A fetal movements. No contractions or leakage of fluid. Signs and symptoms of  labor as well as labor were reviewed. Dates were reviewed with the patient. The patient will return to the office for her next visit in 4 weeks. See antepartum flow sheet. Patient Active Problem List    Diagnosis Date Noted    Encounter for supervision of high risk pregnancy in first trimester, antepartum 02/15/2023     Priority: Medium    Hypothyroidism affecting pregnancy, antepartum 02/15/2023     Priority: Medium    Irritable bowel syndrome with both constipation and diarrhea 2020    PCOS (polycystic ovarian syndrome) 2020    Amenorrhea 2020    Pelvic pain in female 2020        Diagnosis Orders   1. HRP (high risk pregnancy), second trimester        2. 14 weeks gestation of pregnancy        3. Hypothyroidism affecting pregnancy, antepartum            Patient doing well. Fetal heart rate auscultated at 140 bpm and fundal height was not measured today due to early gestational age. Patient advised to continue taking prenatal vitamins and to rest as necessary. Ming Shah am scribing for, and in the presence of Dr. Ross Gary. Electronically signed by: Billy Matson 3/16/23 3:19 PM   I agree to the above documentation placed by my scribe Billy Matson. I reviewed the scribe's note and agree with the documented findings and plan of care.  Any areas of disagreement are noted on the chart.   I have personally evaluated this patient. Additional findings are as noted. I agree with the chief complaint, past medical history, past surgical history, allergies, medications, social and family history as documented unless otherwise noted below.     Electronically signed by Clemente Wright MD on 3/16/2023 at 5:00 PM

## 2023-03-22 DIAGNOSIS — R11.0 NAUSEA: ICD-10-CM

## 2023-04-25 ENCOUNTER — ROUTINE PRENATAL (OUTPATIENT)
Dept: PERINATAL CARE | Age: 23
End: 2023-04-25
Payer: COMMERCIAL

## 2023-04-25 ENCOUNTER — TELEPHONE (OUTPATIENT)
Dept: OBGYN CLINIC | Age: 23
End: 2023-04-25

## 2023-04-25 VITALS
HEART RATE: 80 BPM | WEIGHT: 242 LBS | DIASTOLIC BLOOD PRESSURE: 60 MMHG | TEMPERATURE: 99 F | SYSTOLIC BLOOD PRESSURE: 118 MMHG | RESPIRATION RATE: 16 BRPM | BODY MASS INDEX: 36.68 KG/M2 | HEIGHT: 68 IN

## 2023-04-25 DIAGNOSIS — O99.212 OBESITY AFFECTING PREGNANCY IN SECOND TRIMESTER: ICD-10-CM

## 2023-04-25 DIAGNOSIS — E03.9 HYPOTHYROIDISM AFFECTING PREGNANCY IN SECOND TRIMESTER: Primary | ICD-10-CM

## 2023-04-25 DIAGNOSIS — Z36.86 ENCOUNTER FOR SCREENING FOR RISK OF PRE-TERM LABOR: ICD-10-CM

## 2023-04-25 DIAGNOSIS — O99.280 HYPOTHYROIDISM AFFECTING PREGNANCY, ANTEPARTUM: ICD-10-CM

## 2023-04-25 DIAGNOSIS — Z83.3 FAMILY HISTORY OF DIABETES MELLITUS (DM): Primary | ICD-10-CM

## 2023-04-25 DIAGNOSIS — O99.891 CURRENT MATERNAL CONDITION AFFECTING PREGNANCY: ICD-10-CM

## 2023-04-25 DIAGNOSIS — O99.282 HYPOTHYROIDISM AFFECTING PREGNANCY IN SECOND TRIMESTER: Primary | ICD-10-CM

## 2023-04-25 DIAGNOSIS — Z3A.20 20 WEEKS GESTATION OF PREGNANCY: ICD-10-CM

## 2023-04-25 DIAGNOSIS — J45.909 ASTHMA AFFECTING PREGNANCY IN SECOND TRIMESTER: ICD-10-CM

## 2023-04-25 DIAGNOSIS — O99.512 ASTHMA AFFECTING PREGNANCY IN SECOND TRIMESTER: ICD-10-CM

## 2023-04-25 DIAGNOSIS — E03.9 HYPOTHYROIDISM AFFECTING PREGNANCY, ANTEPARTUM: ICD-10-CM

## 2023-04-25 LAB
ABDOMINAL CIRCUMFERENCE: NORMAL
ABDOMINAL CIRCUMFERENCE: NORMAL
BIPARIETAL DIAMETER: NORMAL
BIPARIETAL DIAMETER: NORMAL
ESTIMATED FETAL WEIGHT: NORMAL
ESTIMATED FETAL WEIGHT: NORMAL
FEMORAL DIAMETER: NORMAL
FEMORAL DIAMETER: NORMAL
HC/AC: NORMAL
HC/AC: NORMAL
HEAD CIRCUMFERENCE: NORMAL
HEAD CIRCUMFERENCE: NORMAL

## 2023-04-25 PROCEDURE — 76817 TRANSVAGINAL US OBSTETRIC: CPT | Performed by: OBSTETRICS & GYNECOLOGY

## 2023-04-25 PROCEDURE — 99999 PR OFFICE/OUTPT VISIT,PROCEDURE ONLY: CPT | Performed by: OBSTETRICS & GYNECOLOGY

## 2023-04-25 PROCEDURE — 76811 OB US DETAILED SNGL FETUS: CPT | Performed by: OBSTETRICS & GYNECOLOGY

## 2023-04-25 NOTE — TELEPHONE ENCOUNTER
Referral placed for family hx DM, maternal hypothyroidism & paternal autism per [facility] request.

## 2023-04-26 RX ORDER — ASPIRIN 81 MG/1
81 TABLET ORAL DAILY
Qty: 30 TABLET | Refills: 5 | Status: SHIPPED | OUTPATIENT
Start: 2023-04-26

## 2023-05-10 ENCOUNTER — ROUTINE PRENATAL (OUTPATIENT)
Dept: OBGYN CLINIC | Age: 23
End: 2023-05-10

## 2023-05-10 VITALS
SYSTOLIC BLOOD PRESSURE: 116 MMHG | HEART RATE: 76 BPM | BODY MASS INDEX: 36.95 KG/M2 | DIASTOLIC BLOOD PRESSURE: 68 MMHG | WEIGHT: 243 LBS

## 2023-05-10 DIAGNOSIS — O09.91 HRP (HIGH RISK PREGNANCY), FIRST TRIMESTER: Primary | ICD-10-CM

## 2023-05-10 DIAGNOSIS — M54.42 ACUTE LEFT-SIDED LOW BACK PAIN WITH LEFT-SIDED SCIATICA: ICD-10-CM

## 2023-05-10 DIAGNOSIS — J45.909 ASTHMA AFFECTING PREGNANCY IN SECOND TRIMESTER: ICD-10-CM

## 2023-05-10 DIAGNOSIS — E03.9 HYPOTHYROIDISM AFFECTING PREGNANCY, ANTEPARTUM: ICD-10-CM

## 2023-05-10 DIAGNOSIS — Z3A.22 22 WEEKS GESTATION OF PREGNANCY: ICD-10-CM

## 2023-05-10 DIAGNOSIS — M54.9 BACK PAIN AFFECTING PREGNANCY IN SECOND TRIMESTER: ICD-10-CM

## 2023-05-10 DIAGNOSIS — O99.512 ASTHMA AFFECTING PREGNANCY IN SECOND TRIMESTER: ICD-10-CM

## 2023-05-10 DIAGNOSIS — O99.891 BACK PAIN AFFECTING PREGNANCY IN SECOND TRIMESTER: ICD-10-CM

## 2023-05-10 DIAGNOSIS — E28.2 PCO (POLYCYSTIC OVARIES): ICD-10-CM

## 2023-05-10 DIAGNOSIS — O99.280 HYPOTHYROIDISM AFFECTING PREGNANCY, ANTEPARTUM: ICD-10-CM

## 2023-05-10 PROCEDURE — 0502F SUBSEQUENT PRENATAL CARE: CPT | Performed by: CLINICAL NURSE SPECIALIST

## 2023-05-10 NOTE — PROGRESS NOTES
Sajan [address] is a 25 y.o. female 22w4d, patient reports lower back pain that radiates down into the left leg, rates pain at 6/10       OB History    Para Term  AB Living   1 0 0 0 0 0   SAB IAB Ectopic Molar Multiple Live Births   0 0 0 0 0 0      # Outcome Date GA Lbr Zen/2nd Weight Sex Delivery Anes PTL Lv   1 Current                  Blood pressure 116/68, pulse 76, weight 243 lb (110.2 kg), last menstrual period 2022, not currently breastfeeding. The patient was seen and evaluated. There was positive fetal movements. No contractions or leakage of fluid. Signs and symptoms of  labor as well as labor were reviewed. The patients anatomy ultrasound has been completed and reviewed with patient. A 28 week lab panel was ordered. This includes a (CBC, 1 hr GTT). The patient is to complete this in the next two to four weeks. The S/S of Pre-Eclampsia were reviewed with the patient in detail. She is to report any of these if they occur. She currently denies any of these. The patient is RH positive Rhogam Ordered: Not due at this time. Patient Active Problem List    Diagnosis Date Noted    14 weeks gestation of pregnancy 2023     Priority: Medium    HRP (high risk pregnancy), second trimester 02/15/2023     Priority: Medium    Hypothyroidism affecting pregnancy, antepartum 02/15/2023     Priority: Medium    Irritable bowel syndrome with both constipation and diarrhea 2020    PCOS (polycystic ovarian syndrome) 2020    Amenorrhea 2020    Pelvic pain in female 2020        Diagnosis Orders   1. HRP (high risk pregnancy), first trimester        2. 22 weeks gestation of pregnancy        3. Hypothyroidism affecting pregnancy, antepartum        4. PCO (polycystic ovaries)        5. Asthma affecting pregnancy in second trimester        6.  Back pain affecting pregnancy in second trimester        Continue prenatal vitamins, increase water intake and

## 2023-06-06 ENCOUNTER — ROUTINE PRENATAL (OUTPATIENT)
Dept: PERINATAL CARE | Age: 23
End: 2023-06-06
Payer: COMMERCIAL

## 2023-06-06 VITALS
WEIGHT: 244 LBS | BODY MASS INDEX: 36.98 KG/M2 | DIASTOLIC BLOOD PRESSURE: 76 MMHG | HEIGHT: 68 IN | RESPIRATION RATE: 16 BRPM | TEMPERATURE: 97.3 F | SYSTOLIC BLOOD PRESSURE: 128 MMHG | HEART RATE: 97 BPM

## 2023-06-06 DIAGNOSIS — O35.2XX0 HEREDITARY FAMILIAL DISEASE AFFECTING MANAGEMENT OF MOTHER AND POSSIBLY AFFECTING FETUS, ANTEPARTUM, SINGLE OR UNSPECIFIED FETUS: ICD-10-CM

## 2023-06-06 DIAGNOSIS — E03.9 HYPOTHYROIDISM AFFECTING PREGNANCY IN SECOND TRIMESTER: ICD-10-CM

## 2023-06-06 DIAGNOSIS — O16.2 HYPERTENSION AFFECTING PREGNANCY IN SECOND TRIMESTER: Primary | ICD-10-CM

## 2023-06-06 DIAGNOSIS — Z3A.26 26 WEEKS GESTATION OF PREGNANCY: ICD-10-CM

## 2023-06-06 DIAGNOSIS — O99.282 HYPOTHYROIDISM AFFECTING PREGNANCY IN SECOND TRIMESTER: ICD-10-CM

## 2023-06-06 DIAGNOSIS — Z36.4 ULTRASOUND FOR ANTENATAL SCREENING FOR FETAL GROWTH RESTRICTION: ICD-10-CM

## 2023-06-06 DIAGNOSIS — O99.891 CURRENT MATERNAL CONDITION AFFECTING PREGNANCY: ICD-10-CM

## 2023-06-06 DIAGNOSIS — J45.909 ASTHMA AFFECTING PREGNANCY IN SECOND TRIMESTER: ICD-10-CM

## 2023-06-06 DIAGNOSIS — O99.212 OBESITY AFFECTING PREGNANCY IN SECOND TRIMESTER: ICD-10-CM

## 2023-06-06 DIAGNOSIS — O99.512 ASTHMA AFFECTING PREGNANCY IN SECOND TRIMESTER: ICD-10-CM

## 2023-06-06 LAB
ABDOMINAL CIRCUMFERENCE: NORMAL
BIPARIETAL DIAMETER: NORMAL
ESTIMATED FETAL WEIGHT: NORMAL
FEMORAL DIAMETER: NORMAL
HC/AC: NORMAL
HEAD CIRCUMFERENCE: NORMAL

## 2023-06-06 PROCEDURE — G8417 CALC BMI ABV UP PARAM F/U: HCPCS | Performed by: OBSTETRICS & GYNECOLOGY

## 2023-06-06 PROCEDURE — G8427 DOCREV CUR MEDS BY ELIG CLIN: HCPCS | Performed by: OBSTETRICS & GYNECOLOGY

## 2023-06-06 PROCEDURE — 99244 OFF/OP CNSLTJ NEW/EST MOD 40: CPT | Performed by: OBSTETRICS & GYNECOLOGY

## 2023-06-06 PROCEDURE — 76816 OB US FOLLOW-UP PER FETUS: CPT | Performed by: OBSTETRICS & GYNECOLOGY

## 2023-06-06 PROCEDURE — 76820 UMBILICAL ARTERY ECHO: CPT | Performed by: OBSTETRICS & GYNECOLOGY

## 2023-06-07 ENCOUNTER — HOSPITAL ENCOUNTER (OUTPATIENT)
Dept: PHYSICAL THERAPY | Age: 23
Setting detail: THERAPIES SERIES
Discharge: HOME | End: 2023-06-07
Payer: COMMERCIAL

## 2023-06-07 ENCOUNTER — ROUTINE PRENATAL (OUTPATIENT)
Dept: OBGYN CLINIC | Age: 23
End: 2023-06-07

## 2023-06-07 VITALS
BODY MASS INDEX: 36.95 KG/M2 | WEIGHT: 243 LBS | SYSTOLIC BLOOD PRESSURE: 120 MMHG | DIASTOLIC BLOOD PRESSURE: 76 MMHG | HEART RATE: 71 BPM

## 2023-06-07 DIAGNOSIS — J45.909 ASTHMA AFFECTING PREGNANCY IN SECOND TRIMESTER: ICD-10-CM

## 2023-06-07 DIAGNOSIS — E28.2 PCO (POLYCYSTIC OVARIES): ICD-10-CM

## 2023-06-07 DIAGNOSIS — E03.9 HYPOTHYROIDISM AFFECTING PREGNANCY, ANTEPARTUM: ICD-10-CM

## 2023-06-07 DIAGNOSIS — O99.280 HYPOTHYROIDISM AFFECTING PREGNANCY, ANTEPARTUM: ICD-10-CM

## 2023-06-07 DIAGNOSIS — Z83.3 FAMILY HISTORY OF DIABETES MELLITUS (DM): ICD-10-CM

## 2023-06-07 DIAGNOSIS — O99.512 ASTHMA AFFECTING PREGNANCY IN SECOND TRIMESTER: ICD-10-CM

## 2023-06-07 DIAGNOSIS — O09.92 HRP (HIGH RISK PREGNANCY), SECOND TRIMESTER: Primary | ICD-10-CM

## 2023-06-07 DIAGNOSIS — Z3A.26 26 WEEKS GESTATION OF PREGNANCY: ICD-10-CM

## 2023-06-07 PROCEDURE — 0502F SUBSEQUENT PRENATAL CARE: CPT | Performed by: SPECIALIST

## 2023-06-07 PROCEDURE — 97110 THERAPEUTIC EXERCISES: CPT

## 2023-06-07 PROCEDURE — 97161 PT EVAL LOW COMPLEX 20 MIN: CPT

## 2023-06-07 RX ORDER — METOCLOPRAMIDE 10 MG/1
10 TABLET ORAL 3 TIMES DAILY PRN
Qty: 120 TABLET | Refills: 0 | Status: SHIPPED | OUTPATIENT
Start: 2023-06-07

## 2023-06-07 NOTE — PROGRESS NOTES
Leopoldo Melody is a 25 y.o. female 30w1d        OB History    Para Term  AB Living   1 0 0 0 0 0   SAB IAB Ectopic Molar Multiple Live Births   0 0 0 0 0 0      # Outcome Date GA Lbr Zen/2nd Weight Sex Delivery Anes PTL Lv   1 Current                Chief Complaint   Patient presents with    Routine Prenatal Visit        Blood pressure 120/76, pulse 71, weight 243 lb (110.2 kg), last menstrual period 2022, not currently breastfeeding. The patient was seen and evaluated. There was positive fetal movements. No contractions or leakage of fluid. Signs and symptoms of  labor as well as labor were reviewed. The patient's anatomy ultrasound has been completed at [facility]. The S/S of Pre-Eclampsia were reviewed with the patient in detail. She is to report any of these if they occur. She currently denies any of these. The patient is RH positive Rhogam Ordered: Not indicated. Patient Active Problem List    Diagnosis Date Noted    14 weeks gestation of pregnancy 2023     Priority: Medium    HRP (high risk pregnancy), second trimester 02/15/2023     Priority: Medium    Hypothyroidism affecting pregnancy, antepartum 02/15/2023     Priority: Medium    Irritable bowel syndrome with both constipation and diarrhea 2020    PCOS (polycystic ovarian syndrome) 2020    Amenorrhea 2020    Pelvic pain in female 2020        Diagnosis Orders   1. HRP (high risk pregnancy), second trimester        2. 26 weeks gestation of pregnancy        3. Hypothyroidism affecting pregnancy, antepartum        4. PCO (polycystic ovaries)        5. Asthma affecting pregnancy in second trimester        6.  Family history of diabetes mellitus (DM)          Orders Placed This Encounter   Medications    metoclopramide (REGLAN) 10 MG tablet     Sig: Take 1 tablet by mouth 3 times daily as needed (nausea vomiting)     Dispense:  120 tablet     Refill:  0      Patient complains of back pain,

## 2023-06-07 NOTE — THERAPY EVALUATION
minutes   33766 [] Dry Needling, 3 or more muscles  06489       Frequency: 1-2x/week for 10 visits    Todays Treatment:  Modalities:   Precautions: 2nd trimester pregnancy  Exercises:  Exercise Reps/ Time Weight/ Level Comments   Bridges 10x  Initial HEP. Head of bed elevated   Supine BKFOs 10x R/L Red Initial HEP. Head of bed elevated   Supine sciatic nerve glides in 90-90 (L) 2'' hold x 15  Initial HEP. Head of bed elevated   Seated piriformis stretch 30''  Initial HEP   Manual 2'  MET correction for L anterior innominate in supine  Shotgun MET in supine   Other:    Specific Instructions for next treatment: Manual as needed to address soft tissue restriction in L glutes/piriformis. Support pelvic alignment symmetry with manual as needed as well as follow up stabilization exercises/neuro re-ed. Work to centralize radiating sx into L LE. Treatment Charges: Mins Units   [x] Evaluation       [x]  Low       []  Moderate       []  High 41 1   []  Modalities     [x]  Ther Exercise 8 1   [x]  Manual Therapy 2 0   []  Ther Activities     []  Aquatics     []  Neuromuscular     []  Gait Training     []  Dry Needling           1-2 muscles     []  Dry Needling           3 or more muscles     [] Vasocompression     []  Other       TOTAL TREATMENT TIME: 51    Time in: 4:02pm    Time Out: 4:53pm    Electronically signed by: Brenna Boykin PT        Physician Signature:________________________________Date:__________________  By signing above or cosigning this note, I have reviewed this plan of care and certify a need for medically necessary rehabilitation services.      *PLEASE SIGN ABOVE AND FAX BACK ALL PAGES*

## 2023-06-09 ENCOUNTER — HOSPITAL ENCOUNTER (OUTPATIENT)
Age: 23
Discharge: HOME | End: 2023-06-09
Payer: COMMERCIAL

## 2023-06-09 LAB
T4 FREE SERPL-MCNC: 1.1 NG/DL (ref 0.9–1.7)
TSH SERPL-MCNC: 1.17 UIU/ML (ref 0.3–5)

## 2023-06-09 PROCEDURE — 84439 ASSAY OF FREE THYROXINE: CPT

## 2023-06-09 PROCEDURE — 36415 COLL VENOUS BLD VENIPUNCTURE: CPT

## 2023-06-09 PROCEDURE — 84443 ASSAY THYROID STIM HORMONE: CPT

## 2023-06-20 ENCOUNTER — HOSPITAL ENCOUNTER (OUTPATIENT)
Dept: PHYSICAL THERAPY | Age: 23
Setting detail: THERAPIES SERIES
Discharge: HOME | End: 2023-06-20
Payer: COMMERCIAL

## 2023-06-20 PROCEDURE — 97140 MANUAL THERAPY 1/> REGIONS: CPT

## 2023-06-20 PROCEDURE — 97110 THERAPEUTIC EXERCISES: CPT

## 2023-06-20 NOTE — FLOWSHEET NOTE
[x] [facility]) - [facility]  [address]  [place]: [phone]   F: [phone]    Physical Therapy Daily Treatment Note    Date:  2023  Patient Name:  John Cassidy    :  2000  MRN: [id number]  Physician: HILDA Gomez-CNP     Insurance: [employer]. 20 visit HARD MAX/calendar year  Diagnosis: M54.42 (ICD-10-CM) - Acute left-sided low back pain with left-sided sciatica  Rehab Codes: M54.42, M25.60, R53.1     Onset Date: May 2023   Next Dr. Bryon Graff: 23  Visit# / total visits: 4/10   Cancels/No Shows: 0/0    Subjective:  :  Pt states that her pain was significantly aggravated after last visit. Otherwise was able to calm it down with some of her other home exercises over the weekend. 5/10 pain currently, mostly in L calf today. Pain:  [x] Yes  [] No Location: L calf Pain Rating: (0-10 scale) 5/10  Pain altered Tx:  [] No  [] Yes  Action:  Comments:    Objective:  Modalities:   Precautions: Third trimester pregnancy  Exercises:  Exercise Reps/ Time Weight/ Level Completed  Today Comments   Manual 10'  x MFR to L glute max/med, piriformis, distal lateral hamstrings, proximal lateral gastroc   Manual- sciatic nerve floss 5'   Pt in side lying with affected hip on top. R hip flexed to 90 degrees and ADD with knee extended. Applied direct inhibition to piriformis while patient performs ankle pumps 10x. Seated piriformis stretch     Attempted but could not tolerate. Seated hamstring stretch 2x 30\"  x  to pt's tolerance as she is not able to fully extend her knee when seated.    Supine gastroc stretch   strap     Bridges 10 reps x 2 Red x Head of bed slightly elevated  Added band    Supine sciatic nerve glides in 90-90 (L) 2'' hold 10 reps x 2  x Head of bed slightly elevated   Supine BKFOs 20 reps total x 2 Red x L/R alt  Head of bed slightly elevated   Clams (L only) 15 reps x 2 Red x    Seated LAQ/sciatic nerve floss (L only) 15x  x

## 2023-06-21 ENCOUNTER — HOSPITAL ENCOUNTER (OUTPATIENT)
Age: 23
Setting detail: SPECIMEN
Discharge: HOME | End: 2023-06-21

## 2023-06-21 ENCOUNTER — ROUTINE PRENATAL (OUTPATIENT)
Dept: OBGYN CLINIC | Age: 23
End: 2023-06-21

## 2023-06-21 VITALS
SYSTOLIC BLOOD PRESSURE: 122 MMHG | DIASTOLIC BLOOD PRESSURE: 84 MMHG | BODY MASS INDEX: 37.4 KG/M2 | WEIGHT: 246 LBS | HEART RATE: 99 BPM

## 2023-06-21 DIAGNOSIS — O99.513 ASTHMA AFFECTING PREGNANCY IN THIRD TRIMESTER: ICD-10-CM

## 2023-06-21 DIAGNOSIS — O09.93 HRP (HIGH RISK PREGNANCY), THIRD TRIMESTER: Primary | ICD-10-CM

## 2023-06-21 DIAGNOSIS — J45.909 ASTHMA AFFECTING PREGNANCY IN THIRD TRIMESTER: ICD-10-CM

## 2023-06-21 DIAGNOSIS — E28.2 PCO (POLYCYSTIC OVARIES): ICD-10-CM

## 2023-06-21 DIAGNOSIS — E03.9 HYPOTHYROIDISM AFFECTING PREGNANCY, ANTEPARTUM: ICD-10-CM

## 2023-06-21 DIAGNOSIS — Z83.3 FAMILY HISTORY OF DIABETES MELLITUS (DM): ICD-10-CM

## 2023-06-21 DIAGNOSIS — O99.280 HYPOTHYROIDISM AFFECTING PREGNANCY, ANTEPARTUM: ICD-10-CM

## 2023-06-21 DIAGNOSIS — Z3A.28 28 WEEKS GESTATION OF PREGNANCY: ICD-10-CM

## 2023-06-21 NOTE — PROGRESS NOTES
Patient was in the office today for a CBC, T PALL, GTT. Draw per physician order using sterile technique. Drawn from the RIGHT ANTECUBITAL. Patient was given TDAP in the Right Deltoid. NDC# [id number]  LOT# [id number]  Exp date- 09.2025  Patient's last injection was N/A. Patient's last annual exam was on N/A. Patient tolerated well without difficulty.

## 2023-06-21 NOTE — PROGRESS NOTES
Ta Glaser is a 25 y.o. female 28w4d        OB History    Para Term  AB Living   1 0 0 0 0 0   SAB IAB Ectopic Molar Multiple Live Births   0 0 0 0 0 0      # Outcome Date GA Lbr Zen/2nd Weight Sex Delivery Anes PTL Lv   1 Current                Blood pressure 122/84, pulse 99, weight 246 lb (111.6 kg), last menstrual period 2022, not currently breastfeeding. The patient was seen and evaluated. There was positive fetal movements. No contractions or leakage of fluid. Signs and symptoms of  labor as well as labor were reviewed. The S/S of Pre-Eclampsia were reviewed with the patient in detail. She is to report any of these if they occur. She currently denies any of these. The patient had her 28 week labs in process. The patient was instructed on fetal kick counts and was given a kick sheet to complete every 8 hours. She was instructed that the baby should move at a minimum of ten times within one hour after a meal. The patient was instructed to lay down on her left side twenty minutes after eating and count movements for up to one hour with a target value of ten movements. She was instructed to notify the office if she did not make that target after two attempts or if after any attempt there was less than four movements. The patient reports that the targets have been made Yes. Patient Active Problem List    Diagnosis Date Noted    14 weeks gestation of pregnancy 2023     Priority: Medium    HRP (high risk pregnancy), second trimester 02/15/2023     Priority: Medium    Hypothyroidism affecting pregnancy, antepartum 02/15/2023     Priority: Medium    Irritable bowel syndrome with both constipation and diarrhea 2020    PCOS (polycystic ovarian syndrome) 2020    Amenorrhea 2020    Pelvic pain in female 2020        Diagnosis Orders   1.  HRP (high risk pregnancy), third trimester        2. 28 weeks gestation of pregnancy  CBC with Auto

## 2023-06-22 LAB
BASOPHILS # BLD: 0.04 K/UL (ref 0–0.2)
BASOPHILS NFR BLD: 0 % (ref 0–2)
EOSINOPHIL # BLD: 0.55 K/UL (ref 0–0.44)
EOSINOPHILS RELATIVE PERCENT: 4 % (ref 1–4)
ERYTHROCYTE [DISTWIDTH] IN BLOOD BY AUTOMATED COUNT: 12.6 % (ref 11.8–14.4)
GLUCOSE 1H P 50 G GLC PO SERPL-MCNC: 116 MG/DL (ref 70–135)
GLUCOSE ADMINISTRATION: NORMAL
HCT VFR BLD AUTO: 38.4 % (ref 36.3–47.1)
HGB BLD-MCNC: 12.5 G/DL (ref 11.9–15.1)
IMM GRANULOCYTES # BLD AUTO: 0.05 K/UL (ref 0–0.3)
IMM GRANULOCYTES NFR BLD: 0 %
LYMPHOCYTES # BLD: 18 % (ref 24–43)
LYMPHOCYTES NFR BLD: 2.66 K/UL (ref 1.1–3.7)
MCH RBC QN AUTO: 32.1 PG (ref 25.2–33.5)
MCHC RBC AUTO-ENTMCNC: 32.6 G/DL (ref 28.4–34.8)
MCV RBC AUTO: 98.7 FL (ref 82.6–102.9)
MONOCYTES NFR BLD: 0.84 K/UL (ref 0.1–1.2)
MONOCYTES NFR BLD: 6 % (ref 3–12)
NEUTROPHILS NFR BLD: 72 % (ref 36–65)
NEUTS SEG NFR BLD: 10.77 K/UL (ref 1.5–8.1)
NRBC AUTOMATED: 0 PER 100 WBC
PLATELET # BLD AUTO: 290 K/UL (ref 138–453)
PMV BLD AUTO: 11.3 FL (ref 8.1–13.5)
RBC # BLD AUTO: 3.89 M/UL (ref 3.95–5.11)
T PALLIDUM AB SER QL IA: NONREACTIVE
WBC OTHER # BLD: 14.9 K/UL (ref 3.5–11.3)

## 2023-06-23 ENCOUNTER — HOSPITAL ENCOUNTER (OUTPATIENT)
Dept: PHYSICAL THERAPY | Age: 23
Setting detail: THERAPIES SERIES
Discharge: HOME | End: 2023-06-23
Payer: COMMERCIAL

## 2023-06-23 PROCEDURE — 97110 THERAPEUTIC EXERCISES: CPT

## 2023-06-23 PROCEDURE — 97140 MANUAL THERAPY 1/> REGIONS: CPT

## 2023-06-23 NOTE — FLOWSHEET NOTE
[x] Formerly [facility]) - [facility] & Therapy  [address]  [place]: [phone]   F: [phone]    Physical Therapy Daily Treatment Note    Date:  2023  Patient Name:  Santa Gomez    :  2000  MRN: [id number]  Physician: HILDA Montes-CNP     Insurance: [employer] [place]. 20 visit HARD MAX/calendar year  Diagnosis: M54.42 (ICD-10-CM) - Acute left-sided low back pain with left-sided sciatica  Rehab Codes: M54.42, M25.60, R53.1     Onset Date: May 2023   Next Dr. Nalini Solorio: 23  Visit# / total visits: 5/10   Cancels/No Shows: 0/0    Subjective:  : Patient reporting that she was fine until she had to put her shoes on this morning. Pain:  [x] Yes  [] No Location: L calf Pain Rating: (0-10 scale) 6/10  Pain altered Tx:  [] No  [] Yes  Action:  Comments:    Objective:  Modalities:   Precautions: Third trimester pregnancy  Exercises:  Exercise Reps/ Time Weight/ Level Completed  Today Comments   Manual 15'  x MFR to L glute max/med, piriformis, distal lateral hamstrings, proximal lateral gastroc- roller   Manual- sciatic nerve floss 5'   Pt in side lying with affected hip on top. R hip flexed to 90 degrees and ADD with knee extended. Applied direct inhibition to piriformis while patient performs ankle pumps 10x. Seated piriformis stretch     Attempted but could not tolerate. Seated hamstring stretch 2x 20\"  x  to pt's tolerance as she is not able to fully extend her knee when seated. Supine gastroc stretch  2x 20\" strap x    Bridges 10 reps x 2 Red x Head of bed slightly elevated  Added band    Supine sciatic nerve glides in 90-90 (L) 2'' hold 10 reps x 2  x Head of bed slightly elevated   attempted but could not tolerate this session. Supine BKFOs 20 reps total x 2 Red x L/R alt  Head of bed slightly elevated   Clams (L only) 15 reps x 2 Red x  Inc tightness.    Seated LAQ/sciatic nerve floss (L only) 15x      Standing R foot taps to 10'' step

## 2023-06-27 ENCOUNTER — HOSPITAL ENCOUNTER (OUTPATIENT)
Dept: PHYSICAL THERAPY | Age: 23
Setting detail: THERAPIES SERIES
Discharge: HOME | End: 2023-06-27
Payer: COMMERCIAL

## 2023-06-27 PROCEDURE — 97110 THERAPEUTIC EXERCISES: CPT

## 2023-06-27 PROCEDURE — 97140 MANUAL THERAPY 1/> REGIONS: CPT

## 2023-06-30 ENCOUNTER — APPOINTMENT (OUTPATIENT)
Dept: PHYSICAL THERAPY | Age: 23
End: 2023-06-30
Payer: COMMERCIAL

## 2023-07-05 ENCOUNTER — HOSPITAL ENCOUNTER (OUTPATIENT)
Age: 23
Setting detail: SPECIMEN
Discharge: HOME | End: 2023-07-05

## 2023-07-05 ENCOUNTER — ROUTINE PRENATAL (OUTPATIENT)
Dept: OBGYN CLINIC | Age: 23
End: 2023-07-05

## 2023-07-05 VITALS
BODY MASS INDEX: 37.4 KG/M2 | DIASTOLIC BLOOD PRESSURE: 74 MMHG | SYSTOLIC BLOOD PRESSURE: 124 MMHG | HEART RATE: 105 BPM | WEIGHT: 246 LBS

## 2023-07-05 DIAGNOSIS — J45.909 ASTHMA AFFECTING PREGNANCY IN THIRD TRIMESTER: ICD-10-CM

## 2023-07-05 DIAGNOSIS — O99.513 ASTHMA AFFECTING PREGNANCY IN THIRD TRIMESTER: ICD-10-CM

## 2023-07-05 DIAGNOSIS — O99.280 HYPOTHYROIDISM AFFECTING PREGNANCY, ANTEPARTUM: ICD-10-CM

## 2023-07-05 DIAGNOSIS — O09.93 HRP (HIGH RISK PREGNANCY), THIRD TRIMESTER: Primary | ICD-10-CM

## 2023-07-05 DIAGNOSIS — E03.9 HYPOTHYROIDISM AFFECTING PREGNANCY, ANTEPARTUM: ICD-10-CM

## 2023-07-05 DIAGNOSIS — O09.93 HRP (HIGH RISK PREGNANCY), THIRD TRIMESTER: ICD-10-CM

## 2023-07-05 DIAGNOSIS — E28.2 PCO (POLYCYSTIC OVARIES): ICD-10-CM

## 2023-07-05 DIAGNOSIS — Z3A.30 30 WEEKS GESTATION OF PREGNANCY: ICD-10-CM

## 2023-07-05 DIAGNOSIS — Z83.3 FAMILY HISTORY OF DIABETES MELLITUS (DM): ICD-10-CM

## 2023-07-05 NOTE — PROGRESS NOTES
Quentin Tolentino is a 25 y.o. female 30w4d        OB History    Para Term  AB Living   1 0 0 0 0 0   SAB IAB Ectopic Molar Multiple Live Births   0 0 0 0 0 0      # Outcome Date GA Lbr Zen/2nd Weight Sex Delivery Anes PTL Lv   1 Current                Chief Complaint   Patient presents with    Routine Prenatal Visit        Blood pressure 124/74, pulse (!) 105, weight 246 lb (111.6 kg), last menstrual period 2022, not currently breastfeeding. The patient was seen and evaluated. There was positive fetal movements. No contractions or leakage of fluid. Signs and symptoms of  labor as well as labor were reviewed. The S/S of Pre-Eclampsia were reviewed with the patient in detail. She is to report any of these if they occur. She currently denies any of these. The patient had her 28 week labs completed. The patient was instructed on fetal kick counts and was given a kick sheet to complete every 8 hours. She was instructed that the baby should move at a minimum of ten times within one hour after a meal. The patient was instructed to lay down on her left side twenty minutes after eating and count movements for up to one hour with a target value of ten movements. She was instructed to notify the office if she did not make that target after two attempts or if after any attempt there was less than four movements. The patient reports that the targets have been made Yes. Patient Active Problem List    Diagnosis Date Noted    14 weeks gestation of pregnancy 2023     Priority: Medium    HRP (high risk pregnancy), second trimester 02/15/2023     Priority: Medium    Hypothyroidism affecting pregnancy, antepartum 02/15/2023     Priority: Medium    Irritable bowel syndrome with both constipation and diarrhea 2020    PCOS (polycystic ovarian syndrome) 2020    Amenorrhea 2020    Pelvic pain in female 2020        Diagnosis Orders   1.  HRP (high risk pregnancy),

## 2023-07-05 NOTE — PROGRESS NOTES
Patient was in the office today for a TSH, T4. Draw per physician order using sterile technique. Drawn from the LEFT ANTECUBITAL.

## 2023-07-06 LAB
T4 FREE SERPL-MCNC: 1.2 NG/DL (ref 0.9–1.7)
TSH SERPL DL<=0.05 MIU/L-ACNC: 1.04 UIU/ML (ref 0.3–5)

## 2023-07-10 ENCOUNTER — ROUTINE PRENATAL (OUTPATIENT)
Dept: PERINATAL CARE | Age: 23
End: 2023-07-10
Payer: COMMERCIAL

## 2023-07-10 ENCOUNTER — TELEPHONE (OUTPATIENT)
Dept: OBGYN CLINIC | Age: 23
End: 2023-07-10

## 2023-07-10 VITALS
HEART RATE: 105 BPM | RESPIRATION RATE: 16 BRPM | HEIGHT: 68 IN | DIASTOLIC BLOOD PRESSURE: 67 MMHG | TEMPERATURE: 97.4 F | BODY MASS INDEX: 37.28 KG/M2 | SYSTOLIC BLOOD PRESSURE: 119 MMHG | WEIGHT: 246 LBS

## 2023-07-10 DIAGNOSIS — O36.5930 INTRAUTERINE GROWTH RESTRICTION (IUGR) AFFECTING CARE OF MOTHER, THIRD TRIMESTER, SINGLE GESTATION: Primary | ICD-10-CM

## 2023-07-10 DIAGNOSIS — O99.213 OBESITY AFFECTING PREGNANCY IN THIRD TRIMESTER: ICD-10-CM

## 2023-07-10 DIAGNOSIS — Z36.4 ULTRASOUND FOR ANTENATAL SCREENING FOR FETAL GROWTH RESTRICTION: ICD-10-CM

## 2023-07-10 DIAGNOSIS — Z13.89 ENCOUNTER FOR ROUTINE SCREENING FOR MALFORMATION USING ULTRASONICS: ICD-10-CM

## 2023-07-10 DIAGNOSIS — O36.5939 IUGR (INTRAUTERINE GROWTH RESTRICTION) AFFECTING CARE OF MOTHER, THIRD TRIMESTER, OTHER FETUS: Primary | ICD-10-CM

## 2023-07-10 DIAGNOSIS — O09.93 HRP (HIGH RISK PREGNANCY), THIRD TRIMESTER: ICD-10-CM

## 2023-07-10 DIAGNOSIS — O16.3 HYPERTENSION AFFECTING PREGNANCY IN THIRD TRIMESTER: ICD-10-CM

## 2023-07-10 DIAGNOSIS — O35.BXX0 FETAL VENTRICULAR SEPTAL DEFECT AFFECTING ANTEPARTUM CARE OF MOTHER, SINGLE OR UNSPECIFIED FETUS: ICD-10-CM

## 2023-07-10 DIAGNOSIS — O99.283 HYPOTHYROIDISM AFFECTING PREGNANCY IN THIRD TRIMESTER: ICD-10-CM

## 2023-07-10 DIAGNOSIS — Z3A.31 31 WEEKS GESTATION OF PREGNANCY: ICD-10-CM

## 2023-07-10 DIAGNOSIS — E03.9 HYPOTHYROIDISM AFFECTING PREGNANCY IN THIRD TRIMESTER: ICD-10-CM

## 2023-07-10 PROCEDURE — 99999 PR OFFICE/OUTPT VISIT,PROCEDURE ONLY: CPT | Performed by: OBSTETRICS & GYNECOLOGY

## 2023-07-10 PROCEDURE — 76821 MIDDLE CEREBRAL ARTERY ECHO: CPT | Performed by: OBSTETRICS & GYNECOLOGY

## 2023-07-10 PROCEDURE — 76805 OB US >/= 14 WKS SNGL FETUS: CPT | Performed by: OBSTETRICS & GYNECOLOGY

## 2023-07-10 PROCEDURE — 76820 UMBILICAL ARTERY ECHO: CPT | Performed by: OBSTETRICS & GYNECOLOGY

## 2023-07-10 PROCEDURE — 76819 FETAL BIOPHYS PROFIL W/O NST: CPT | Performed by: OBSTETRICS & GYNECOLOGY

## 2023-07-10 NOTE — PROGRESS NOTES
Obstetric/Gynecology Maternal Fetal Medicine Resident Note    Patient is amenable to  formal consult with MFM attending physician for new findings of lagging [facility] [place] and suspected VSDs.      Vivian Varela DO  OBGYKIM Resident, PGY2  [facility]  7/10/2023, 12:00 PM

## 2023-07-17 ENCOUNTER — ROUTINE PRENATAL (OUTPATIENT)
Dept: OBGYN CLINIC | Age: 23
End: 2023-07-17
Payer: COMMERCIAL

## 2023-07-17 VITALS
DIASTOLIC BLOOD PRESSURE: 70 MMHG | BODY MASS INDEX: 37.8 KG/M2 | HEART RATE: 101 BPM | WEIGHT: 248.6 LBS | SYSTOLIC BLOOD PRESSURE: 110 MMHG

## 2023-07-17 DIAGNOSIS — O99.280 HYPOTHYROIDISM AFFECTING PREGNANCY, ANTEPARTUM: ICD-10-CM

## 2023-07-17 DIAGNOSIS — O09.93 HRP (HIGH RISK PREGNANCY), THIRD TRIMESTER: Primary | ICD-10-CM

## 2023-07-17 DIAGNOSIS — Z3A.32 32 WEEKS GESTATION OF PREGNANCY: ICD-10-CM

## 2023-07-17 DIAGNOSIS — O16.3 HYPERTENSION AFFECTING PREGNANCY IN THIRD TRIMESTER: ICD-10-CM

## 2023-07-17 DIAGNOSIS — E03.9 HYPOTHYROIDISM AFFECTING PREGNANCY, ANTEPARTUM: ICD-10-CM

## 2023-07-17 DIAGNOSIS — O36.5939 IUGR (INTRAUTERINE GROWTH RESTRICTION) AFFECTING CARE OF MOTHER, THIRD TRIMESTER, OTHER FETUS: ICD-10-CM

## 2023-07-17 PROBLEM — O36.5990 POOR FETAL GROWTH AFFECTING MANAGEMENT OF MOTHER, ANTEPARTUM: Status: ACTIVE | Noted: 2023-07-17

## 2023-07-17 PROBLEM — O16.9 HYPERTENSION AFFECTING PREGNANCY, ANTEPARTUM: Status: ACTIVE | Noted: 2023-07-17

## 2023-07-17 PROCEDURE — 1036F TOBACCO NON-USER: CPT | Performed by: SPECIALIST

## 2023-07-17 PROCEDURE — G8417 CALC BMI ABV UP PARAM F/U: HCPCS | Performed by: SPECIALIST

## 2023-07-17 PROCEDURE — 0502F SUBSEQUENT PRENATAL CARE: CPT | Performed by: SPECIALIST

## 2023-07-17 PROCEDURE — 59025 FETAL NON-STRESS TEST: CPT | Performed by: SPECIALIST

## 2023-07-17 PROCEDURE — G8427 DOCREV CUR MEDS BY ELIG CLIN: HCPCS | Performed by: SPECIALIST

## 2023-07-18 ENCOUNTER — ROUTINE PRENATAL (OUTPATIENT)
Dept: PERINATAL CARE | Age: 23
End: 2023-07-18
Payer: COMMERCIAL

## 2023-07-18 VITALS
RESPIRATION RATE: 16 BRPM | DIASTOLIC BLOOD PRESSURE: 80 MMHG | HEIGHT: 68 IN | SYSTOLIC BLOOD PRESSURE: 132 MMHG | HEART RATE: 91 BPM | TEMPERATURE: 97.3 F | BODY MASS INDEX: 38.34 KG/M2 | WEIGHT: 253 LBS

## 2023-07-18 DIAGNOSIS — O99.283 HYPOTHYROIDISM AFFECTING PREGNANCY IN THIRD TRIMESTER: ICD-10-CM

## 2023-07-18 DIAGNOSIS — O99.213 OBESITY AFFECTING PREGNANCY IN THIRD TRIMESTER: ICD-10-CM

## 2023-07-18 DIAGNOSIS — E03.9 HYPOTHYROIDISM AFFECTING PREGNANCY IN THIRD TRIMESTER: ICD-10-CM

## 2023-07-18 DIAGNOSIS — O16.3 HYPERTENSION AFFECTING PREGNANCY IN THIRD TRIMESTER: ICD-10-CM

## 2023-07-18 DIAGNOSIS — O36.5930 INTRAUTERINE GROWTH RESTRICTION (IUGR) AFFECTING CARE OF MOTHER, THIRD TRIMESTER, SINGLE GESTATION: Primary | ICD-10-CM

## 2023-07-18 DIAGNOSIS — O35.BXX0 FETAL VENTRICULAR SEPTAL DEFECT AFFECTING ANTEPARTUM CARE OF MOTHER, SINGLE OR UNSPECIFIED FETUS: ICD-10-CM

## 2023-07-18 PROCEDURE — 76819 FETAL BIOPHYS PROFIL W/O NST: CPT | Performed by: OBSTETRICS & GYNECOLOGY

## 2023-07-18 PROCEDURE — 99999 PR OFFICE/OUTPT VISIT,PROCEDURE ONLY: CPT | Performed by: OBSTETRICS & GYNECOLOGY

## 2023-07-18 PROCEDURE — 76815 OB US LIMITED FETUS(S): CPT | Performed by: OBSTETRICS & GYNECOLOGY

## 2023-07-18 PROCEDURE — 76820 UMBILICAL ARTERY ECHO: CPT | Performed by: OBSTETRICS & GYNECOLOGY

## 2023-07-18 PROCEDURE — 76821 MIDDLE CEREBRAL ARTERY ECHO: CPT | Performed by: OBSTETRICS & GYNECOLOGY

## 2023-07-24 ENCOUNTER — ROUTINE PRENATAL (OUTPATIENT)
Dept: OBGYN CLINIC | Age: 23
End: 2023-07-24
Payer: COMMERCIAL

## 2023-07-24 VITALS
WEIGHT: 252 LBS | BODY MASS INDEX: 38.32 KG/M2 | DIASTOLIC BLOOD PRESSURE: 76 MMHG | SYSTOLIC BLOOD PRESSURE: 120 MMHG | HEART RATE: 85 BPM

## 2023-07-24 DIAGNOSIS — O09.93 HRP (HIGH RISK PREGNANCY), THIRD TRIMESTER: Primary | ICD-10-CM

## 2023-07-24 DIAGNOSIS — Z3A.33 33 WEEKS GESTATION OF PREGNANCY: ICD-10-CM

## 2023-07-24 PROCEDURE — G8417 CALC BMI ABV UP PARAM F/U: HCPCS | Performed by: SPECIALIST

## 2023-07-24 PROCEDURE — G8427 DOCREV CUR MEDS BY ELIG CLIN: HCPCS | Performed by: SPECIALIST

## 2023-07-24 PROCEDURE — 59025 FETAL NON-STRESS TEST: CPT | Performed by: SPECIALIST

## 2023-07-24 PROCEDURE — 0502F SUBSEQUENT PRENATAL CARE: CPT | Performed by: SPECIALIST

## 2023-07-24 PROCEDURE — 1036F TOBACCO NON-USER: CPT | Performed by: SPECIALIST

## 2023-07-24 NOTE — PROGRESS NOTES
Shiloh Pulido is a 21 y.o. female 33w2d        OB History    Para Term  AB Living   1 0 0 0 0 0   SAB IAB Ectopic Molar Multiple Live Births   0 0 0 0 0 0      # Outcome Date GA Lbr Zen/2nd Weight Sex Delivery Anes PTL Lv   1 Current                Chief Complaint   Patient presents with    Routine Prenatal Visit        Blood pressure 120/76, pulse 85, weight 252 lb (114.3 kg), last menstrual period 2022, not currently breastfeeding. The patient was seen and evaluated. There was positive fetal movements. No contractions or leakage of fluid. Signs and symptoms of  labor as well as labor were reviewed. The S/S of Pre-Eclampsia were reviewed with the patient in detail. She is to report any of these if they occur. She currently denies any of these. The patient had her 28 week labs completed. The patient was instructed on fetal kick counts and was given a kick sheet to complete every 8 hours. She was instructed that the baby should move at a minimum of ten times within one hour after a meal. The patient was instructed to lay down on her left side twenty minutes after eating and count movements for up to one hour with a target value of ten movements. She was instructed to notify the office if she did not make that target after two attempts or if after any attempt there was less than four movements. The patient reports that the targets have been made Yes.     Patient Active Problem List    Diagnosis Date Noted    14 weeks gestation of pregnancy 2023     Priority: Medium    HRP (high risk pregnancy), second trimester 02/15/2023     Priority: Medium    Hypothyroidism affecting pregnancy, antepartum 02/15/2023     Priority: Medium    Hypertension affecting pregnancy, antepartum 2023    Poor fetal growth affecting management of mother, antepartum 2023    Suspected Fetal VSD 07/10/2023     7/10/23: suspected fetal VSD x 2 on MFM US   Fetal echo to be scheduled

## 2023-07-27 ENCOUNTER — ROUTINE PRENATAL (OUTPATIENT)
Dept: PERINATAL CARE | Age: 23
End: 2023-07-27
Payer: COMMERCIAL

## 2023-07-27 VITALS
BODY MASS INDEX: 38.04 KG/M2 | HEART RATE: 92 BPM | DIASTOLIC BLOOD PRESSURE: 76 MMHG | RESPIRATION RATE: 16 BRPM | WEIGHT: 251 LBS | SYSTOLIC BLOOD PRESSURE: 120 MMHG | TEMPERATURE: 98.3 F | HEIGHT: 68 IN

## 2023-07-27 DIAGNOSIS — E03.9 HYPOTHYROIDISM AFFECTING PREGNANCY IN THIRD TRIMESTER: ICD-10-CM

## 2023-07-27 DIAGNOSIS — O41.93X0 ABNORMAL AMNIOTIC FLUID IN THIRD TRIMESTER, SINGLE OR UNSPECIFIED FETUS: ICD-10-CM

## 2023-07-27 DIAGNOSIS — Z3A.33 33 WEEKS GESTATION OF PREGNANCY: ICD-10-CM

## 2023-07-27 DIAGNOSIS — O99.283 HYPOTHYROIDISM AFFECTING PREGNANCY IN THIRD TRIMESTER: ICD-10-CM

## 2023-07-27 DIAGNOSIS — O35.BXX0 FETAL VENTRICULAR SEPTAL DEFECT AFFECTING ANTEPARTUM CARE OF MOTHER, SINGLE OR UNSPECIFIED FETUS: ICD-10-CM

## 2023-07-27 DIAGNOSIS — O16.3 HYPERTENSION AFFECTING PREGNANCY IN THIRD TRIMESTER: ICD-10-CM

## 2023-07-27 DIAGNOSIS — O36.5930 INTRAUTERINE GROWTH RESTRICTION (IUGR) AFFECTING CARE OF MOTHER, THIRD TRIMESTER, SINGLE GESTATION: Primary | ICD-10-CM

## 2023-07-27 DIAGNOSIS — O99.213 OBESITY AFFECTING PREGNANCY IN THIRD TRIMESTER: ICD-10-CM

## 2023-07-27 PROCEDURE — 76815 OB US LIMITED FETUS(S): CPT | Performed by: OBSTETRICS & GYNECOLOGY

## 2023-07-27 PROCEDURE — 76820 UMBILICAL ARTERY ECHO: CPT | Performed by: OBSTETRICS & GYNECOLOGY

## 2023-07-27 PROCEDURE — 76819 FETAL BIOPHYS PROFIL W/O NST: CPT | Performed by: OBSTETRICS & GYNECOLOGY

## 2023-07-27 PROCEDURE — 76821 MIDDLE CEREBRAL ARTERY ECHO: CPT | Performed by: OBSTETRICS & GYNECOLOGY

## 2023-07-27 PROCEDURE — 99999 PR OFFICE/OUTPT VISIT,PROCEDURE ONLY: CPT | Performed by: OBSTETRICS & GYNECOLOGY

## 2023-07-31 ENCOUNTER — ROUTINE PRENATAL (OUTPATIENT)
Dept: OBGYN CLINIC | Age: 23
End: 2023-07-31
Payer: COMMERCIAL

## 2023-07-31 VITALS
HEART RATE: 112 BPM | DIASTOLIC BLOOD PRESSURE: 72 MMHG | WEIGHT: 250 LBS | BODY MASS INDEX: 38.01 KG/M2 | SYSTOLIC BLOOD PRESSURE: 118 MMHG

## 2023-07-31 DIAGNOSIS — Z3A.34 34 WEEKS GESTATION OF PREGNANCY: ICD-10-CM

## 2023-07-31 DIAGNOSIS — E03.9 HYPOTHYROIDISM AFFECTING PREGNANCY, ANTEPARTUM: ICD-10-CM

## 2023-07-31 DIAGNOSIS — O16.9 HYPERTENSION AFFECTING PREGNANCY, ANTEPARTUM: ICD-10-CM

## 2023-07-31 DIAGNOSIS — O99.280 HYPOTHYROIDISM AFFECTING PREGNANCY, ANTEPARTUM: ICD-10-CM

## 2023-07-31 DIAGNOSIS — O09.93 HRP (HIGH RISK PREGNANCY), THIRD TRIMESTER: Primary | ICD-10-CM

## 2023-07-31 DIAGNOSIS — O36.5939 IUGR (INTRAUTERINE GROWTH RESTRICTION) AFFECTING CARE OF MOTHER, THIRD TRIMESTER, OTHER FETUS: ICD-10-CM

## 2023-07-31 PROCEDURE — 0502F SUBSEQUENT PRENATAL CARE: CPT | Performed by: SPECIALIST

## 2023-07-31 PROCEDURE — G8427 DOCREV CUR MEDS BY ELIG CLIN: HCPCS | Performed by: SPECIALIST

## 2023-07-31 PROCEDURE — G8417 CALC BMI ABV UP PARAM F/U: HCPCS | Performed by: SPECIALIST

## 2023-07-31 PROCEDURE — 59025 FETAL NON-STRESS TEST: CPT | Performed by: SPECIALIST

## 2023-07-31 PROCEDURE — 1036F TOBACCO NON-USER: CPT | Performed by: SPECIALIST

## 2023-07-31 RX ORDER — METOCLOPRAMIDE 10 MG/1
TABLET ORAL
Qty: 120 TABLET | Refills: 0 | Status: SHIPPED | OUTPATIENT
Start: 2023-07-31

## 2023-07-31 NOTE — PROGRESS NOTES
Maria Bonilla is a 21 y.o. female 34w2d        OB History    Para Term  AB Living   1 0 0 0 0 0   SAB IAB Ectopic Molar Multiple Live Births   0 0 0 0 0 0      # Outcome Date GA Lbr Zen/2nd Weight Sex Delivery Anes PTL Lv   1 Current                Chief Complaint   Patient presents with    High Risk Pregnancy        Blood pressure 118/72, pulse (!) 112, weight 250 lb (113.4 kg), last menstrual period 2022, not currently breastfeeding. The patient was seen and evaluated. There was positive fetal movements. No contractions or leakage of fluid. Signs and symptoms of  labor as well as labor were reviewed. The S/S of Pre-Eclampsia were reviewed with the patient in detail. She is to report any of these if they occur. She currently denies any of these. The patient had her 28 week labs completed. The patient was instructed on fetal kick counts and was given a kick sheet to complete every 8 hours. She was instructed that the baby should move at a minimum of ten times within one hour after a meal. The patient was instructed to lay down on her left side twenty minutes after eating and count movements for up to one hour with a target value of ten movements. She was instructed to notify the office if she did not make that target after two attempts or if after any attempt there was less than four movements. The patient reports that the targets have been made Yes.     Patient Active Problem List    Diagnosis Date Noted    14 weeks gestation of pregnancy 2023     Priority: Medium    HRP (high risk pregnancy), second trimester 02/15/2023     Priority: Medium    Hypothyroidism affecting pregnancy, antepartum 02/15/2023     Priority: Medium    Hypertension affecting pregnancy, antepartum 2023    Poor fetal growth affecting management of mother, antepartum 2023    Suspected Fetal VSD 07/10/2023     7/10/23: suspected fetal VSD x 2 on MFM US   Fetal echo to be scheduled

## 2023-08-03 ENCOUNTER — HOSPITAL ENCOUNTER (OUTPATIENT)
Age: 23
Discharge: HOME | End: 2023-08-03
Payer: COMMERCIAL

## 2023-08-03 ENCOUNTER — ROUTINE PRENATAL (OUTPATIENT)
Dept: PERINATAL CARE | Age: 23
End: 2023-08-03
Payer: COMMERCIAL

## 2023-08-03 VITALS
WEIGHT: 252 LBS | HEART RATE: 121 BPM | HEIGHT: 68 IN | BODY MASS INDEX: 38.19 KG/M2 | DIASTOLIC BLOOD PRESSURE: 74 MMHG | SYSTOLIC BLOOD PRESSURE: 112 MMHG

## 2023-08-03 DIAGNOSIS — E03.9 HYPOTHYROIDISM AFFECTING PREGNANCY IN THIRD TRIMESTER: ICD-10-CM

## 2023-08-03 DIAGNOSIS — O36.5930 INTRAUTERINE GROWTH RESTRICTION (IUGR) AFFECTING CARE OF MOTHER, THIRD TRIMESTER, SINGLE GESTATION: ICD-10-CM

## 2023-08-03 DIAGNOSIS — O35.BXX0 FETAL VENTRICULAR SEPTAL DEFECT AFFECTING ANTEPARTUM CARE OF MOTHER, SINGLE OR UNSPECIFIED FETUS: Primary | ICD-10-CM

## 2023-08-03 DIAGNOSIS — O35.BXX0 FETAL CARDIAC ECHOGENIC FOCUS, ANTEPARTUM, SINGLE OR UNSPECIFIED FETUS: ICD-10-CM

## 2023-08-03 DIAGNOSIS — O16.3 HYPERTENSION AFFECTING PREGNANCY IN THIRD TRIMESTER: ICD-10-CM

## 2023-08-03 DIAGNOSIS — Z36.4 ULTRASOUND FOR ANTENATAL SCREENING FOR FETAL GROWTH RESTRICTION: ICD-10-CM

## 2023-08-03 DIAGNOSIS — O99.283 HYPOTHYROIDISM AFFECTING PREGNANCY IN THIRD TRIMESTER: ICD-10-CM

## 2023-08-03 DIAGNOSIS — Z3A.34 34 WEEKS GESTATION OF PREGNANCY: ICD-10-CM

## 2023-08-03 DIAGNOSIS — O35.9XX0 FETAL ABNORMALITY AFFECTING MANAGEMENT OF MOTHER, SINGLE OR UNSPECIFIED FETUS: ICD-10-CM

## 2023-08-03 LAB
ABDOMINAL CIRCUMFERENCE: NORMAL
BIPARIETAL DIAMETER: NORMAL
ESTIMATED FETAL WEIGHT: NORMAL
FEMORAL DIAMETER: NORMAL
HC/AC: NORMAL
HEAD CIRCUMFERENCE: NORMAL
SEND OUT REPORT: NORMAL
T4 FREE SERPL-MCNC: 1 NG/DL (ref 0.9–1.7)
TEST NAME: NORMAL
TSH SERPL DL<=0.05 MIU/L-ACNC: 1.08 UIU/ML (ref 0.3–5)

## 2023-08-03 PROCEDURE — 86778 TOXOPLASMA ANTIBODY IGM: CPT

## 2023-08-03 PROCEDURE — 86747 PARVOVIRUS ANTIBODY: CPT

## 2023-08-03 PROCEDURE — 76820 UMBILICAL ARTERY ECHO: CPT | Performed by: OBSTETRICS & GYNECOLOGY

## 2023-08-03 PROCEDURE — 76819 FETAL BIOPHYS PROFIL W/O NST: CPT | Performed by: OBSTETRICS & GYNECOLOGY

## 2023-08-03 PROCEDURE — G8417 CALC BMI ABV UP PARAM F/U: HCPCS | Performed by: OBSTETRICS & GYNECOLOGY

## 2023-08-03 PROCEDURE — 76816 OB US FOLLOW-UP PER FETUS: CPT | Performed by: OBSTETRICS & GYNECOLOGY

## 2023-08-03 PROCEDURE — 99245 OFF/OP CONSLTJ NEW/EST HI 55: CPT | Performed by: OBSTETRICS & GYNECOLOGY

## 2023-08-03 PROCEDURE — 84443 ASSAY THYROID STIM HORMONE: CPT

## 2023-08-03 PROCEDURE — 36415 COLL VENOUS BLD VENIPUNCTURE: CPT

## 2023-08-03 PROCEDURE — 76827 ECHO EXAM OF FETAL HEART: CPT | Performed by: OBSTETRICS & GYNECOLOGY

## 2023-08-03 PROCEDURE — G8427 DOCREV CUR MEDS BY ELIG CLIN: HCPCS | Performed by: OBSTETRICS & GYNECOLOGY

## 2023-08-03 PROCEDURE — 93325 DOPPLER ECHO COLOR FLOW MAPG: CPT | Performed by: OBSTETRICS & GYNECOLOGY

## 2023-08-03 PROCEDURE — 86777 TOXOPLASMA ANTIBODY: CPT

## 2023-08-03 PROCEDURE — 86644 CMV ANTIBODY: CPT

## 2023-08-03 PROCEDURE — 76821 MIDDLE CEREBRAL ARTERY ECHO: CPT | Performed by: OBSTETRICS & GYNECOLOGY

## 2023-08-03 PROCEDURE — 86645 CMV ANTIBODY IGM: CPT

## 2023-08-03 PROCEDURE — 76825 ECHO EXAM OF FETAL HEART: CPT | Performed by: OBSTETRICS & GYNECOLOGY

## 2023-08-03 PROCEDURE — 84439 ASSAY OF FREE THYROXINE: CPT

## 2023-08-04 LAB
CMV IGG SERPL QL IA: 0.4
CMV IGM SERPL QL IA: 0.7
T GONDII IGG SER-ACNC: <0.5 IU/ML
T GONDII IGM SER-ACNC: 0.65 INDEX

## 2023-08-06 LAB
PARVOVIRUS B19 IGG ANTIBODY: 0.39 IV
PARVOVIRUS B19 IGM ANTIBODY: 0.56 IV

## 2023-08-07 ENCOUNTER — ROUTINE PRENATAL (OUTPATIENT)
Dept: OBGYN CLINIC | Age: 23
End: 2023-08-07
Payer: COMMERCIAL

## 2023-08-07 VITALS
DIASTOLIC BLOOD PRESSURE: 60 MMHG | SYSTOLIC BLOOD PRESSURE: 98 MMHG | HEART RATE: 108 BPM | WEIGHT: 251.2 LBS | BODY MASS INDEX: 38.19 KG/M2

## 2023-08-07 DIAGNOSIS — O16.9 HYPERTENSION AFFECTING PREGNANCY, ANTEPARTUM: ICD-10-CM

## 2023-08-07 DIAGNOSIS — E03.9 HYPOTHYROIDISM AFFECTING PREGNANCY, ANTEPARTUM: ICD-10-CM

## 2023-08-07 DIAGNOSIS — Z3A.35 35 WEEKS GESTATION OF PREGNANCY: ICD-10-CM

## 2023-08-07 DIAGNOSIS — O36.5939 IUGR (INTRAUTERINE GROWTH RESTRICTION) AFFECTING CARE OF MOTHER, THIRD TRIMESTER, OTHER FETUS: ICD-10-CM

## 2023-08-07 DIAGNOSIS — O99.280 HYPOTHYROIDISM AFFECTING PREGNANCY, ANTEPARTUM: ICD-10-CM

## 2023-08-07 DIAGNOSIS — O09.93 HRP (HIGH RISK PREGNANCY), THIRD TRIMESTER: Primary | ICD-10-CM

## 2023-08-07 PROCEDURE — G8427 DOCREV CUR MEDS BY ELIG CLIN: HCPCS | Performed by: SPECIALIST

## 2023-08-07 PROCEDURE — G8417 CALC BMI ABV UP PARAM F/U: HCPCS | Performed by: SPECIALIST

## 2023-08-07 PROCEDURE — 59025 FETAL NON-STRESS TEST: CPT | Performed by: SPECIALIST

## 2023-08-07 PROCEDURE — 0502F SUBSEQUENT PRENATAL CARE: CPT | Performed by: SPECIALIST

## 2023-08-07 PROCEDURE — 1036F TOBACCO NON-USER: CPT | Performed by: SPECIALIST

## 2023-08-07 NOTE — PROGRESS NOTES
Grey Franz is a 21 y.o. female 35w2d        OB History    Para Term  AB Living   1 0 0 0 0 0   SAB IAB Ectopic Molar Multiple Live Births   0 0 0 0 0 0      # Outcome Date GA Lbr Zen/2nd Weight Sex Delivery Anes PTL Lv   1 Current                Chief Complaint   Patient presents with    High Risk Pregnancy    Hypertension    Non-stress Test        Blood pressure 98/60, pulse (!) 108, weight 251 lb 3.2 oz (113.9 kg), last menstrual period 2022, not currently breastfeeding. The patient was seen and evaluated. There was positive fetal movements. No contractions or leakage of fluid. Signs and symptoms of labor were reviewed. The S/S of Pre-Eclampsia were reviewed with the patient in detail. She is to report any of these if they occur. She currently denies any of these. The patient was instructed on fetal kick counts and was given a kick sheet to complete every 8 hours. She was instructed that the baby should move at a minimum of ten times within one hour after a meal. The patient was instructed to lay down on her left side twenty minutes after eating and count movements for up to one hour with a target value of ten movements. She was instructed to notify the office if she did not make that target after two attempts or if after any attempt there was less than four movements. The patient reports that the targets have been made Yes. Allergies: Allergies as of 2023 - Fully Reviewed 2023   Allergen Reaction Noted    Zofran [ondansetron]  2022       Group Beta Strep collection was completed. No: not due yet    GC and Chlamydia were previously preformed and reviewed. The results are negative. She has been instructed to call the office at anytime prior to going into the hospital so the on-call physician may direct her to the appropriate facility for care.  Exceptions were reviewed including but not limited to: Decreased fetal movement, vaginal Bleeding or

## 2023-08-10 ENCOUNTER — ROUTINE PRENATAL (OUTPATIENT)
Dept: PERINATAL CARE | Age: 23
End: 2023-08-10
Payer: COMMERCIAL

## 2023-08-10 VITALS
BODY MASS INDEX: 38.04 KG/M2 | SYSTOLIC BLOOD PRESSURE: 122 MMHG | TEMPERATURE: 97.5 F | DIASTOLIC BLOOD PRESSURE: 68 MMHG | RESPIRATION RATE: 16 BRPM | HEIGHT: 68 IN | HEART RATE: 104 BPM | WEIGHT: 251 LBS

## 2023-08-10 DIAGNOSIS — O16.3 HYPERTENSION AFFECTING PREGNANCY IN THIRD TRIMESTER: ICD-10-CM

## 2023-08-10 DIAGNOSIS — E03.9 HYPOTHYROIDISM AFFECTING PREGNANCY IN THIRD TRIMESTER: ICD-10-CM

## 2023-08-10 DIAGNOSIS — O35.BXX0 FETAL CARDIAC ECHOGENIC FOCUS, ANTEPARTUM, SINGLE OR UNSPECIFIED FETUS: ICD-10-CM

## 2023-08-10 DIAGNOSIS — O99.283 HYPOTHYROIDISM AFFECTING PREGNANCY IN THIRD TRIMESTER: ICD-10-CM

## 2023-08-10 DIAGNOSIS — O35.BXX0 FETAL VENTRICULAR SEPTAL DEFECT AFFECTING ANTEPARTUM CARE OF MOTHER, SINGLE OR UNSPECIFIED FETUS: ICD-10-CM

## 2023-08-10 DIAGNOSIS — Z3A.35 35 WEEKS GESTATION OF PREGNANCY: ICD-10-CM

## 2023-08-10 DIAGNOSIS — O35.9XX0 FETAL ABNORMALITY AFFECTING MANAGEMENT OF MOTHER, SINGLE OR UNSPECIFIED FETUS: ICD-10-CM

## 2023-08-10 DIAGNOSIS — O36.5930 INTRAUTERINE GROWTH RESTRICTION (IUGR) AFFECTING CARE OF MOTHER, THIRD TRIMESTER, SINGLE GESTATION: Primary | ICD-10-CM

## 2023-08-10 PROCEDURE — 76815 OB US LIMITED FETUS(S): CPT | Performed by: OBSTETRICS & GYNECOLOGY

## 2023-08-10 PROCEDURE — 76820 UMBILICAL ARTERY ECHO: CPT | Performed by: OBSTETRICS & GYNECOLOGY

## 2023-08-10 PROCEDURE — 76821 MIDDLE CEREBRAL ARTERY ECHO: CPT | Performed by: OBSTETRICS & GYNECOLOGY

## 2023-08-10 PROCEDURE — 99999 PR OFFICE/OUTPT VISIT,PROCEDURE ONLY: CPT | Performed by: OBSTETRICS & GYNECOLOGY

## 2023-08-10 PROCEDURE — 76819 FETAL BIOPHYS PROFIL W/O NST: CPT | Performed by: OBSTETRICS & GYNECOLOGY

## 2023-08-14 ENCOUNTER — HOSPITAL ENCOUNTER (OUTPATIENT)
Age: 23
Setting detail: SPECIMEN
Discharge: HOME | End: 2023-08-14

## 2023-08-14 ENCOUNTER — ROUTINE PRENATAL (OUTPATIENT)
Dept: OBGYN CLINIC | Age: 23
End: 2023-08-14
Payer: COMMERCIAL

## 2023-08-14 VITALS
WEIGHT: 252.2 LBS | DIASTOLIC BLOOD PRESSURE: 72 MMHG | HEART RATE: 104 BPM | BODY MASS INDEX: 38.35 KG/M2 | SYSTOLIC BLOOD PRESSURE: 110 MMHG

## 2023-08-14 DIAGNOSIS — O36.5939 IUGR (INTRAUTERINE GROWTH RESTRICTION) AFFECTING CARE OF MOTHER, THIRD TRIMESTER, OTHER FETUS: ICD-10-CM

## 2023-08-14 DIAGNOSIS — O09.93 HRP (HIGH RISK PREGNANCY), THIRD TRIMESTER: Primary | ICD-10-CM

## 2023-08-14 DIAGNOSIS — O16.9 HYPERTENSION AFFECTING PREGNANCY, ANTEPARTUM: ICD-10-CM

## 2023-08-14 DIAGNOSIS — E03.9 HYPOTHYROIDISM AFFECTING PREGNANCY, ANTEPARTUM: ICD-10-CM

## 2023-08-14 DIAGNOSIS — O99.280 HYPOTHYROIDISM AFFECTING PREGNANCY, ANTEPARTUM: ICD-10-CM

## 2023-08-14 DIAGNOSIS — Z3A.36 36 WEEKS GESTATION OF PREGNANCY: ICD-10-CM

## 2023-08-14 PROCEDURE — G8417 CALC BMI ABV UP PARAM F/U: HCPCS | Performed by: SPECIALIST

## 2023-08-14 PROCEDURE — G8427 DOCREV CUR MEDS BY ELIG CLIN: HCPCS | Performed by: SPECIALIST

## 2023-08-14 PROCEDURE — 59025 FETAL NON-STRESS TEST: CPT | Performed by: SPECIALIST

## 2023-08-14 PROCEDURE — 1036F TOBACCO NON-USER: CPT | Performed by: SPECIALIST

## 2023-08-14 PROCEDURE — 0502F SUBSEQUENT PRENATAL CARE: CPT | Performed by: SPECIALIST

## 2023-08-16 ENCOUNTER — ROUTINE PRENATAL (OUTPATIENT)
Dept: PERINATAL CARE | Age: 23
End: 2023-08-16
Payer: COMMERCIAL

## 2023-08-16 VITALS
HEART RATE: 100 BPM | RESPIRATION RATE: 16 BRPM | DIASTOLIC BLOOD PRESSURE: 73 MMHG | WEIGHT: 253 LBS | HEIGHT: 68 IN | BODY MASS INDEX: 38.34 KG/M2 | TEMPERATURE: 98.1 F | SYSTOLIC BLOOD PRESSURE: 123 MMHG

## 2023-08-16 DIAGNOSIS — O35.BXX0 FETAL VENTRICULAR SEPTAL DEFECT AFFECTING ANTEPARTUM CARE OF MOTHER, SINGLE OR UNSPECIFIED FETUS: ICD-10-CM

## 2023-08-16 DIAGNOSIS — O35.9XX0 FETAL ABNORMALITY AFFECTING MANAGEMENT OF MOTHER, SINGLE OR UNSPECIFIED FETUS: ICD-10-CM

## 2023-08-16 DIAGNOSIS — O36.5930 INTRAUTERINE GROWTH RESTRICTION (IUGR) AFFECTING CARE OF MOTHER, THIRD TRIMESTER, SINGLE GESTATION: Primary | ICD-10-CM

## 2023-08-16 DIAGNOSIS — E03.9 HYPOTHYROIDISM AFFECTING PREGNANCY IN THIRD TRIMESTER: ICD-10-CM

## 2023-08-16 DIAGNOSIS — Z3A.36 36 WEEKS GESTATION OF PREGNANCY: ICD-10-CM

## 2023-08-16 DIAGNOSIS — O99.283 HYPOTHYROIDISM AFFECTING PREGNANCY IN THIRD TRIMESTER: ICD-10-CM

## 2023-08-16 DIAGNOSIS — O16.3 HYPERTENSION AFFECTING PREGNANCY IN THIRD TRIMESTER: ICD-10-CM

## 2023-08-16 PROCEDURE — 76821 MIDDLE CEREBRAL ARTERY ECHO: CPT | Performed by: OBSTETRICS & GYNECOLOGY

## 2023-08-16 PROCEDURE — 76815 OB US LIMITED FETUS(S): CPT | Performed by: OBSTETRICS & GYNECOLOGY

## 2023-08-16 PROCEDURE — 76820 UMBILICAL ARTERY ECHO: CPT | Performed by: OBSTETRICS & GYNECOLOGY

## 2023-08-16 PROCEDURE — 76819 FETAL BIOPHYS PROFIL W/O NST: CPT | Performed by: OBSTETRICS & GYNECOLOGY

## 2023-08-16 PROCEDURE — 99999 PR OFFICE/OUTPT VISIT,PROCEDURE ONLY: CPT | Performed by: OBSTETRICS & GYNECOLOGY

## 2023-08-17 LAB
MICROORGANISM SPEC CULT: NORMAL
SPECIMEN DESCRIPTION: NORMAL

## 2023-08-21 ENCOUNTER — ROUTINE PRENATAL (OUTPATIENT)
Dept: OBGYN CLINIC | Age: 23
End: 2023-08-21
Payer: COMMERCIAL

## 2023-08-21 VITALS
DIASTOLIC BLOOD PRESSURE: 74 MMHG | BODY MASS INDEX: 38.29 KG/M2 | WEIGHT: 251.8 LBS | SYSTOLIC BLOOD PRESSURE: 124 MMHG | HEART RATE: 105 BPM

## 2023-08-21 DIAGNOSIS — O36.5939 IUGR (INTRAUTERINE GROWTH RESTRICTION) AFFECTING CARE OF MOTHER, THIRD TRIMESTER, OTHER FETUS: ICD-10-CM

## 2023-08-21 DIAGNOSIS — Z3A.37 37 WEEKS GESTATION OF PREGNANCY: ICD-10-CM

## 2023-08-21 DIAGNOSIS — O99.280 HYPOTHYROIDISM AFFECTING PREGNANCY, ANTEPARTUM: ICD-10-CM

## 2023-08-21 DIAGNOSIS — E03.9 HYPOTHYROIDISM AFFECTING PREGNANCY, ANTEPARTUM: ICD-10-CM

## 2023-08-21 DIAGNOSIS — O09.93 HRP (HIGH RISK PREGNANCY), THIRD TRIMESTER: Primary | ICD-10-CM

## 2023-08-21 DIAGNOSIS — O16.9 HYPERTENSION AFFECTING PREGNANCY, ANTEPARTUM: ICD-10-CM

## 2023-08-21 PROCEDURE — G8417 CALC BMI ABV UP PARAM F/U: HCPCS | Performed by: SPECIALIST

## 2023-08-21 PROCEDURE — 59025 FETAL NON-STRESS TEST: CPT | Performed by: SPECIALIST

## 2023-08-21 PROCEDURE — 1036F TOBACCO NON-USER: CPT | Performed by: SPECIALIST

## 2023-08-21 PROCEDURE — 0502F SUBSEQUENT PRENATAL CARE: CPT | Performed by: SPECIALIST

## 2023-08-21 PROCEDURE — G8427 DOCREV CUR MEDS BY ELIG CLIN: HCPCS | Performed by: SPECIALIST

## 2023-08-21 NOTE — PROGRESS NOTES
or hemorrhage, trauma, readily expectant delivery, or any instance where she feels 911 should be utilized. Patient Active Problem List    Diagnosis Date Noted    14 weeks gestation of pregnancy 03/16/2023     Priority: Medium    HRP (high risk pregnancy), second trimester 02/15/2023     Priority: Medium    Hypothyroidism affecting pregnancy, antepartum 02/15/2023     Priority: Medium    Hypertension affecting pregnancy, antepartum 07/17/2023    Poor fetal growth affecting management of mother, antepartum 07/17/2023    Suspected Fetal VSD 07/10/2023     Overview Note:     7/10/23: suspected fetal VSD x 2 on M US   Fetal echo to be scheduled     Patient has [facility] consultation scheduled       Irritable bowel syndrome with both constipation and diarrhea 08/14/2020    PCOS (polycystic ovarian syndrome) 04/03/2020    Amenorrhea 03/04/2020    Pelvic pain in female 03/04/2020        Diagnosis Orders   1. HRP (high risk pregnancy), third trimester  90120 - SD FETAL NON-STRESS TEST      2. 37 weeks gestation of pregnancy  53882 - SD FETAL NON-STRESS TEST      3. Hypothyroidism affecting pregnancy, antepartum  10373 - SD FETAL NON-STRESS TEST      4. IUGR (intrauterine growth restriction) affecting care of mother, third trimester, other fetus  0 - SD FETAL NON-STRESS TEST      5. Hypertension affecting pregnancy, antepartum  66476 - SD FETAL NON-STRESS TEST          Patient doing well. [facility] has diagnosed patient with chronic hypertension. Explained to patient that due to this diagnosis, her delivery should be scheduled around 45 gestational weeks. Patient will discuss this, and fetal growth, with [facility] on Thursday this week and the decision for when to schedule induction will be made on Monday. NST done today is reactive (see procedures tab for detail result). Fetal heart rate per NST baseline is 135 bpm and fundal height is 31 cm. today. Patient advised to continue taking prenatal vitamins and to rest as necessary.

## 2023-08-24 ENCOUNTER — ROUTINE PRENATAL (OUTPATIENT)
Dept: PERINATAL CARE | Age: 23
End: 2023-08-24
Payer: COMMERCIAL

## 2023-08-24 ENCOUNTER — HOSPITAL ENCOUNTER (INPATIENT)
Age: 23
LOS: 4 days | Discharge: HOME | End: 2023-08-28
Attending: SPECIALIST | Admitting: SPECIALIST
Payer: COMMERCIAL

## 2023-08-24 VITALS
HEART RATE: 91 BPM | SYSTOLIC BLOOD PRESSURE: 137 MMHG | TEMPERATURE: 97.9 F | BODY MASS INDEX: 38.56 KG/M2 | RESPIRATION RATE: 16 BRPM | HEIGHT: 68 IN | DIASTOLIC BLOOD PRESSURE: 67 MMHG | WEIGHT: 254.41 LBS

## 2023-08-24 DIAGNOSIS — Z3A.37 37 WEEKS GESTATION OF PREGNANCY: ICD-10-CM

## 2023-08-24 DIAGNOSIS — E03.9 HYPOTHYROIDISM AFFECTING PREGNANCY IN THIRD TRIMESTER: ICD-10-CM

## 2023-08-24 DIAGNOSIS — O35.9XX0 FETAL ABNORMALITY AFFECTING MANAGEMENT OF MOTHER, SINGLE OR UNSPECIFIED FETUS: ICD-10-CM

## 2023-08-24 DIAGNOSIS — O35.BXX0 FETAL VENTRICULAR SEPTAL DEFECT AFFECTING ANTEPARTUM CARE OF MOTHER, SINGLE OR UNSPECIFIED FETUS: ICD-10-CM

## 2023-08-24 DIAGNOSIS — Z36.4 ULTRASOUND FOR ANTENATAL SCREENING FOR FETAL GROWTH RESTRICTION: ICD-10-CM

## 2023-08-24 DIAGNOSIS — G89.18 POST-OP PAIN: Primary | ICD-10-CM

## 2023-08-24 DIAGNOSIS — O36.5930 INTRAUTERINE GROWTH RESTRICTION (IUGR) AFFECTING CARE OF MOTHER, THIRD TRIMESTER, SINGLE GESTATION: Primary | ICD-10-CM

## 2023-08-24 DIAGNOSIS — O99.283 HYPOTHYROIDISM AFFECTING PREGNANCY IN THIRD TRIMESTER: ICD-10-CM

## 2023-08-24 DIAGNOSIS — O16.3 HYPERTENSION AFFECTING PREGNANCY IN THIRD TRIMESTER: ICD-10-CM

## 2023-08-24 LAB
ABDOMINAL CIRCUMFERENCE: NORMAL
ABO + RH BLD: NORMAL
ALBUMIN SERPL-MCNC: 3.6 G/DL (ref 3.5–5.2)
ALBUMIN/GLOB SERPL: 1.2 {RATIO} (ref 1–2.5)
ALP SERPL-CCNC: 161 U/L (ref 35–104)
ALT SERPL-CCNC: 16 U/L (ref 5–33)
AMPHET UR QL SCN: NEGATIVE
ANION GAP SERPL CALCULATED.3IONS-SCNC: 12 MMOL/L (ref 9–17)
ARM BAND NUMBER: NORMAL
AST SERPL-CCNC: 16 U/L
BARBITURATES UR QL SCN: NEGATIVE
BASOPHILS # BLD: 0.06 K/UL (ref 0–0.2)
BASOPHILS NFR BLD: 0 % (ref 0–2)
BENZODIAZ UR QL: NEGATIVE
BILIRUB SERPL-MCNC: 0.3 MG/DL (ref 0.3–1.2)
BIPARIETAL DIAMETER: NORMAL
BLOOD BANK SAMPLE EXPIRATION: NORMAL
BLOOD GROUP ANTIBODIES SERPL: NEGATIVE
BUN SERPL-MCNC: 4 MG/DL (ref 6–20)
CALCIUM SERPL-MCNC: 8.7 MG/DL (ref 8.6–10.4)
CANNABINOIDS UR QL SCN: POSITIVE
CHLORIDE SERPL-SCNC: 105 MMOL/L (ref 98–107)
CO2 SERPL-SCNC: 19 MMOL/L (ref 20–31)
COCAINE UR QL SCN: NEGATIVE
CREAT SERPL-MCNC: 0.3 MG/DL (ref 0.5–0.9)
CREAT UR-MCNC: 142.7 MG/DL (ref 28–217)
EOSINOPHIL # BLD: 0.35 K/UL (ref 0–0.44)
EOSINOPHILS RELATIVE PERCENT: 2 % (ref 1–4)
ERYTHROCYTE [DISTWIDTH] IN BLOOD BY AUTOMATED COUNT: 12.5 % (ref 11.8–14.4)
ESTIMATED FETAL WEIGHT: NORMAL
FEMORAL DIAMETER: NORMAL
FENTANYL UR QL: NEGATIVE
GFR SERPL CREATININE-BSD FRML MDRD: >60 ML/MIN/1.73M2
GLUCOSE SERPL-MCNC: 83 MG/DL (ref 70–99)
HC/AC: NORMAL
HCT VFR BLD AUTO: 39.2 % (ref 36.3–47.1)
HEAD CIRCUMFERENCE: NORMAL
HGB BLD-MCNC: 13.4 G/DL (ref 11.9–15.1)
IMM GRANULOCYTES # BLD AUTO: 0.09 K/UL (ref 0–0.3)
IMM GRANULOCYTES NFR BLD: 1 %
LYMPHOCYTES NFR BLD: 2.56 K/UL (ref 1.1–3.7)
LYMPHOCYTES RELATIVE PERCENT: 15 % (ref 24–43)
MCH RBC QN AUTO: 31.5 PG (ref 25.2–33.5)
MCHC RBC AUTO-ENTMCNC: 34.2 G/DL (ref 28.4–34.8)
MCV RBC AUTO: 92 FL (ref 82.6–102.9)
METHADONE UR QL: NEGATIVE
MONOCYTES NFR BLD: 1.07 K/UL (ref 0.1–1.2)
MONOCYTES NFR BLD: 6 % (ref 3–12)
NEUTROPHILS NFR BLD: 76 % (ref 36–65)
NEUTS SEG NFR BLD: 12.62 K/UL (ref 1.5–8.1)
NRBC BLD-RTO: 0 PER 100 WBC
OPIATES UR QL SCN: NEGATIVE
OXYCODONE UR QL SCN: NEGATIVE
PCP UR QL SCN: NEGATIVE
PLATELET # BLD AUTO: 295 K/UL (ref 138–453)
PMV BLD AUTO: 10.8 FL (ref 8.1–13.5)
POTASSIUM SERPL-SCNC: 3.8 MMOL/L (ref 3.7–5.3)
PROT SERPL-MCNC: 6.5 G/DL (ref 6.4–8.3)
RBC # BLD AUTO: 4.26 M/UL (ref 3.95–5.11)
SODIUM SERPL-SCNC: 136 MMOL/L (ref 135–144)
T PALLIDUM AB SER QL IA: NONREACTIVE
TEST INFORMATION: ABNORMAL
TOTAL PROTEIN, URINE: 14 MG/DL
URINE TOTAL PROTEIN CREATININE RATIO: 0.1 (ref 0–0.2)
WBC OTHER # BLD: 16.8 K/UL (ref 3.5–11.3)

## 2023-08-24 PROCEDURE — 84156 ASSAY OF PROTEIN URINE: CPT

## 2023-08-24 PROCEDURE — 86850 RBC ANTIBODY SCREEN: CPT

## 2023-08-24 PROCEDURE — 86901 BLOOD TYPING SEROLOGIC RH(D): CPT

## 2023-08-24 PROCEDURE — 86900 BLOOD TYPING SEROLOGIC ABO: CPT

## 2023-08-24 PROCEDURE — 86780 TREPONEMA PALLIDUM: CPT

## 2023-08-24 PROCEDURE — 2580000003 HC RX 258

## 2023-08-24 PROCEDURE — 6360000002 HC RX W HCPCS

## 2023-08-24 PROCEDURE — 6370000000 HC RX 637 (ALT 250 FOR IP): Performed by: STUDENT IN AN ORGANIZED HEALTH CARE EDUCATION/TRAINING PROGRAM

## 2023-08-24 PROCEDURE — 80307 DRUG TEST PRSMV CHEM ANLYZR: CPT

## 2023-08-24 PROCEDURE — 82570 ASSAY OF URINE CREATININE: CPT

## 2023-08-24 PROCEDURE — 1220000000 HC SEMI PRIVATE OB R&B

## 2023-08-24 PROCEDURE — 76816 OB US FOLLOW-UP PER FETUS: CPT | Performed by: OBSTETRICS & GYNECOLOGY

## 2023-08-24 PROCEDURE — 76821 MIDDLE CEREBRAL ARTERY ECHO: CPT | Performed by: OBSTETRICS & GYNECOLOGY

## 2023-08-24 PROCEDURE — 85025 COMPLETE CBC W/AUTO DIFF WBC: CPT

## 2023-08-24 PROCEDURE — 76819 FETAL BIOPHYS PROFIL W/O NST: CPT | Performed by: OBSTETRICS & GYNECOLOGY

## 2023-08-24 PROCEDURE — 36415 COLL VENOUS BLD VENIPUNCTURE: CPT

## 2023-08-24 PROCEDURE — 76820 UMBILICAL ARTERY ECHO: CPT | Performed by: OBSTETRICS & GYNECOLOGY

## 2023-08-24 PROCEDURE — 80053 COMPREHEN METABOLIC PANEL: CPT

## 2023-08-24 PROCEDURE — 6360000002 HC RX W HCPCS: Performed by: STUDENT IN AN ORGANIZED HEALTH CARE EDUCATION/TRAINING PROGRAM

## 2023-08-24 RX ORDER — PROCHLORPERAZINE EDISYLATE 5 MG/ML
10 INJECTION INTRAMUSCULAR; INTRAVENOUS ONCE
Status: COMPLETED | OUTPATIENT
Start: 2023-08-24 | End: 2023-08-24

## 2023-08-24 RX ORDER — SODIUM CHLORIDE, SODIUM LACTATE, POTASSIUM CHLORIDE, AND CALCIUM CHLORIDE .6; .31; .03; .02 G/100ML; G/100ML; G/100ML; G/100ML
1000 INJECTION, SOLUTION INTRAVENOUS PRN
Status: DISCONTINUED | OUTPATIENT
Start: 2023-08-24 | End: 2023-08-26 | Stop reason: HOSPADM

## 2023-08-24 RX ORDER — SODIUM CHLORIDE, SODIUM LACTATE, POTASSIUM CHLORIDE, AND CALCIUM CHLORIDE .6; .31; .03; .02 G/100ML; G/100ML; G/100ML; G/100ML
500 INJECTION, SOLUTION INTRAVENOUS PRN
Status: DISCONTINUED | OUTPATIENT
Start: 2023-08-24 | End: 2023-08-26 | Stop reason: HOSPADM

## 2023-08-24 RX ORDER — SODIUM CHLORIDE, SODIUM LACTATE, POTASSIUM CHLORIDE, CALCIUM CHLORIDE 600; 310; 30; 20 MG/100ML; MG/100ML; MG/100ML; MG/100ML
INJECTION, SOLUTION INTRAVENOUS CONTINUOUS
Status: DISCONTINUED | OUTPATIENT
Start: 2023-08-24 | End: 2023-08-26

## 2023-08-24 RX ORDER — NALBUPHINE HYDROCHLORIDE 20 MG/ML
10 INJECTION, SOLUTION INTRAMUSCULAR; INTRAVENOUS; SUBCUTANEOUS ONCE
Status: COMPLETED | OUTPATIENT
Start: 2023-08-24 | End: 2023-08-24

## 2023-08-24 RX ORDER — ALBUTEROL SULFATE 90 UG/1
2 AEROSOL, METERED RESPIRATORY (INHALATION) EVERY 4 HOURS PRN
Status: DISCONTINUED | OUTPATIENT
Start: 2023-08-24 | End: 2023-08-26

## 2023-08-24 RX ORDER — MORPHINE SULFATE 10 MG/ML
8 INJECTION, SOLUTION INTRAMUSCULAR; INTRAVENOUS ONCE
Status: COMPLETED | OUTPATIENT
Start: 2023-08-24 | End: 2023-08-24

## 2023-08-24 RX ORDER — MORPHINE SULFATE 2 MG/ML
2 INJECTION, SOLUTION INTRAMUSCULAR; INTRAVENOUS ONCE
Status: COMPLETED | OUTPATIENT
Start: 2023-08-24 | End: 2023-08-24

## 2023-08-24 RX ORDER — DIPHENHYDRAMINE HCL 25 MG
25 TABLET ORAL EVERY 4 HOURS PRN
Status: DISCONTINUED | OUTPATIENT
Start: 2023-08-24 | End: 2023-08-26 | Stop reason: HOSPADM

## 2023-08-24 RX ORDER — ACETAMINOPHEN 500 MG
1000 TABLET ORAL EVERY 6 HOURS PRN
Status: DISCONTINUED | OUTPATIENT
Start: 2023-08-24 | End: 2023-08-26 | Stop reason: HOSPADM

## 2023-08-24 RX ORDER — ONDANSETRON 2 MG/ML
4 INJECTION INTRAMUSCULAR; INTRAVENOUS EVERY 6 HOURS PRN
Status: DISCONTINUED | OUTPATIENT
Start: 2023-08-24 | End: 2023-08-26 | Stop reason: HOSPADM

## 2023-08-24 RX ORDER — SODIUM CHLORIDE 9 MG/ML
INJECTION, SOLUTION INTRAVENOUS PRN
Status: DISCONTINUED | OUTPATIENT
Start: 2023-08-24 | End: 2023-08-26 | Stop reason: HOSPADM

## 2023-08-24 RX ORDER — SODIUM CHLORIDE 0.9 % (FLUSH) 0.9 %
5-40 SYRINGE (ML) INJECTION PRN
Status: DISCONTINUED | OUTPATIENT
Start: 2023-08-24 | End: 2023-08-26 | Stop reason: HOSPADM

## 2023-08-24 RX ORDER — SODIUM CHLORIDE 0.9 % (FLUSH) 0.9 %
5-40 SYRINGE (ML) INJECTION EVERY 12 HOURS SCHEDULED
Status: DISCONTINUED | OUTPATIENT
Start: 2023-08-24 | End: 2023-08-26 | Stop reason: HOSPADM

## 2023-08-24 RX ADMIN — ONDANSETRON 4 MG: 2 INJECTION INTRAMUSCULAR; INTRAVENOUS at 19:03

## 2023-08-24 RX ADMIN — PROCHLORPERAZINE EDISYLATE 10 MG: 5 INJECTION INTRAMUSCULAR; INTRAVENOUS at 14:33

## 2023-08-24 RX ADMIN — Medication 25 MCG: at 12:44

## 2023-08-24 RX ADMIN — SODIUM CHLORIDE, POTASSIUM CHLORIDE, SODIUM LACTATE AND CALCIUM CHLORIDE: 600; 310; 30; 20 INJECTION, SOLUTION INTRAVENOUS at 12:13

## 2023-08-24 RX ADMIN — MORPHINE SULFATE 2 MG: 2 INJECTION, SOLUTION INTRAMUSCULAR; INTRAVENOUS at 14:45

## 2023-08-24 RX ADMIN — SODIUM CHLORIDE, POTASSIUM CHLORIDE, SODIUM LACTATE AND CALCIUM CHLORIDE: 600; 310; 30; 20 INJECTION, SOLUTION INTRAVENOUS at 12:11

## 2023-08-24 RX ADMIN — MORPHINE SULFATE 8 MG: 10 INJECTION INTRAVENOUS at 14:34

## 2023-08-24 RX ADMIN — Medication 25 MCG: at 18:31

## 2023-08-24 RX ADMIN — Medication 10 MG: at 23:11

## 2023-08-24 RX ADMIN — Medication 25 MCG: at 22:30

## 2023-08-24 RX ADMIN — SODIUM CHLORIDE, POTASSIUM CHLORIDE, SODIUM LACTATE AND CALCIUM CHLORIDE: 600; 310; 30; 20 INJECTION, SOLUTION INTRAVENOUS at 20:12

## 2023-08-24 ASSESSMENT — PAIN DESCRIPTION - LOCATION: LOCATION: ABDOMEN

## 2023-08-24 ASSESSMENT — PAIN DESCRIPTION - DESCRIPTORS: DESCRIPTORS: CRAMPING

## 2023-08-24 ASSESSMENT — PAIN SCALES - GENERAL
PAINLEVEL_OUTOF10: 7
PAINLEVEL_OUTOF10: 3

## 2023-08-24 ASSESSMENT — PAIN DESCRIPTION - ORIENTATION: ORIENTATION: LOWER;MID

## 2023-08-24 NOTE — CARE COORDINATION
ANTEPARTUM NOTE    37 weeks gestation of pregnancy [Z3A.37]    Jennifer Valencia  was admitted to L&D on 8/24/23 for IOL           @ 37W 5D    OB GYN Provider: Dr. Star Burnham    Will meet with patient after delivery to verify name/address/phone/insurance and discuss discharge planning. Anticipate DC home 2 nights after vaginal delivery or 4 nights after C/S delivery as long as hemodynamically stable.

## 2023-08-24 NOTE — DISCHARGE SUMMARY
fluid prior to surgery: No  Received pre-operative Tylenol and Pepcid: Yes  Received Scopolamine patch: Yes  Received post-operative scheduled Motrin and Tylenol: Yes  Nichols catheter discontinued 12 hours post-operatively: Yes        ERAS requirements met (3/6): Yes    Postpartum course: normal.    POD#1: Hgb 10.9    Course of patient: uncomplicated    Discharge to: Home    Readmission planned: no     Indication for 6 week PP 2 hour GTT?: no     Recommendations on Discharge:     Medications:      Medication List        START taking these medications      ibuprofen 600 MG tablet  Commonly known as: ADVIL;MOTRIN  Take 1 tablet by mouth every 6 hours as needed for Pain     oxyCODONE 5 MG immediate release tablet  Commonly known as: Roxicodone  Take 1 tablet by mouth every 6 hours as needed for Pain for up to 5 days. Intended supply: 3 days. Take lowest dose possible to manage pain Max Daily Amount: 20 mg     sennosides-docusate sodium 8.6-50 MG tablet  Commonly known as: SENOKOT-S  Take 1 tablet by mouth daily     simethicone 80 MG chewable tablet  Commonly known as: MYLICON  Take 1 tablet by mouth 4 times daily as needed for Flatulence (gas pain)            CONTINUE taking these medications      doxyLAMINE succinate 25 MG tablet  Commonly known as: Unisom SleepTabs  Take 1 tablet by mouth nightly With unisom for nausea     metoclopramide 10 MG tablet  Commonly known as: REGLAN  TAKE 1 TABLET BY MOUTH THREE TIMES DAILY AS NEEDED FOR NAUSEA OR  VOMITING     PNV Prenatal Plus Multivitamin 27-1 MG Tabs  Take 1 tablet by mouth daily     pyridoxine 50 MG tablet  Commonly known as: RA Vitamin B-6  Take 1/2 tablet at bedtime with unisom for nausea.      ZYRTEC PO            STOP taking these medications      aspirin EC 81 MG EC tablet            ASK your doctor about these medications      albuterol sulfate  (90 Base) MCG/ACT inhaler  Commonly known as: ProAir HFA  Inhale 2 puffs into the lungs every 4 hours as needed

## 2023-08-24 NOTE — PROGRESS NOTES
I would advise delivery today at 40 5/7 weeks' gestation for fetal growth restriction (with the concurrent condition of chronic hypertension and given the maternal/fetal medical/obstetrical complications of pregnancy), as per [employer] of Obstetricians and Gynecologists and the Society for Maternal-Fetal Medicine guidelines in the ACOG Committee Opinion Number [phone]  (\"Medically Indicated Late- and Early-Term Deliveries\", Obstetrics & Gynecology, Volume 138, NO.1, 2021. Pediatric cardiology antepartum consultation/fetal echo, etc. declined by patient. Therefore, advise   echocardiogram and pediatric cardiology consultation (given the fetal cardiac findings previously observed). Please refer to [address] [facility] resident progress note in EPIC.

## 2023-08-24 NOTE — H&P
OBSTETRICAL HISTORY [address]    Date: 2023       Time: 12:21 PM   Patient Name: Ara Garnica     Patient : 2000  Room/Bed: 7976/3552-15    Admission Date/Time: 2023 11:34 AM      CC: MFM Rec Delivery 2/2 cHTN (no meds) and FGR (AC<10%tile)     HPI: Ara Garnica is a 21 y.o. Goyo Lame at 37w5d who presents for an MFM Rec Delivery 2/2 cHTN (no meds) and FGR (AC<10%tile). Patient denies any fever, chills, N/V, headaches, vision changes, chest pain, shortness of breath, RUQ pain, abdominal pain, and increased swelling/tenderness in bilateral lower extremities. Patient denies any vaginal discharge and any urinary complaints. The patient reports fetal movement is present, denies contractions, denies loss of fluid, denies vaginal bleeding. DATING:  LMP: Patient's last menstrual period was 2022 (approximate).   Estimated Date of Delivery: 23   Based on: early ultrasound, at 7 4/7 weeks GA    PREGNANCY RISK FACTORS:  Patient Active Problem List   Diagnosis    Amenorrhea    Pelvic pain in female    PCOS (polycystic ovarian syndrome)    Irritable bowel syndrome with both constipation and diarrhea    HRP (high risk pregnancy), second trimester    Hypothyroidism affecting pregnancy, antepartum    14 weeks gestation of pregnancy    Suspected Fetal VSD    Hypertension affecting pregnancy, antepartum    Poor fetal growth affecting management of mother, antepartum    37 weeks gestation of pregnancy        Steroids Given In This Pregnancy:  no     REVIEW OF SYSTEMS:   Constitutional: negative fever, negative chills, negative weight changes   HEENT: negative visual disturbances, negative headaches, negative dizziness, negative hearing loss  Breast: Negative breast abnormalities, negative breast lumps, negative nipple discharge  Respiratory: negative dyspnea, negative cough, negative SOB  Cardiovascular: negative chest pain,  negative palpitations, negative

## 2023-08-24 NOTE — PROGRESS NOTES
Obstetric/Gynecology Maternal Fetal Medicine Resident Note    Patient is a 20 yo  @ 37w5d who presented for her routine MFM scan. Patient has cHTN and FGR with AC<10%tile. Discussed with patient the ACOG recommendations for indicated deliveries. At this time, patient falls within window for FGR and cHTN. Patient amendable for admission to L&D for IOL per recommendation by ACOG and her primary OBGYN . Dr. Ravi Sena updated. L&D RN and OBGYN residents updated.       Eleazar Leonard MD  OBGYN Resident, PGY2  [facility]  2023, 11:08 AM

## 2023-08-25 ENCOUNTER — ANESTHESIA (OUTPATIENT)
Dept: LABOR AND DELIVERY | Age: 23
End: 2023-08-25
Payer: COMMERCIAL

## 2023-08-25 ENCOUNTER — ANESTHESIA EVENT (OUTPATIENT)
Dept: LABOR AND DELIVERY | Age: 23
End: 2023-08-25
Payer: COMMERCIAL

## 2023-08-25 PROCEDURE — 2580000003 HC RX 258: Performed by: STUDENT IN AN ORGANIZED HEALTH CARE EDUCATION/TRAINING PROGRAM

## 2023-08-25 PROCEDURE — 3E033VJ INTRODUCTION OF OTHER HORMONE INTO PERIPHERAL VEIN, PERCUTANEOUS APPROACH: ICD-10-PCS | Performed by: OBSTETRICS & GYNECOLOGY

## 2023-08-25 PROCEDURE — 6360000002 HC RX W HCPCS: Performed by: ANESTHESIOLOGY

## 2023-08-25 PROCEDURE — 3700000025 EPIDURAL BLOCK: Performed by: ANESTHESIOLOGY

## 2023-08-25 PROCEDURE — 51701 INSERT BLADDER CATHETER: CPT

## 2023-08-25 PROCEDURE — 1220000000 HC SEMI PRIVATE OB R&B

## 2023-08-25 PROCEDURE — 2500000003 HC RX 250 WO HCPCS: Performed by: NURSE ANESTHETIST, CERTIFIED REGISTERED

## 2023-08-25 PROCEDURE — 2580000003 HC RX 258

## 2023-08-25 PROCEDURE — 10907ZC DRAINAGE OF AMNIOTIC FLUID, THERAPEUTIC FROM PRODUCTS OF CONCEPTION, VIA NATURAL OR ARTIFICIAL OPENING: ICD-10-PCS | Performed by: OBSTETRICS & GYNECOLOGY

## 2023-08-25 PROCEDURE — 10H07YZ INSERTION OF OTHER DEVICE INTO PRODUCTS OF CONCEPTION, VIA NATURAL OR ARTIFICIAL OPENING: ICD-10-PCS | Performed by: OBSTETRICS & GYNECOLOGY

## 2023-08-25 PROCEDURE — 6360000002 HC RX W HCPCS

## 2023-08-25 PROCEDURE — 2500000003 HC RX 250 WO HCPCS: Performed by: STUDENT IN AN ORGANIZED HEALTH CARE EDUCATION/TRAINING PROGRAM

## 2023-08-25 RX ORDER — LIDOCAINE HYDROCHLORIDE AND EPINEPHRINE 15; 5 MG/ML; UG/ML
INJECTION, SOLUTION EPIDURAL PRN
Status: DISCONTINUED | OUTPATIENT
Start: 2023-08-25 | End: 2023-08-26 | Stop reason: SDUPTHER

## 2023-08-25 RX ORDER — ONDANSETRON 2 MG/ML
4 INJECTION INTRAMUSCULAR; INTRAVENOUS EVERY 6 HOURS PRN
Status: DISCONTINUED | OUTPATIENT
Start: 2023-08-25 | End: 2023-08-26 | Stop reason: HOSPADM

## 2023-08-25 RX ORDER — NALOXONE HYDROCHLORIDE 0.4 MG/ML
INJECTION, SOLUTION INTRAMUSCULAR; INTRAVENOUS; SUBCUTANEOUS PRN
Status: DISCONTINUED | OUTPATIENT
Start: 2023-08-25 | End: 2023-08-26 | Stop reason: HOSPADM

## 2023-08-25 RX ADMIN — SODIUM CHLORIDE, PRESERVATIVE FREE 20 MG: 5 INJECTION INTRAVENOUS at 19:32

## 2023-08-25 RX ADMIN — LIDOCAINE HYDROCHLORIDE,EPINEPHRINE BITARTRATE 3 ML: 15; .005 INJECTION, SOLUTION EPIDURAL; INFILTRATION; INTRACAUDAL; PERINEURAL at 14:10

## 2023-08-25 RX ADMIN — SODIUM CHLORIDE, POTASSIUM CHLORIDE, SODIUM LACTATE AND CALCIUM CHLORIDE: 600; 310; 30; 20 INJECTION, SOLUTION INTRAVENOUS at 15:40

## 2023-08-25 RX ADMIN — LIDOCAINE HYDROCHLORIDE,EPINEPHRINE BITARTRATE 2 ML: 15; .005 INJECTION, SOLUTION EPIDURAL; INFILTRATION; INTRACAUDAL; PERINEURAL at 14:13

## 2023-08-25 RX ADMIN — ROPIVACAINE HYDROCHLORIDE 12 ML/HR: 2 INJECTION, SOLUTION EPIDURAL; INFILTRATION at 14:19

## 2023-08-25 RX ADMIN — SODIUM CHLORIDE, POTASSIUM CHLORIDE, SODIUM LACTATE AND CALCIUM CHLORIDE 125 ML/HR: 600; 310; 30; 20 INJECTION, SOLUTION INTRAVENOUS at 04:38

## 2023-08-25 RX ADMIN — SODIUM CHLORIDE, POTASSIUM CHLORIDE, SODIUM LACTATE AND CALCIUM CHLORIDE: 600; 310; 30; 20 INJECTION, SOLUTION INTRAVENOUS at 19:37

## 2023-08-25 RX ADMIN — Medication 1 MILLI-UNITS/MIN: at 03:07

## 2023-08-25 RX ADMIN — ROPIVACAINE HYDROCHLORIDE 12 ML/HR: 2 INJECTION, SOLUTION EPIDURAL; INFILTRATION at 21:18

## 2023-08-25 RX ADMIN — ONDANSETRON 4 MG: 2 INJECTION INTRAMUSCULAR; INTRAVENOUS at 21:26

## 2023-08-25 RX ADMIN — SODIUM CHLORIDE, POTASSIUM CHLORIDE, SODIUM LACTATE AND CALCIUM CHLORIDE 500 ML: 600; 310; 30; 20 INJECTION, SOLUTION INTRAVENOUS at 17:12

## 2023-08-25 RX ADMIN — ROPIVACAINE HYDROCHLORIDE 7 ML: 2 INJECTION, SOLUTION EPIDURAL; INFILTRATION at 14:18

## 2023-08-25 RX ADMIN — ONDANSETRON 4 MG: 2 INJECTION INTRAMUSCULAR; INTRAVENOUS at 04:34

## 2023-08-25 ASSESSMENT — PAIN DESCRIPTION - DESCRIPTORS
DESCRIPTORS: CRAMPING
DESCRIPTORS: CRAMPING

## 2023-08-25 ASSESSMENT — PAIN SCALES - GENERAL
PAINLEVEL_OUTOF10: 0
PAINLEVEL_OUTOF10: 2

## 2023-08-25 NOTE — ANESTHESIA PROCEDURE NOTES
Epidural Block    Patient location during procedure: OB  Reason for block: labor epidural  Staffing  Performed: resident/CRNA   Resident/CRNA: HILDA Dickerson - CRNA  Epidural  Patient position: sitting  Prep: Betasept and site prepped and draped  Patient monitoring: continuous pulse ox and frequent blood pressure checks  Approach: midline  Location: L3-4  Injection technique: JERED saline and JERED air  Provider prep: mask and sterile gloves  Needle  Needle type: Tuohy   Needle gauge: 17 G  Needle length: 3.5 in  Needle insertion depth: 8.5 cm  Catheter type: side hole  Catheter size: 19 G  Catheter at skin depth: 18 cm  Test dose: negativeCatheter Secured: tegaderm and tape  Assessment  Hemodynamics: stable  Attempts: 2  Outcomes: uncomplicated and patient tolerated procedure well  Preanesthetic Checklist  Completed: patient identified, IV checked, site marked, risks and benefits discussed, surgical/procedural consents, equipment checked, pre-op evaluation, timeout performed, anesthesia consent given, oxygen available, monitors applied/VS acknowledged, fire risk safety assessment completed and verbalized and blood product R/B/A discussed and consented

## 2023-08-25 NOTE — FLOWSHEET NOTE
Patient requesting a shower, states she came directly from [facility] yesterday and would like shower prior to epidural, AROM. EFM Cat 1 tracing. Pitocin rate currently at 3. EF changed to wireless monitor. Dr. Jagdish Nicholson notified, OK to IV/ pitocin to be off for quick shower. 0820 Pitocin restarted at 1 after shower.

## 2023-08-25 NOTE — FLOWSHEET NOTE
93042 Lan linnea, at bedside. Epidural procedure explained, risks discussed. Pt verbalizes consent for epidural.   1340 patient positioned for epidural Time out completed. 1347, 1355, 1402 Local given  1408 catheter placed. 1410 test dose given. Epidural catheter taped and secured per anesthesia. 1415 to low fowlers with left uterine displacement. 1417 pump initiated. Pt tolerated procedure well.

## 2023-08-26 PROBLEM — O61.0 FAILED MEDICAL INDUCTION OF LABOR: Status: ACTIVE | Noted: 2023-08-26

## 2023-08-26 PROBLEM — Z41.9 PATIENT-REQUESTED PROCEDURE: Status: ACTIVE | Noted: 2023-08-26

## 2023-08-26 PROCEDURE — 2580000003 HC RX 258

## 2023-08-26 PROCEDURE — 7100000001 HC PACU RECOVERY - ADDTL 15 MIN: Performed by: OBSTETRICS & GYNECOLOGY

## 2023-08-26 PROCEDURE — A4216 STERILE WATER/SALINE, 10 ML: HCPCS

## 2023-08-26 PROCEDURE — 7100000000 HC PACU RECOVERY - FIRST 15 MIN: Performed by: OBSTETRICS & GYNECOLOGY

## 2023-08-26 PROCEDURE — 3609079900 HC CESAREAN SECTION: Performed by: OBSTETRICS & GYNECOLOGY

## 2023-08-26 PROCEDURE — 59510 CESAREAN DELIVERY: CPT | Performed by: OBSTETRICS & GYNECOLOGY

## 2023-08-26 PROCEDURE — 6360000002 HC RX W HCPCS

## 2023-08-26 PROCEDURE — 3700000000 HC ANESTHESIA ATTENDED CARE: Performed by: OBSTETRICS & GYNECOLOGY

## 2023-08-26 PROCEDURE — 6360000002 HC RX W HCPCS: Performed by: ANESTHESIOLOGY

## 2023-08-26 PROCEDURE — 88307 TISSUE EXAM BY PATHOLOGIST: CPT

## 2023-08-26 PROCEDURE — 2709999900 HC NON-CHARGEABLE SUPPLY: Performed by: OBSTETRICS & GYNECOLOGY

## 2023-08-26 PROCEDURE — 3700000001 HC ADD 15 MINUTES (ANESTHESIA): Performed by: OBSTETRICS & GYNECOLOGY

## 2023-08-26 PROCEDURE — 6360000002 HC RX W HCPCS: Performed by: NURSE ANESTHETIST, CERTIFIED REGISTERED

## 2023-08-26 PROCEDURE — 2500000003 HC RX 250 WO HCPCS

## 2023-08-26 PROCEDURE — 6370000000 HC RX 637 (ALT 250 FOR IP)

## 2023-08-26 PROCEDURE — 1220000000 HC SEMI PRIVATE OB R&B

## 2023-08-26 PROCEDURE — 2500000003 HC RX 250 WO HCPCS: Performed by: NURSE ANESTHETIST, CERTIFIED REGISTERED

## 2023-08-26 RX ORDER — LIDOCAINE HYDROCHLORIDE 20 MG/ML
INJECTION, SOLUTION EPIDURAL; INFILTRATION; INTRACAUDAL; PERINEURAL PRN
Status: DISCONTINUED | OUTPATIENT
Start: 2023-08-26 | End: 2023-08-26 | Stop reason: SDUPTHER

## 2023-08-26 RX ORDER — OXYCODONE HYDROCHLORIDE 5 MG/1
5 TABLET ORAL EVERY 6 HOURS PRN
Qty: 20 TABLET | Refills: 0 | Status: SHIPPED | OUTPATIENT
Start: 2023-08-26 | End: 2023-08-31

## 2023-08-26 RX ORDER — MORPHINE SULFATE 4 MG/ML
INJECTION, SOLUTION INTRAMUSCULAR; INTRAVENOUS PRN
Status: DISCONTINUED | OUTPATIENT
Start: 2023-08-26 | End: 2023-08-26 | Stop reason: SDUPTHER

## 2023-08-26 RX ORDER — KETOROLAC TROMETHAMINE 30 MG/ML
INJECTION, SOLUTION INTRAMUSCULAR; INTRAVENOUS PRN
Status: DISCONTINUED | OUTPATIENT
Start: 2023-08-26 | End: 2023-08-26 | Stop reason: SDUPTHER

## 2023-08-26 RX ORDER — SODIUM CHLORIDE 0.9 % (FLUSH) 0.9 %
5-40 SYRINGE (ML) INJECTION PRN
Status: DISCONTINUED | OUTPATIENT
Start: 2023-08-26 | End: 2023-08-28 | Stop reason: HOSPADM

## 2023-08-26 RX ORDER — ACETAMINOPHEN 500 MG
1000 TABLET ORAL ONCE
Status: COMPLETED | OUTPATIENT
Start: 2023-08-26 | End: 2023-08-26

## 2023-08-26 RX ORDER — SIMETHICONE 80 MG
80 TABLET,CHEWABLE ORAL 4 TIMES DAILY PRN
Qty: 60 TABLET | Refills: 1 | Status: SHIPPED | OUTPATIENT
Start: 2023-08-26

## 2023-08-26 RX ORDER — DEXAMETHASONE SODIUM PHOSPHATE 10 MG/ML
INJECTION, SOLUTION INTRAMUSCULAR; INTRAVENOUS PRN
Status: DISCONTINUED | OUTPATIENT
Start: 2023-08-26 | End: 2023-08-26 | Stop reason: SDUPTHER

## 2023-08-26 RX ORDER — SCOLOPAMINE TRANSDERMAL SYSTEM 1 MG/1
1 PATCH, EXTENDED RELEASE TRANSDERMAL ONCE
Status: DISCONTINUED | OUTPATIENT
Start: 2023-08-26 | End: 2023-08-28 | Stop reason: HOSPADM

## 2023-08-26 RX ORDER — SODIUM CHLORIDE 9 MG/ML
INJECTION, SOLUTION INTRAVENOUS PRN
Status: DISCONTINUED | OUTPATIENT
Start: 2023-08-26 | End: 2023-08-28 | Stop reason: HOSPADM

## 2023-08-26 RX ORDER — IBUPROFEN 600 MG/1
600 TABLET ORAL EVERY 6 HOURS PRN
Qty: 40 TABLET | Refills: 0 | Status: SHIPPED | OUTPATIENT
Start: 2023-08-26

## 2023-08-26 RX ORDER — SIMETHICONE 80 MG
80 TABLET,CHEWABLE ORAL EVERY 6 HOURS PRN
Status: DISCONTINUED | OUTPATIENT
Start: 2023-08-26 | End: 2023-08-28 | Stop reason: HOSPADM

## 2023-08-26 RX ORDER — NALOXONE HYDROCHLORIDE 0.4 MG/ML
0.4 INJECTION, SOLUTION INTRAMUSCULAR; INTRAVENOUS; SUBCUTANEOUS PRN
Status: DISCONTINUED | OUTPATIENT
Start: 2023-08-26 | End: 2023-08-28 | Stop reason: HOSPADM

## 2023-08-26 RX ORDER — ONDANSETRON 2 MG/ML
4 INJECTION INTRAMUSCULAR; INTRAVENOUS EVERY 6 HOURS PRN
Status: DISCONTINUED | OUTPATIENT
Start: 2023-08-26 | End: 2023-08-28 | Stop reason: HOSPADM

## 2023-08-26 RX ORDER — CEPHALEXIN 500 MG/1
500 CAPSULE ORAL EVERY 8 HOURS SCHEDULED
Status: DISPENSED | OUTPATIENT
Start: 2023-08-26 | End: 2023-08-28

## 2023-08-26 RX ORDER — ALBUTEROL SULFATE 90 UG/1
2 AEROSOL, METERED RESPIRATORY (INHALATION) EVERY 6 HOURS PRN
Status: DISCONTINUED | OUTPATIENT
Start: 2023-08-26 | End: 2023-08-28 | Stop reason: HOSPADM

## 2023-08-26 RX ORDER — DOCUSATE SODIUM 100 MG/1
100 CAPSULE, LIQUID FILLED ORAL 2 TIMES DAILY
Status: DISCONTINUED | OUTPATIENT
Start: 2023-08-26 | End: 2023-08-28 | Stop reason: HOSPADM

## 2023-08-26 RX ORDER — IBUPROFEN 600 MG/1
600 TABLET ORAL EVERY 6 HOURS
Status: DISCONTINUED | OUTPATIENT
Start: 2023-08-27 | End: 2023-08-28 | Stop reason: HOSPADM

## 2023-08-26 RX ORDER — ENOXAPARIN SODIUM 100 MG/ML
40 INJECTION SUBCUTANEOUS DAILY
Status: DISCONTINUED | OUTPATIENT
Start: 2023-08-27 | End: 2023-08-28 | Stop reason: HOSPADM

## 2023-08-26 RX ORDER — LANOLIN 72 %
OINTMENT (GRAM) TOPICAL
Status: DISCONTINUED | OUTPATIENT
Start: 2023-08-26 | End: 2023-08-28 | Stop reason: HOSPADM

## 2023-08-26 RX ORDER — AZITHROMYCIN 500 MG/1
INJECTION, POWDER, LYOPHILIZED, FOR SOLUTION INTRAVENOUS PRN
Status: DISCONTINUED | OUTPATIENT
Start: 2023-08-26 | End: 2023-08-26 | Stop reason: SDUPTHER

## 2023-08-26 RX ORDER — FENTANYL CITRATE 50 UG/ML
INJECTION, SOLUTION INTRAMUSCULAR; INTRAVENOUS PRN
Status: DISCONTINUED | OUTPATIENT
Start: 2023-08-26 | End: 2023-08-26 | Stop reason: SDUPTHER

## 2023-08-26 RX ORDER — KETOROLAC TROMETHAMINE 30 MG/ML
30 INJECTION, SOLUTION INTRAMUSCULAR; INTRAVENOUS EVERY 6 HOURS
Status: COMPLETED | OUTPATIENT
Start: 2023-08-26 | End: 2023-08-27

## 2023-08-26 RX ORDER — VITAMIN A, ASCORBIC ACID, CHOLECALCIFEROL, .ALPHA.-TOCOPHEROL ACETATE, DL-, THIAMINE MONONITRATE, RIBOFLAVIN, NIACINAMIDE, PYRIDOXINE HYDROCHLORIDE, FOLIC ACID, CYANOCOBALAMIN, CALCIUM CARBONATE, IRON, ZINC OXIDE, AND CUPRIC OXIDE 4000; 120; 400; 22; 1.84; 3; 20; 10; 1; 12; 200; 29; 25; 2 [IU]/1; MG/1; [IU]/1; [IU]/1; MG/1; MG/1; MG/1; MG/1; MG/1; UG/1; MG/1; MG/1; MG/1; MG/1
1 TABLET ORAL DAILY
Status: DISCONTINUED | OUTPATIENT
Start: 2023-08-26 | End: 2023-08-28 | Stop reason: HOSPADM

## 2023-08-26 RX ORDER — METOCLOPRAMIDE HYDROCHLORIDE 5 MG/ML
10 INJECTION INTRAMUSCULAR; INTRAVENOUS ONCE
Status: COMPLETED | OUTPATIENT
Start: 2023-08-26 | End: 2023-08-26

## 2023-08-26 RX ORDER — ACETAMINOPHEN 500 MG
1000 TABLET ORAL EVERY 6 HOURS
Status: DISCONTINUED | OUTPATIENT
Start: 2023-08-26 | End: 2023-08-28 | Stop reason: HOSPADM

## 2023-08-26 RX ORDER — BISACODYL 10 MG
10 SUPPOSITORY, RECTAL RECTAL DAILY PRN
Status: DISCONTINUED | OUTPATIENT
Start: 2023-08-26 | End: 2023-08-28 | Stop reason: HOSPADM

## 2023-08-26 RX ORDER — ONDANSETRON 2 MG/ML
INJECTION INTRAMUSCULAR; INTRAVENOUS PRN
Status: DISCONTINUED | OUTPATIENT
Start: 2023-08-26 | End: 2023-08-26 | Stop reason: SDUPTHER

## 2023-08-26 RX ORDER — SODIUM CHLORIDE, SODIUM LACTATE, POTASSIUM CHLORIDE, CALCIUM CHLORIDE 600; 310; 30; 20 MG/100ML; MG/100ML; MG/100ML; MG/100ML
INJECTION, SOLUTION INTRAVENOUS CONTINUOUS
Status: DISCONTINUED | OUTPATIENT
Start: 2023-08-26 | End: 2023-08-27

## 2023-08-26 RX ORDER — METRONIDAZOLE 500 MG/1
500 TABLET ORAL EVERY 8 HOURS SCHEDULED
Status: DISPENSED | OUTPATIENT
Start: 2023-08-26 | End: 2023-08-28

## 2023-08-26 RX ORDER — SODIUM CHLORIDE 0.9 % (FLUSH) 0.9 %
5-40 SYRINGE (ML) INJECTION EVERY 12 HOURS SCHEDULED
Status: DISCONTINUED | OUTPATIENT
Start: 2023-08-26 | End: 2023-08-28 | Stop reason: HOSPADM

## 2023-08-26 RX ORDER — NALBUPHINE HYDROCHLORIDE 20 MG/ML
5 INJECTION, SOLUTION INTRAMUSCULAR; INTRAVENOUS; SUBCUTANEOUS
Status: DISPENSED | OUTPATIENT
Start: 2023-08-26 | End: 2023-08-27

## 2023-08-26 RX ORDER — OXYCODONE HYDROCHLORIDE 5 MG/1
10 TABLET ORAL EVERY 4 HOURS PRN
Status: DISCONTINUED | OUTPATIENT
Start: 2023-08-26 | End: 2023-08-28 | Stop reason: HOSPADM

## 2023-08-26 RX ORDER — OXYCODONE HYDROCHLORIDE 5 MG/1
5 TABLET ORAL EVERY 4 HOURS PRN
Status: DISCONTINUED | OUTPATIENT
Start: 2023-08-26 | End: 2023-08-28 | Stop reason: HOSPADM

## 2023-08-26 RX ORDER — POLYETHYLENE GLYCOL 3350 17 G/17G
17 POWDER, FOR SOLUTION ORAL DAILY
Status: DISCONTINUED | OUTPATIENT
Start: 2023-08-26 | End: 2023-08-28 | Stop reason: HOSPADM

## 2023-08-26 RX ORDER — MORPHINE SULFATE 1 MG/ML
INJECTION, SOLUTION EPIDURAL; INTRATHECAL; INTRAVENOUS PRN
Status: DISCONTINUED | OUTPATIENT
Start: 2023-08-26 | End: 2023-08-26 | Stop reason: SDUPTHER

## 2023-08-26 RX ORDER — TRANEXAMIC ACID 10 MG/ML
1000 INJECTION, SOLUTION INTRAVENOUS ONCE
Status: COMPLETED | OUTPATIENT
Start: 2023-08-26 | End: 2023-08-26

## 2023-08-26 RX ORDER — MIDAZOLAM HYDROCHLORIDE 1 MG/ML
INJECTION INTRAMUSCULAR; INTRAVENOUS PRN
Status: DISCONTINUED | OUTPATIENT
Start: 2023-08-26 | End: 2023-08-26 | Stop reason: SDUPTHER

## 2023-08-26 RX ORDER — CITRIC ACID/SODIUM CITRATE 334-500MG
30 SOLUTION, ORAL ORAL ONCE
Status: COMPLETED | OUTPATIENT
Start: 2023-08-26 | End: 2023-08-26

## 2023-08-26 RX ORDER — SENNA AND DOCUSATE SODIUM 50; 8.6 MG/1; MG/1
1 TABLET, FILM COATED ORAL DAILY
Qty: 30 TABLET | Refills: 1 | Status: SHIPPED | OUTPATIENT
Start: 2023-08-26

## 2023-08-26 RX ADMIN — DEXAMETHASONE SODIUM PHOSPHATE 10 MG: 10 INJECTION, SOLUTION INTRAMUSCULAR; INTRAVENOUS at 10:42

## 2023-08-26 RX ADMIN — CEPHALEXIN 500 MG: 500 CAPSULE ORAL at 21:49

## 2023-08-26 RX ADMIN — FAMOTIDINE 20 MG: 10 INJECTION, SOLUTION INTRAVENOUS at 06:57

## 2023-08-26 RX ADMIN — KETOROLAC TROMETHAMINE 30 MG: 30 INJECTION, SOLUTION INTRAMUSCULAR; INTRAVENOUS at 11:36

## 2023-08-26 RX ADMIN — FENTANYL CITRATE 100 MCG: 50 INJECTION, SOLUTION INTRAMUSCULAR; INTRAVENOUS at 11:49

## 2023-08-26 RX ADMIN — METRONIDAZOLE 500 MG: 500 TABLET, FILM COATED ORAL at 21:49

## 2023-08-26 RX ADMIN — LIDOCAINE HYDROCHLORIDE 5 ML: 20 INJECTION, SOLUTION EPIDURAL; INFILTRATION; INTRACAUDAL; PERINEURAL at 10:36

## 2023-08-26 RX ADMIN — TRANEXAMIC ACID 1000 MG: 10 INJECTION, SOLUTION INTRAVENOUS at 10:13

## 2023-08-26 RX ADMIN — CEPHALEXIN 500 MG: 500 CAPSULE ORAL at 15:32

## 2023-08-26 RX ADMIN — MIDAZOLAM 2 MG: 1 INJECTION INTRAMUSCULAR; INTRAVENOUS at 11:24

## 2023-08-26 RX ADMIN — SODIUM CHLORIDE, POTASSIUM CHLORIDE, SODIUM LACTATE AND CALCIUM CHLORIDE: 600; 310; 30; 20 INJECTION, SOLUTION INTRAVENOUS at 18:22

## 2023-08-26 RX ADMIN — SODIUM CITRATE AND CITRIC ACID MONOHYDRATE 30 ML: 500; 334 SOLUTION ORAL at 06:58

## 2023-08-26 RX ADMIN — SODIUM CHLORIDE, POTASSIUM CHLORIDE, SODIUM LACTATE AND CALCIUM CHLORIDE: 600; 310; 30; 20 INJECTION, SOLUTION INTRAVENOUS at 11:00

## 2023-08-26 RX ADMIN — ROPIVACAINE HYDROCHLORIDE 12 ML/HR: 2 INJECTION, SOLUTION EPIDURAL; INFILTRATION at 04:35

## 2023-08-26 RX ADMIN — LIDOCAINE HYDROCHLORIDE 5 ML: 20 INJECTION, SOLUTION EPIDURAL; INFILTRATION; INTRACAUDAL; PERINEURAL at 10:33

## 2023-08-26 RX ADMIN — MORPHINE SULFATE 3 MG: 4 INJECTION, SOLUTION INTRAMUSCULAR; INTRAVENOUS at 11:32

## 2023-08-26 RX ADMIN — MORPHINE SULFATE 2 MG: 4 INJECTION, SOLUTION INTRAMUSCULAR; INTRAVENOUS at 11:27

## 2023-08-26 RX ADMIN — KETOROLAC TROMETHAMINE 30 MG: 30 INJECTION, SOLUTION INTRAMUSCULAR; INTRAVENOUS at 18:21

## 2023-08-26 RX ADMIN — DOCUSATE SODIUM 100 MG: 100 CAPSULE, LIQUID FILLED ORAL at 21:49

## 2023-08-26 RX ADMIN — ACETAMINOPHEN 1000 MG: 500 TABLET ORAL at 15:17

## 2023-08-26 RX ADMIN — LIDOCAINE HYDROCHLORIDE 5 ML: 20 INJECTION, SOLUTION EPIDURAL; INFILTRATION; INTRACAUDAL; PERINEURAL at 10:43

## 2023-08-26 RX ADMIN — MORPHINE SULFATE 2 MG: 1 INJECTION, SOLUTION EPIDURAL; INTRATHECAL; INTRAVENOUS at 11:04

## 2023-08-26 RX ADMIN — MORPHINE SULFATE 3 MG: 4 INJECTION, SOLUTION INTRAMUSCULAR; INTRAVENOUS at 11:24

## 2023-08-26 RX ADMIN — LIDOCAINE HYDROCHLORIDE 5 ML: 20 INJECTION, SOLUTION EPIDURAL; INFILTRATION; INTRACAUDAL; PERINEURAL at 10:40

## 2023-08-26 RX ADMIN — METOCLOPRAMIDE 10 MG: 5 INJECTION, SOLUTION INTRAMUSCULAR; INTRAVENOUS at 03:43

## 2023-08-26 RX ADMIN — Medication 909 ML/HR: at 10:59

## 2023-08-26 RX ADMIN — AZITHROMYCIN MONOHYDRATE 500 MG: 500 INJECTION, POWDER, LYOPHILIZED, FOR SOLUTION INTRAVENOUS at 10:41

## 2023-08-26 RX ADMIN — ACETAMINOPHEN 1000 MG: 500 TABLET ORAL at 06:57

## 2023-08-26 RX ADMIN — SODIUM CHLORIDE, POTASSIUM CHLORIDE, SODIUM LACTATE AND CALCIUM CHLORIDE: 600; 310; 30; 20 INJECTION, SOLUTION INTRAVENOUS at 00:00

## 2023-08-26 RX ADMIN — ONDANSETRON 4 MG: 2 INJECTION INTRAMUSCULAR; INTRAVENOUS at 10:42

## 2023-08-26 RX ADMIN — SODIUM CHLORIDE, POTASSIUM CHLORIDE, SODIUM LACTATE AND CALCIUM CHLORIDE 1000 ML: 600; 310; 30; 20 INJECTION, SOLUTION INTRAVENOUS at 06:00

## 2023-08-26 RX ADMIN — METRONIDAZOLE 500 MG: 500 TABLET, FILM COATED ORAL at 15:32

## 2023-08-26 RX ADMIN — Medication 2000 MG: at 10:23

## 2023-08-26 RX ADMIN — ACETAMINOPHEN 1000 MG: 500 TABLET ORAL at 21:48

## 2023-08-26 RX ADMIN — SODIUM CHLORIDE, POTASSIUM CHLORIDE, SODIUM LACTATE AND CALCIUM CHLORIDE: 600; 310; 30; 20 INJECTION, SOLUTION INTRAVENOUS at 15:23

## 2023-08-26 ASSESSMENT — PAIN DESCRIPTION - LOCATION
LOCATION: ABDOMEN
LOCATION: ABDOMEN
LOCATION: INCISION

## 2023-08-26 ASSESSMENT — PAIN SCALES - GENERAL
PAINLEVEL_OUTOF10: 2
PAINLEVEL_OUTOF10: 1
PAINLEVEL_OUTOF10: 3

## 2023-08-26 ASSESSMENT — PAIN DESCRIPTION - DESCRIPTORS
DESCRIPTORS: CRAMPING
DESCRIPTORS: SORE

## 2023-08-26 NOTE — FLOWSHEET NOTE
Patient transferred to pp unit. Patient oriented to to room, patient stated understanding. Call light within reach will continue to monitor.

## 2023-08-26 NOTE — OP NOTE
[facility]  OBSTETRICAL  PHYSICIAN POST-OPERATIVE  NOTE:      Patient Name: Konstantin Duarte  Patient : 2000  [facility]  Admission Date/Time: 2023 11:34 AM  Primary Care Physician: Kailee Liriano  MRN #: [id number]  CSN #: [id number]        Date: 2023  Time: 11:33 AM        Pre-operative Diagnosis:   Konstantin Duarte is a [address] y.o. female at 38w0d      Term pregnancy, Induced labor, Single fetus, and Pregnancy complicated by: see problem list  Patient Active Problem List    Diagnosis Date Noted    14 weeks gestation of pregnancy 2023     Priority: Medium    HRP (high risk pregnancy), second trimester 02/15/2023     Priority: Medium    Hypothyroidism affecting pregnancy, antepartum 02/15/2023     Priority: Medium    37 weeks gestation of pregnancy 2023    Hypertension affecting pregnancy, antepartum 2023    Poor fetal growth affecting management of mother, antepartum 2023    Suspected Fetal VSD 07/10/2023     Overview Note:     7/10/23: suspected fetal VSD x 2 on MFM US   Fetal echo to be scheduled     Patient has MFM consultation scheduled         Irritable bowel syndrome with both constipation and diarrhea 2020    PCOS (polycystic ovarian syndrome) 2020    Amenorrhea 2020    Pelvic pain in female 2020       IUP@ 38w0d  See Problem List  3. Failed IOL  4. CHTN w/ FGR MFM recommended delivery  5. Pt request for primary       Post-operative Diagnosis:    Living  infant(s) and Female  Same as Pre-Op  Meconium  Nuchal cord x 1      Procedures:  1.  Section- primary : Low Cervical, Transverse    2. Abdominal Delivery of a Live Born     female          Surgeon:  Dash Collins,     Assistants:  Marilyn Barahona D.O. PGY4   2. BRANDY SOTO PGY2  3. AMNADA Koehler D.O. PGY1      OR Staff:  Sc[employer]: Gracelyn Lesches      Anesthesia:  epidural    Duramorph Utilized:

## 2023-08-26 NOTE — ANESTHESIA POSTPROCEDURE EVALUATION
Department of Anesthesiology  Postprocedure Note    Patient: Grey Franz  MRN: [id number]  YOB: 2000  Date of evaluation: 2023      Procedure Summary     Date: 23 Room / Location: [address]    Anesthesia Start: 3736 Anesthesia Stop: 23 1158    Procedures:        SECTION      Labor Analgesia Diagnosis:       Elective surgical procedure      (Other (Comment))    Surgeons: Marbella Armas DO Responsible Provider: Ta Dolan MD    Anesthesia Type: epidural ASA Status: 2          Anesthesia Type: No value filed.     Jay Phase I: Jay Score: 10    Jay Phase II:        Anesthesia Post Evaluation    Patient location during evaluation: PACU  Patient participation: complete - patient participated  Level of consciousness: awake  Pain score: 1  Airway patency: patent  Nausea & Vomiting: no nausea and no vomiting  Complications: no  Cardiovascular status: blood pressure returned to baseline and hemodynamically stable  Respiratory status: acceptable  Hydration status: euvolemic  Pain management: adequate

## 2023-08-26 NOTE — FLOWSHEET NOTE
Intermittent decels noted, repositioned pt on opposite side with abdomen resting on left side, repositioned to exaggerated doss position with right leg on stirrups.

## 2023-08-26 NOTE — FLOWSHEET NOTE
Tracing showing intermittent late decels, Pitocin currently at 4 ml/hr. Foleys catheter inserted and drained urine. Repositioned pt to right side with left leg on stirrup, IV bolus of 500ml LR given. Reviewed trace with Dr. Le.

## 2023-08-27 PROBLEM — Z87.59 HISTORY OF PRIOR PREGNANCY WITH IUGR NEWBORN: Status: ACTIVE | Noted: 2023-07-17

## 2023-08-27 PROBLEM — Z3A.14 14 WEEKS GESTATION OF PREGNANCY: Status: RESOLVED | Noted: 2023-03-16 | Resolved: 2023-08-27

## 2023-08-27 PROBLEM — F12.10 TETRAHYDROCANNABINOL (THC) USE DISORDER, MILD, ABUSE: Status: ACTIVE | Noted: 2023-08-27

## 2023-08-27 PROBLEM — I10 CHRONIC HYPERTENSION: Chronic | Status: ACTIVE | Noted: 2023-07-17

## 2023-08-27 PROBLEM — N91.2 AMENORRHEA: Status: RESOLVED | Noted: 2020-03-04 | Resolved: 2023-08-27

## 2023-08-27 PROBLEM — Z3A.37 37 WEEKS GESTATION OF PREGNANCY: Status: RESOLVED | Noted: 2023-08-24 | Resolved: 2023-08-27

## 2023-08-27 PROBLEM — I10 CHRONIC HYPERTENSION: Status: ACTIVE | Noted: 2023-07-17

## 2023-08-27 PROBLEM — O09.92 HRP (HIGH RISK PREGNANCY), SECOND TRIMESTER: Status: RESOLVED | Noted: 2023-02-15 | Resolved: 2023-08-27

## 2023-08-27 LAB
HCT VFR BLD AUTO: 32.8 % (ref 36.3–47.1)
HGB BLD-MCNC: 10.9 G/DL (ref 11.9–15.1)

## 2023-08-27 PROCEDURE — 85018 HEMOGLOBIN: CPT

## 2023-08-27 PROCEDURE — 6360000002 HC RX W HCPCS

## 2023-08-27 PROCEDURE — 85014 HEMATOCRIT: CPT

## 2023-08-27 PROCEDURE — 1220000000 HC SEMI PRIVATE OB R&B

## 2023-08-27 PROCEDURE — 0503F POSTPARTUM CARE VISIT: CPT | Performed by: OBSTETRICS & GYNECOLOGY

## 2023-08-27 PROCEDURE — 6370000000 HC RX 637 (ALT 250 FOR IP)

## 2023-08-27 PROCEDURE — 36415 COLL VENOUS BLD VENIPUNCTURE: CPT

## 2023-08-27 RX ADMIN — ENOXAPARIN SODIUM 40 MG: 100 INJECTION SUBCUTANEOUS at 08:31

## 2023-08-27 RX ADMIN — METRONIDAZOLE 500 MG: 500 TABLET, FILM COATED ORAL at 21:27

## 2023-08-27 RX ADMIN — OXYCODONE HYDROCHLORIDE 10 MG: 5 TABLET ORAL at 16:30

## 2023-08-27 RX ADMIN — CEPHALEXIN 500 MG: 500 CAPSULE ORAL at 13:18

## 2023-08-27 RX ADMIN — CEPHALEXIN 500 MG: 500 CAPSULE ORAL at 21:27

## 2023-08-27 RX ADMIN — METRONIDAZOLE 500 MG: 500 TABLET, FILM COATED ORAL at 13:18

## 2023-08-27 RX ADMIN — KETOROLAC TROMETHAMINE 30 MG: 30 INJECTION, SOLUTION INTRAMUSCULAR; INTRAVENOUS at 08:32

## 2023-08-27 RX ADMIN — MAGNESIUM HYDROXIDE 30 ML: 400 SUSPENSION ORAL at 21:30

## 2023-08-27 RX ADMIN — DOCUSATE SODIUM 100 MG: 100 CAPSULE, LIQUID FILLED ORAL at 08:31

## 2023-08-27 RX ADMIN — KETOROLAC TROMETHAMINE 30 MG: 30 INJECTION, SOLUTION INTRAMUSCULAR; INTRAVENOUS at 01:23

## 2023-08-27 RX ADMIN — IBUPROFEN 600 MG: 600 TABLET, FILM COATED ORAL at 21:27

## 2023-08-27 RX ADMIN — OXYCODONE HYDROCHLORIDE 10 MG: 5 TABLET ORAL at 21:27

## 2023-08-27 RX ADMIN — Medication 1 TABLET: at 08:31

## 2023-08-27 RX ADMIN — ACETAMINOPHEN 1000 MG: 500 TABLET ORAL at 04:25

## 2023-08-27 RX ADMIN — POLYETHYLENE GLYCOL 3350 17 G: 17 POWDER, FOR SOLUTION ORAL at 08:32

## 2023-08-27 RX ADMIN — ACETAMINOPHEN 1000 MG: 500 TABLET ORAL at 16:30

## 2023-08-27 RX ADMIN — OXYCODONE HYDROCHLORIDE 10 MG: 5 TABLET ORAL at 08:31

## 2023-08-27 RX ADMIN — DOCUSATE SODIUM 100 MG: 100 CAPSULE, LIQUID FILLED ORAL at 21:27

## 2023-08-27 ASSESSMENT — PAIN SCALES - GENERAL
PAINLEVEL_OUTOF10: 7
PAINLEVEL_OUTOF10: 3
PAINLEVEL_OUTOF10: 1
PAINLEVEL_OUTOF10: 7
PAINLEVEL_OUTOF10: 5

## 2023-08-27 ASSESSMENT — PAIN DESCRIPTION - LOCATION
LOCATION: ABDOMEN
LOCATION: INCISION
LOCATION: ABDOMEN

## 2023-08-27 ASSESSMENT — PAIN DESCRIPTION - DESCRIPTORS
DESCRIPTORS: CRAMPING
DESCRIPTORS: CRAMPING;SORE

## 2023-08-27 NOTE — CARE COORDINATION
CASE MANAGEMENT POST-PARTUM TRANSITIONAL CARE PLAN    37 weeks gestation of pregnancy [Z3A.37]    OB Provider: Dr. Dante Rios met w/ Iva Vargas and her fiancee at her bedside to discuss DCP. She is S/P CS on 2023 of female . Writer verified address/phone number correct on facesheet. She states she lives with her fiancee and her brother. Iva Vargas verbalized no difficulties with transportation to and from doctors appointments or with paying for medications upon discharge home. [employer] insurance correct. Writer notified Iva Vargas she has 30 days from date of birth to add  to insurance policy. Iva Vargas verbalized understanding. Iva Vargas confirmed a safe place for infant to sleep at home. Infant name on BC: Suhasrosita Sullivan. Infant PCP Dr Peggy Hernández.      DME: None  HOME CARE: None      Readmission Risk              Risk of Unplanned Readmission:  7

## 2023-08-28 VITALS
TEMPERATURE: 97.9 F | HEART RATE: 77 BPM | OXYGEN SATURATION: 99 % | SYSTOLIC BLOOD PRESSURE: 114 MMHG | DIASTOLIC BLOOD PRESSURE: 76 MMHG | RESPIRATION RATE: 16 BRPM

## 2023-08-28 PROCEDURE — 6370000000 HC RX 637 (ALT 250 FOR IP)

## 2023-08-28 PROCEDURE — 6360000002 HC RX W HCPCS

## 2023-08-28 RX ADMIN — IBUPROFEN 600 MG: 600 TABLET, FILM COATED ORAL at 05:35

## 2023-08-28 RX ADMIN — Medication 1 TABLET: at 09:00

## 2023-08-28 RX ADMIN — METRONIDAZOLE 500 MG: 500 TABLET, FILM COATED ORAL at 05:35

## 2023-08-28 RX ADMIN — IBUPROFEN 600 MG: 600 TABLET, FILM COATED ORAL at 11:38

## 2023-08-28 RX ADMIN — OXYCODONE HYDROCHLORIDE 10 MG: 5 TABLET ORAL at 10:16

## 2023-08-28 RX ADMIN — ENOXAPARIN SODIUM 40 MG: 100 INJECTION SUBCUTANEOUS at 09:00

## 2023-08-28 RX ADMIN — ACETAMINOPHEN 1000 MG: 500 TABLET ORAL at 01:06

## 2023-08-28 RX ADMIN — ACETAMINOPHEN 1000 MG: 500 TABLET ORAL at 09:00

## 2023-08-28 RX ADMIN — OXYCODONE HYDROCHLORIDE 10 MG: 5 TABLET ORAL at 05:34

## 2023-08-28 RX ADMIN — DOCUSATE SODIUM 100 MG: 100 CAPSULE, LIQUID FILLED ORAL at 09:00

## 2023-08-28 RX ADMIN — CEPHALEXIN 500 MG: 500 CAPSULE ORAL at 05:35

## 2023-08-28 ASSESSMENT — PAIN SCALES - GENERAL
PAINLEVEL_OUTOF10: 4
PAINLEVEL_OUTOF10: 7
PAINLEVEL_OUTOF10: 4

## 2023-08-28 NOTE — CARE COORDINATION
Social Work     Sw reviewed medical record (current active problem list) and tox screens and found mom is + THC at time of delivery. Sw spoke with mom and fob (Riki Martinez) briefly to explain Sw role, inquire if any needs or concerns, and provide safe sleep education and discuss. Mom denied any needs or questions and informs baby has a safe sleep environment (bassinet). Mom provided verbal consent for assessment to occur with fob present. Mom denied any current s/s of anxiety or depression and is aware to reach out to OB if any s/s occur after dc. Mom reports a really good support system with family who live in same apartment complex,and denied any current questions or needs. Mom reports this is her 1st baby. Mom states ped will be Dr. Billy Mayen. Mom reports that she and fob and her brother reside in home together. Due to [facility] informed Janice Vega). No other concerns, baby is cleared to dc home with mom. Sw encouraged mom to reach out if any issues or concerns arise.

## 2023-08-28 NOTE — PLAN OF CARE
Problem: Pain  Goal: Verbalizes/displays adequate comfort level or baseline comfort level  Outcome: Completed     Problem: Vaginal Birth or  Section  Goal: Fetal and maternal status remain reassuring during the birth process  Outcome: Completed     Problem: Safety - Adult  Goal: Free from fall injury  Outcome: Completed     Problem: Postpartum  Goal: Experiences normal postpartum course  Outcome: Completed  Goal: Appropriate maternal -  bonding  Outcome: Completed  Goal: Establishment of infant feeding pattern  Outcome: Completed  Goal: Incisions, wounds, or drain sites healing without S/S of infection  Outcome: Completed     Problem: Discharge Planning  Goal: Discharge to home or other facility with appropriate resources  Outcome: Completed

## 2023-08-28 NOTE — FLOWSHEET NOTE
Chlorihexidine Gluconate body wash given to patient with instructions to use at home. Patient verbalizes understanding. Discharge instructions given with understanding voiced. Home BP cuff with recording log given and explained with understanding voiced. MIRYAMHOSRUTHI [facility] [employer] [facility]: Post-Birth Warning Signs handout reviewed and given to mother. Understanding verbalized.

## 2023-08-30 LAB — SURGICAL PATHOLOGY REPORT: NORMAL

## 2023-09-07 ENCOUNTER — POSTPARTUM VISIT (OUTPATIENT)
Dept: OBGYN CLINIC | Age: 23
End: 2023-09-07

## 2023-09-07 VITALS
SYSTOLIC BLOOD PRESSURE: 104 MMHG | BODY MASS INDEX: 35.61 KG/M2 | HEIGHT: 68 IN | DIASTOLIC BLOOD PRESSURE: 72 MMHG | WEIGHT: 235 LBS | HEART RATE: 97 BPM

## 2023-09-07 DIAGNOSIS — Z48.89 POSTOPERATIVE VISIT: Primary | ICD-10-CM

## 2023-09-07 DIAGNOSIS — Z98.891 STATUS POST CESAREAN DELIVERY: ICD-10-CM

## 2023-09-07 ASSESSMENT — ENCOUNTER SYMPTOMS
COUGH: 0
ABDOMINAL PAIN: 0
DIARRHEA: 0
CONSTIPATION: 0
APNEA: 0
VOMITING: 0
NAUSEA: 0
ABDOMINAL DISTENTION: 0
EYE PAIN: 0

## 2023-09-07 NOTE — PROGRESS NOTES
Postpartum Visit: Patient is here today for postpartum  delivery. I have fully reviewed the prenatal and intrapartum course. She is 2 weeks postpartum. Patient is bottle feeding. Her bleeding is light. Patient's pain is 1 out of 10 on the pain scale. Patient is not sexually active. Current contraception method is abstinence. Postpartum depression screening questionnaire provided to patient and is negative.
the above documentation placed by my scribe Patricia Castillo. I reviewed the scribe's note and agree with the documented findings and plan of care. Any areas of disagreement are noted on the chart. I have personally evaluated this patient. Additional findings are as noted. I agree with the chief complaint, past medical history, past surgical history, allergies, medications, social and family history as documented unless otherwise noted below.      Electronically signed by Maximino Dawn MD on 9/8/2023 at 8:26 AM

## 2023-09-08 PROBLEM — Z98.891 STATUS POST CESAREAN DELIVERY: Status: ACTIVE | Noted: 2023-09-08

## 2023-09-08 PROBLEM — Z48.89 POSTOPERATIVE VISIT: Status: ACTIVE | Noted: 2023-09-08

## 2023-09-14 ENCOUNTER — POSTPARTUM VISIT (OUTPATIENT)
Dept: OBGYN CLINIC | Age: 23
End: 2023-09-14

## 2023-09-14 VITALS
HEIGHT: 68 IN | HEART RATE: 90 BPM | BODY MASS INDEX: 34.56 KG/M2 | SYSTOLIC BLOOD PRESSURE: 128 MMHG | WEIGHT: 228 LBS | DIASTOLIC BLOOD PRESSURE: 82 MMHG

## 2023-09-14 DIAGNOSIS — Z48.89 POSTOPERATIVE VISIT: Primary | ICD-10-CM

## 2023-09-14 DIAGNOSIS — Z98.891 STATUS POST CESAREAN DELIVERY: ICD-10-CM

## 2023-09-14 DIAGNOSIS — L70.8 ACNE-LIKE SKIN BUMPS: ICD-10-CM

## 2023-09-14 PROCEDURE — 99024 POSTOP FOLLOW-UP VISIT: CPT | Performed by: SPECIALIST

## 2023-09-14 ASSESSMENT — ENCOUNTER SYMPTOMS
VOMITING: 0
ABDOMINAL DISTENTION: 0
COUGH: 0
EYE PAIN: 0
CONSTIPATION: 0
DIARRHEA: 0
ABDOMINAL PAIN: 0
APNEA: 0
NAUSEA: 0

## 2023-09-14 NOTE — PROGRESS NOTES
Postpartum Visit: Patient is here today for postpartum  delivery. I have fully reviewed the prenatal and intrapartum course. She is 2 weeks postpartum. Patient is bottle feeding. Her bleeding is spotting. Patient's pain is 1 out of 10 on the pain scale. Patient is not sexually active. Current contraception method is abstinence. Postpartum depression screening questionnaire provided to patient and is negative.
Thought content normal.         Assessment:      Patient 2 weeks post  with acne-like bumps on bilateral inner thighs, otherwise doing well. Patient advised to keep incision clean and dry and to apply Neosporin to the wound. Will provide referral to dermatologist.      Plan:      Orders Placed This Encounter   Procedures    Tish Jimenez MD, Dermatology, [facility] in 4 weeks for postpartum exam    I, Jesse Thomas am scribing for, and in the presence of Dr. Declan Spencer. Electronically signed by: Jesse Thomas 23 2:29 PM       I agree to the above documentation placed by my scribe Jesse Thomas. I reviewed the scribe's note and agree with the documented findings and plan of care. Any areas of disagreement are noted on the chart. I have personally evaluated this patient. Additional findings are as noted. I agree with the chief complaint, past medical history, past surgical history, allergies, medications, social and family history as documented unless otherwise noted below.      Electronically signed by Declan Spencer MD on 9/15/2023 at 5:35 AM

## 2023-09-15 PROBLEM — L70.8 ACNE-LIKE SKIN BUMPS: Status: ACTIVE | Noted: 2023-09-15

## 2023-10-05 ENCOUNTER — POSTPARTUM VISIT (OUTPATIENT)
Dept: OBGYN CLINIC | Age: 23
End: 2023-10-05

## 2023-10-05 VITALS
SYSTOLIC BLOOD PRESSURE: 116 MMHG | HEART RATE: 78 BPM | BODY MASS INDEX: 33.34 KG/M2 | DIASTOLIC BLOOD PRESSURE: 72 MMHG | WEIGHT: 220 LBS | HEIGHT: 68 IN

## 2023-10-05 DIAGNOSIS — M79.672 LEFT FOOT PAIN: ICD-10-CM

## 2023-10-05 ASSESSMENT — ENCOUNTER SYMPTOMS
ABDOMINAL PAIN: 0
EYE PAIN: 0
NAUSEA: 0
ABDOMINAL DISTENTION: 0
APNEA: 0
DIARRHEA: 0
COUGH: 0
CONSTIPATION: 0
VOMITING: 0

## 2023-10-05 NOTE — PROGRESS NOTES
Postpartum Visit: Patient is here today for postpartum  delivery. I have fully reviewed the prenatal and intrapartum course. She is 6 weeks postpartum. Patient is bottle feeding. Her bleeding is light. Patient's pain is 0 out of 10 on the pain scale. Patient is not sexually active. Current contraception method is Ortho-Evra patches weekly. Postpartum depression screening questionnaire provided to patient and is negative.

## 2023-11-24 ENCOUNTER — TELEPHONE (OUTPATIENT)
Dept: OBGYN CLINIC | Age: 23
End: 2023-11-24

## 2023-11-24 NOTE — TELEPHONE ENCOUNTER
Patient called to say that her  incision area has some redness and pain. She said this started yesterday. She is scheduled for an appointment on 23. Patient was advised if the become more red or swollen or warm or if pain increases to go to the ED.

## 2023-11-27 ENCOUNTER — OFFICE VISIT (OUTPATIENT)
Dept: OBGYN CLINIC | Age: 23
End: 2023-11-27
Payer: COMMERCIAL

## 2023-11-27 VITALS
DIASTOLIC BLOOD PRESSURE: 70 MMHG | HEART RATE: 81 BPM | SYSTOLIC BLOOD PRESSURE: 114 MMHG | HEIGHT: 68 IN | WEIGHT: 214 LBS | BODY MASS INDEX: 32.43 KG/M2

## 2023-11-27 DIAGNOSIS — B37.2 SKIN YEAST INFECTION: Primary | ICD-10-CM

## 2023-11-27 PROCEDURE — 3074F SYST BP LT 130 MM HG: CPT | Performed by: CLINICAL NURSE SPECIALIST

## 2023-11-27 PROCEDURE — 1036F TOBACCO NON-USER: CPT | Performed by: CLINICAL NURSE SPECIALIST

## 2023-11-27 PROCEDURE — G8484 FLU IMMUNIZE NO ADMIN: HCPCS | Performed by: CLINICAL NURSE SPECIALIST

## 2023-11-27 PROCEDURE — G8417 CALC BMI ABV UP PARAM F/U: HCPCS | Performed by: CLINICAL NURSE SPECIALIST

## 2023-11-27 PROCEDURE — 99213 OFFICE O/P EST LOW 20 MIN: CPT | Performed by: CLINICAL NURSE SPECIALIST

## 2023-11-27 PROCEDURE — 3078F DIAST BP <80 MM HG: CPT | Performed by: CLINICAL NURSE SPECIALIST

## 2023-11-27 PROCEDURE — G8427 DOCREV CUR MEDS BY ELIG CLIN: HCPCS | Performed by: CLINICAL NURSE SPECIALIST

## 2023-11-27 RX ORDER — FLUCONAZOLE 100 MG/1
100 TABLET ORAL DAILY
Qty: 7 TABLET | Refills: 0 | Status: SHIPPED | OUTPATIENT
Start: 2023-11-27 | End: 2023-12-04

## 2023-11-27 RX ORDER — NORGESTIMATE AND ETHINYL ESTRADIOL 7DAYSX3 28
1 KIT ORAL DAILY
COMMUNITY
Start: 2023-11-21

## 2023-11-27 RX ORDER — NYSTATIN 100000 [USP'U]/G
POWDER TOPICAL
Qty: 60 G | Refills: 1 | Status: SHIPPED | OUTPATIENT
Start: 2023-11-27

## 2023-11-27 RX ORDER — GABAPENTIN 300 MG/1
300 CAPSULE ORAL 3 TIMES DAILY
COMMUNITY
Start: 2023-11-08

## 2023-11-27 ASSESSMENT — ENCOUNTER SYMPTOMS
GASTROINTESTINAL NEGATIVE: 1
ALLERGIC/IMMUNOLOGIC NEGATIVE: 1
RESPIRATORY NEGATIVE: 1
EYES NEGATIVE: 1

## 2024-01-10 ENCOUNTER — TELEPHONE (OUTPATIENT)
Dept: OBGYN CLINIC | Age: 24
End: 2024-01-10

## 2024-02-21 ENCOUNTER — HOSPITAL ENCOUNTER (OUTPATIENT)
Age: 24
Setting detail: SPECIMEN
Discharge: HOME | End: 2024-02-21

## 2024-02-21 ENCOUNTER — OFFICE VISIT (OUTPATIENT)
Dept: OBGYN CLINIC | Age: 24
End: 2024-02-21
Payer: COMMERCIAL

## 2024-02-21 VITALS
HEIGHT: 68 IN | WEIGHT: 218 LBS | DIASTOLIC BLOOD PRESSURE: 72 MMHG | SYSTOLIC BLOOD PRESSURE: 116 MMHG | HEART RATE: 99 BPM | BODY MASS INDEX: 33.04 KG/M2

## 2024-02-21 DIAGNOSIS — Z01.419 WELL WOMAN EXAM: Primary | ICD-10-CM

## 2024-02-21 PROCEDURE — 3074F SYST BP LT 130 MM HG: CPT | Performed by: CLINICAL NURSE SPECIALIST

## 2024-02-21 PROCEDURE — 3078F DIAST BP <80 MM HG: CPT | Performed by: CLINICAL NURSE SPECIALIST

## 2024-02-21 PROCEDURE — 99395 PREV VISIT EST AGE 18-39: CPT | Performed by: CLINICAL NURSE SPECIALIST

## 2024-02-21 PROCEDURE — G8484 FLU IMMUNIZE NO ADMIN: HCPCS | Performed by: CLINICAL NURSE SPECIALIST

## 2024-02-21 SDOH — ECONOMIC STABILITY: INCOME INSECURITY: HOW HARD IS IT FOR YOU TO PAY FOR THE VERY BASICS LIKE FOOD, HOUSING, MEDICAL CARE, AND HEATING?: NOT HARD AT ALL

## 2024-02-21 SDOH — ECONOMIC STABILITY: FOOD INSECURITY: WITHIN THE PAST 12 MONTHS, THE FOOD YOU BOUGHT JUST DIDN'T LAST AND YOU DIDN'T HAVE MONEY TO GET MORE.: NEVER TRUE

## 2024-02-21 SDOH — ECONOMIC STABILITY: FOOD INSECURITY: WITHIN THE PAST 12 MONTHS, YOU WORRIED THAT YOUR FOOD WOULD RUN OUT BEFORE YOU GOT MONEY TO BUY MORE.: NEVER TRUE

## 2024-02-21 ASSESSMENT — PATIENT HEALTH QUESTIONNAIRE - PHQ9
SUM OF ALL RESPONSES TO PHQ QUESTIONS 1-9: 0
SUM OF ALL RESPONSES TO PHQ9 QUESTIONS 1 & 2: 0
SUM OF ALL RESPONSES TO PHQ QUESTIONS 1-9: 0
2. FEELING DOWN, DEPRESSED OR HOPELESS: 0
SUM OF ALL RESPONSES TO PHQ QUESTIONS 1-9: 0
1. LITTLE INTEREST OR PLEASURE IN DOING THINGS: 0
SUM OF ALL RESPONSES TO PHQ QUESTIONS 1-9: 0

## 2024-02-21 NOTE — PROGRESS NOTES
[facility] OBSTETRICS & GYNECOLOGY  [address]  [place] [address]  Dept: [phone]        DATE OF VISIT:  24        History and Physical    Caitlin Vance    :  2000  CHIEF COMPLAINT:    Chief Complaint   Patient presents with    Annual Exam                    Caitlin Vance is a 23 y.o. female who presents for annual well woman exam.  Patient is on OCP's through her PCP and states that is working well for her.     The patient was seen and examined.  Per the patient bowels are regular. She has no voiding complaints.  She denies any bloating as well as vaginal discharge.    Chaperone for Intimate Exam  Chaperone was offered as part of the rooming process. Patient declined and agrees to continue with exam without a chaperone.  Chaperone: none     _____________________________________________________________________  Past Medical History:   Diagnosis Date    H/O bronchitis     Hypothyroid     hx of, no medications at this time    PCOS (polycystic ovarian syndrome)     PLTCS  23 F Apg  Wt 5#6  2023    Pre-diabetes     Psoriasis                                                                    Past Surgical History:   Procedure Laterality Date     SECTION N/A 2023     SECTION performed by Sarah Carr DO at [facility] L&D OR    TONSILLECTOMY AND ADENOIDECTOMY  2016    UPPER GASTROINTESTINAL ENDOSCOPY N/A 2020    EGD BIOPSY AND PHOTOS performed by Terry Mejía MD at [facility] ENDO    UPPER GASTROINTESTINAL ENDOSCOPY N/A 10/14/2022    EGD ESOPHAGOGASTRODUODENOSCOPY performed by Terry Mejía MD at [facility] ENDO     Family History   Problem Relation Age of Onset    Breast Cancer Paternal Grandmother     Seizures Maternal Grandmother     Diabetes Father     Sleep Apnea Father     Hypertension Father     Heart Disease Father     Hypothyroidism Mother     Cervical Cancer Paternal Aunt

## 2024-03-18 ENCOUNTER — TELEPHONE (OUTPATIENT)
Dept: OBGYN CLINIC | Age: 24
End: 2024-03-18

## 2024-03-18 LAB — CYTOLOGY REPORT: NORMAL

## 2024-03-18 NOTE — TELEPHONE ENCOUNTER
----- Message from HILDA Painting - CNP sent at 3/18/2024 10:16 AM EDT -----  Please let the patient know that she has ASCUS and neg HPV and per the asccp guidelines she is repeat pap in 1 yr

## 2024-03-19 ENCOUNTER — TELEPHONE (OUTPATIENT)
Dept: OBGYN CLINIC | Age: 24
End: 2024-03-19

## 2024-05-04 NOTE — TELEPHONE ENCOUNTER
Patient said a nausea medication was supposed to be sent to the pharmacy for her. She said that Zofran doesn't work for her. Please advise.
See mdm

## 2024-08-29 ENCOUNTER — TELEPHONE (OUTPATIENT)
Dept: OBGYN CLINIC | Age: 24
End: 2024-08-29

## 2025-02-12 ENCOUNTER — OFFICE VISIT (OUTPATIENT)
Dept: OBGYN CLINIC | Age: 25
End: 2025-02-12
Payer: COMMERCIAL

## 2025-02-12 VITALS
HEART RATE: 88 BPM | HEIGHT: 68 IN | DIASTOLIC BLOOD PRESSURE: 70 MMHG | SYSTOLIC BLOOD PRESSURE: 112 MMHG | BODY MASS INDEX: 32.89 KG/M2 | WEIGHT: 217 LBS

## 2025-02-12 DIAGNOSIS — R10.9 FLANK PAIN: ICD-10-CM

## 2025-02-12 DIAGNOSIS — N30.00 ACUTE CYSTITIS WITHOUT HEMATURIA: ICD-10-CM

## 2025-02-12 DIAGNOSIS — R30.0 BURNING WITH URINATION: ICD-10-CM

## 2025-02-12 DIAGNOSIS — R39.9 UTI SYMPTOMS: Primary | ICD-10-CM

## 2025-02-12 DIAGNOSIS — R39.15 URINARY URGENCY: ICD-10-CM

## 2025-02-12 LAB
BILIRUBIN, POC: NEGATIVE
BLOOD URINE, POC: NEGATIVE
CLARITY, POC: CLEAR
COLOR, POC: YELLOW
GLUCOSE URINE, POC: NEGATIVE MG/DL
KETONES, POC: NEGATIVE MG/DL
LEUKOCYTE EST, POC: POSITIVE
NITRITE, POC: POSITIVE
PH, POC: 8
PROTEIN, POC: NORMAL MG/DL
SPECIFIC GRAVITY, POC: 1
UROBILINOGEN, POC: NEGATIVE MG/DL

## 2025-02-12 PROCEDURE — 1036F TOBACCO NON-USER: CPT | Performed by: CLINICAL NURSE SPECIALIST

## 2025-02-12 PROCEDURE — G8417 CALC BMI ABV UP PARAM F/U: HCPCS | Performed by: CLINICAL NURSE SPECIALIST

## 2025-02-12 PROCEDURE — 99213 OFFICE O/P EST LOW 20 MIN: CPT | Performed by: CLINICAL NURSE SPECIALIST

## 2025-02-12 PROCEDURE — 3074F SYST BP LT 130 MM HG: CPT | Performed by: CLINICAL NURSE SPECIALIST

## 2025-02-12 PROCEDURE — 3078F DIAST BP <80 MM HG: CPT | Performed by: CLINICAL NURSE SPECIALIST

## 2025-02-12 PROCEDURE — 81003 URINALYSIS AUTO W/O SCOPE: CPT | Performed by: CLINICAL NURSE SPECIALIST

## 2025-02-12 PROCEDURE — G8427 DOCREV CUR MEDS BY ELIG CLIN: HCPCS | Performed by: CLINICAL NURSE SPECIALIST

## 2025-02-12 RX ORDER — NITROFURANTOIN 25; 75 MG/1; MG/1
100 CAPSULE ORAL 2 TIMES DAILY
Qty: 20 CAPSULE | Refills: 0 | Status: SHIPPED | OUTPATIENT
Start: 2025-02-12 | End: 2025-02-22

## 2025-02-12 SDOH — ECONOMIC STABILITY: FOOD INSECURITY: WITHIN THE PAST 12 MONTHS, YOU WORRIED THAT YOUR FOOD WOULD RUN OUT BEFORE YOU GOT MONEY TO BUY MORE.: NEVER TRUE

## 2025-02-12 SDOH — ECONOMIC STABILITY: FOOD INSECURITY: WITHIN THE PAST 12 MONTHS, THE FOOD YOU BOUGHT JUST DIDN'T LAST AND YOU DIDN'T HAVE MONEY TO GET MORE.: NEVER TRUE

## 2025-02-12 ASSESSMENT — ENCOUNTER SYMPTOMS
GASTROINTESTINAL NEGATIVE: 1
EYES NEGATIVE: 1
ALLERGIC/IMMUNOLOGIC NEGATIVE: 1
RESPIRATORY NEGATIVE: 1

## 2025-02-12 ASSESSMENT — PATIENT HEALTH QUESTIONNAIRE - PHQ9
SUM OF ALL RESPONSES TO PHQ9 QUESTIONS 1 & 2: 0
1. LITTLE INTEREST OR PLEASURE IN DOING THINGS: NOT AT ALL
SUM OF ALL RESPONSES TO PHQ QUESTIONS 1-9: 0
2. FEELING DOWN, DEPRESSED OR HOPELESS: NOT AT ALL
SUM OF ALL RESPONSES TO PHQ QUESTIONS 1-9: 0

## 2025-02-12 NOTE — PROGRESS NOTES
Subjective:      Patient ID:  Caitlin Vance is a 24 y.o. female who presents for   Chief Complaint   Patient presents with    Urinary Tract Infection       HPI    Patient is a 23 yo female who presents for burning with urination, flank pain and urgency which has been ongoing for the past 7 days.     Review of Systems   Constitutional:  Negative for chills and fever.   HENT: Negative.     Eyes: Negative.    Respiratory: Negative.     Cardiovascular: Negative.    Gastrointestinal: Negative.    Endocrine: Negative.    Genitourinary:  Positive for dysuria, flank pain, frequency and urgency.   Skin: Negative.    Allergic/Immunologic: Negative.    Neurological: Negative.    Hematological: Negative.    Psychiatric/Behavioral: Negative.       /70   Pulse 88   Ht 1.727 m (5' 8\")   Wt 98.4 kg (217 lb)   LMP 2025   BMI 32.99 kg/m²    Patient's last menstrual period was 2025.    Family History   Problem Relation Age of Onset    Breast Cancer Paternal Grandmother     Seizures Maternal Grandmother     Diabetes Father     Sleep Apnea Father     Hypertension Father     Heart Disease Father     Hypothyroidism Mother     Cervical Cancer Paternal Aunt     Uterine Cancer Paternal Aunt       Past Medical History:   Diagnosis Date    H/O bronchitis     Hypothyroid     hx of, no medications at this time    PCOS (polycystic ovarian syndrome)     PLTCS  23 F Apg  Wt 5#6  2023    Pre-diabetes     Psoriasis       Past Surgical History:   Procedure Laterality Date     SECTION N/A 2023     SECTION performed by Sarah Carr DO at [facility] L&D OR    TONSILLECTOMY AND ADENOIDECTOMY  2016    UPPER GASTROINTESTINAL ENDOSCOPY N/A 2020    EGD BIOPSY AND PHOTOS performed by Terry Mejía MD at [facility]    UPPER GASTROINTESTINAL ENDOSCOPY N/A 10/14/2022    EGD ESOPHAGOGASTRODUODENOSCOPY performed by Terry Mejía MD at [facility]      Social History

## 2025-02-27 ENCOUNTER — OFFICE VISIT (OUTPATIENT)
Dept: OBGYN CLINIC | Age: 25
End: 2025-02-27
Payer: COMMERCIAL

## 2025-02-27 ENCOUNTER — HOSPITAL ENCOUNTER (OUTPATIENT)
Age: 25
Setting detail: SPECIMEN
Discharge: HOME | End: 2025-02-27

## 2025-02-27 VITALS
HEART RATE: 70 BPM | WEIGHT: 218 LBS | BODY MASS INDEX: 33.04 KG/M2 | DIASTOLIC BLOOD PRESSURE: 80 MMHG | SYSTOLIC BLOOD PRESSURE: 124 MMHG | HEIGHT: 68 IN

## 2025-02-27 DIAGNOSIS — Z01.419 WELL WOMAN EXAM: Primary | ICD-10-CM

## 2025-02-27 PROCEDURE — 3074F SYST BP LT 130 MM HG: CPT | Performed by: CLINICAL NURSE SPECIALIST

## 2025-02-27 PROCEDURE — 3079F DIAST BP 80-89 MM HG: CPT | Performed by: CLINICAL NURSE SPECIALIST

## 2025-02-27 PROCEDURE — 99395 PREV VISIT EST AGE 18-39: CPT | Performed by: CLINICAL NURSE SPECIALIST

## 2025-02-27 NOTE — PROGRESS NOTES
[facility] OBSTETRICS & GYNECOLOGY  [address] [place] [id number]  Dept: [phone]        DATE OF VISIT:  25        History and Physical    Caitlin Vance    :  2000  CHIEF COMPLAINT:    Chief Complaint   Patient presents with    Annual Exam                    Caitlin Vance is a 24 y.o. female who presents for annual well woman exam.      The patient was seen and examined.  Per the patient bowels are regular. She has no voiding complaints.  She denies any bloating as well as vaginal discharge.    Chaperone for Intimate Exam  Chaperone was offered as part of the rooming process. Patient declined and agrees to continue with exam without a chaperone.  Chaperone: none     _____________________________________________________________________  Past Medical History:   Diagnosis Date    H/O bronchitis     Hypothyroid     hx of, no medications at this time    PCOS (polycystic ovarian syndrome)     PLTCS  23 F Apg  Wt 5#6  2023    Pre-diabetes     Psoriasis                                                                    Past Surgical History:   Procedure Laterality Date     SECTION N/A 2023     SECTION performed by Sarah Carr DO at [facility] L&D OR    TONSILLECTOMY AND ADENOIDECTOMY  2016    UPPER GASTROINTESTINAL ENDOSCOPY N/A 2020    EGD BIOPSY AND PHOTOS performed by Terry Mejía MD at [facility] ENDO    UPPER GASTROINTESTINAL ENDOSCOPY N/A 10/14/2022    EGD ESOPHAGOGASTRODUODENOSCOPY performed by Terry Mejía MD at [facility] ENDO     Family History   Problem Relation Age of Onset    Breast Cancer Paternal Grandmother     Seizures Maternal Grandmother     Diabetes Father     Sleep Apnea Father     Hypertension Father     Heart Disease Father     Hypothyroidism Mother     Cervical Cancer Paternal Aunt     Uterine Cancer Paternal Aunt      Social History     Tobacco Use   Smoking

## 2025-03-11 ENCOUNTER — RESULTS FOLLOW-UP (OUTPATIENT)
Dept: LAB | Age: 25
End: 2025-03-11

## 2025-03-11 LAB — CYTOLOGY REPORT: NORMAL

## 2025-05-27 NOTE — PROGRESS NOTES
Grey Franz is a 21 y.o. female 36w2d        OB History    Para Term  AB Living   1 0 0 0 0 0   SAB IAB Ectopic Molar Multiple Live Births   0 0 0 0 0 0      # Outcome Date GA Lbr Zen/2nd Weight Sex Delivery Anes PTL Lv   1 Current                Chief Complaint   Patient presents with    High Risk Pregnancy     IUGR    Hypertension    Hypothyroidism    Non-stress Test        Blood pressure 110/72, pulse (!) 104, weight 252 lb 3.2 oz (114.4 kg), last menstrual period 2022, not currently breastfeeding. The patient was seen and evaluated. There was positive fetal movements. No contractions or leakage of fluid. Signs and symptoms of labor were reviewed. The S/S of Pre-Eclampsia were reviewed with the patient in detail. She is to report any of these if they occur. She currently denies any of these. The patient was instructed on fetal kick counts and was given a kick sheet to complete every 8 hours. She was instructed that the baby should move at a minimum of ten times within one hour after a meal. The patient was instructed to lay down on her left side twenty minutes after eating and count movements for up to one hour with a target value of ten movements. She was instructed to notify the office if she did not make that target after two attempts or if after any attempt there was less than four movements. The patient reports that the targets have been made Yes. Allergies: Allergies as of 2023 - Fully Reviewed 2023   Allergen Reaction Noted    Zofran [ondansetron]  2022       Group Beta Strep collection was completed. Yes - done today    GC and Chlamydia were previously preformed and reviewed. The results are negative. She has been instructed to call the office at anytime prior to going into the hospital so the on-call physician may direct her to the appropriate facility for care.  Exceptions were reviewed including but not limited to: Decreased fetal Admission

## 2025-05-28 ENCOUNTER — HOSPITAL ENCOUNTER (OUTPATIENT)
Age: 25
Setting detail: SPECIMEN
Discharge: HOME | End: 2025-05-28

## 2025-05-28 ENCOUNTER — OFFICE VISIT (OUTPATIENT)
Dept: OBGYN CLINIC | Age: 25
End: 2025-05-28
Payer: COMMERCIAL

## 2025-05-28 VITALS
DIASTOLIC BLOOD PRESSURE: 76 MMHG | HEART RATE: 90 BPM | HEIGHT: 68 IN | BODY MASS INDEX: 31.07 KG/M2 | WEIGHT: 205 LBS | SYSTOLIC BLOOD PRESSURE: 112 MMHG

## 2025-05-28 DIAGNOSIS — R30.0 BURNING WITH URINATION: ICD-10-CM

## 2025-05-28 DIAGNOSIS — N30.01 ACUTE CYSTITIS WITH HEMATURIA: Primary | ICD-10-CM

## 2025-05-28 DIAGNOSIS — N30.01 ACUTE CYSTITIS WITH HEMATURIA: ICD-10-CM

## 2025-05-28 DIAGNOSIS — N32.89 BLADDER SPASMS: ICD-10-CM

## 2025-05-28 LAB
BILIRUBIN, POC: NORMAL
BLOOD URINE, POC: NORMAL
CLARITY, POC: CLEAR
COLOR, POC: YELLOW
GLUCOSE URINE, POC: NORMAL MG/DL
KETONES, POC: NORMAL MG/DL
LEUKOCYTE EST, POC: NORMAL
NITRITE, POC: NORMAL
PH, POC: 5
PROTEIN, POC: NORMAL MG/DL
SPECIFIC GRAVITY, POC: 1.01
UROBILINOGEN, POC: NORMAL MG/DL

## 2025-05-28 PROCEDURE — 99213 OFFICE O/P EST LOW 20 MIN: CPT | Performed by: CLINICAL NURSE SPECIALIST

## 2025-05-28 PROCEDURE — 1036F TOBACCO NON-USER: CPT | Performed by: CLINICAL NURSE SPECIALIST

## 2025-05-28 PROCEDURE — 3074F SYST BP LT 130 MM HG: CPT | Performed by: CLINICAL NURSE SPECIALIST

## 2025-05-28 PROCEDURE — G8417 CALC BMI ABV UP PARAM F/U: HCPCS | Performed by: CLINICAL NURSE SPECIALIST

## 2025-05-28 PROCEDURE — 3078F DIAST BP <80 MM HG: CPT | Performed by: CLINICAL NURSE SPECIALIST

## 2025-05-28 PROCEDURE — G8427 DOCREV CUR MEDS BY ELIG CLIN: HCPCS | Performed by: CLINICAL NURSE SPECIALIST

## 2025-05-28 PROCEDURE — 81003 URINALYSIS AUTO W/O SCOPE: CPT | Performed by: CLINICAL NURSE SPECIALIST

## 2025-05-28 RX ORDER — PHENAZOPYRIDINE HYDROCHLORIDE 200 MG/1
200 TABLET, FILM COATED ORAL 3 TIMES DAILY PRN
Qty: 10 TABLET | Refills: 0 | Status: SHIPPED | OUTPATIENT
Start: 2025-05-28 | End: 2025-05-31

## 2025-05-28 RX ORDER — NITROFURANTOIN 25; 75 MG/1; MG/1
100 CAPSULE ORAL 2 TIMES DAILY
Qty: 20 CAPSULE | Refills: 0 | Status: SHIPPED | OUTPATIENT
Start: 2025-05-28 | End: 2025-06-07

## 2025-05-28 NOTE — PROGRESS NOTES
Subjective:      Patient ID:  Caitlin Vance is a 24 y.o. female who presents for   Chief Complaint   Patient presents with    Urinary Tract Infection       HPI    Patient is a 23 yo female who presents for urinary burning and urgency.  Patient reports some bladder spasms.    Review of Systems   Constitutional:  Negative for chills and fever.   HENT: Negative.     Eyes: Negative.    Respiratory: Negative.     Cardiovascular: Negative.    Gastrointestinal: Negative.    Endocrine: Negative.    Genitourinary:  Positive for dysuria (over the past 2-3 days) and frequency. Negative for menstrual problem and vaginal discharge.   Musculoskeletal: Negative.    Skin: Negative.    Allergic/Immunologic: Negative.    Neurological: Negative.    Hematological: Negative.    Psychiatric/Behavioral: Negative.       /76   Pulse 90   Ht 1.727 m (5' 8\")   Wt 93 kg (205 lb)   LMP 2025   BMI 31.17 kg/m²    Patient's last menstrual period was 2025.    Family History   Problem Relation Age of Onset    Breast Cancer Paternal Grandmother     Seizures Maternal Grandmother     Diabetes Father     Sleep Apnea Father     Hypertension Father     Heart Disease Father     Hypothyroidism Mother     Cervical Cancer Paternal Aunt     Uterine Cancer Paternal Aunt       Past Medical History:   Diagnosis Date    H/O bronchitis     Hypothyroid     hx of, no medications at this time    PCOS (polycystic ovarian syndrome)     PLTCS  23 F Apg  Wt 5#6  2023    Pre-diabetes     Psoriasis       Past Surgical History:   Procedure Laterality Date     SECTION N/A 2023     SECTION performed by Sarah Carr DO at [facility] L&D OR    TONSILLECTOMY AND ADENOIDECTOMY  2016    UPPER GASTROINTESTINAL ENDOSCOPY N/A 2020    EGD BIOPSY AND PHOTOS performed by Terry Mejía MD at [facility]    UPPER GASTROINTESTINAL ENDOSCOPY N/A 10/14/2022    EGD ESOPHAGOGASTRODUODENOSCOPY performed by Terry ALVAREZ

## 2025-05-30 ENCOUNTER — RESULTS FOLLOW-UP (OUTPATIENT)
Dept: OBGYN CLINIC | Age: 25
End: 2025-05-30

## 2025-05-30 LAB
MICROORGANISM SPEC CULT: ABNORMAL
SERVICE CMNT-IMP: ABNORMAL
SPECIMEN DESCRIPTION: ABNORMAL

## 2025-08-25 ENCOUNTER — TELEPHONE (OUTPATIENT)
Dept: OBGYN CLINIC | Age: 25
End: 2025-08-25

## 2025-08-25 RX ORDER — FLUCONAZOLE 100 MG/1
100 TABLET ORAL DAILY
Qty: 7 TABLET | Refills: 0 | Status: SHIPPED | OUTPATIENT
Start: 2025-08-25 | End: 2025-09-01

## 2025-08-25 RX ORDER — METRONIDAZOLE 500 MG/1
500 TABLET ORAL 2 TIMES DAILY
Qty: 14 TABLET | Refills: 0 | Status: SHIPPED | OUTPATIENT
Start: 2025-08-25 | End: 2025-09-01

## (undated) DEVICE — GLOVE SURG SZ 7 THK118MIL BLK LTX FREE POLYISOPRENE BEAD CUF

## (undated) DEVICE — TOWEL SURG W16XL26IN WHT NONFENESTRATED ST 2 PER PK

## (undated) DEVICE — DEFENDO AIR WATER SUCTION AND BIOPSY VALVE KIT FOR  OLYMPUS: Brand: DEFENDO AIR/WATER/SUCTION AND BIOPSY VALVE

## (undated) DEVICE — GLOVE ORANGE PI 7   MSG9070

## (undated) DEVICE — ENDO KIT W/SYRINGE: Brand: MEDLINE INDUSTRIES, INC.

## (undated) DEVICE — FORCEPS BX L240CM WRK CHN 2.8MM STD CAP W/ NDL MIC MESH

## (undated) DEVICE — KENDALL SCD EXPRESS SLEEVES, KNEE LENGTH, MEDIUM: Brand: KENDALL SCD

## (undated) DEVICE — SUTURE VCRL SZ 2-0 L36IN ABSRB VLT L36MM CT-1 1/2 CIR J345H

## (undated) DEVICE — TRAY SPNL 24GA L4IN PENCAN PNCL PNT NDL 0.75% BIPIVCAIN W/

## (undated) DEVICE — BITEBLOCK 54FR W/ DENT RIM BLOX